# Patient Record
Sex: MALE | Race: WHITE | NOT HISPANIC OR LATINO | Employment: FULL TIME | URBAN - METROPOLITAN AREA
[De-identification: names, ages, dates, MRNs, and addresses within clinical notes are randomized per-mention and may not be internally consistent; named-entity substitution may affect disease eponyms.]

---

## 2017-05-28 ENCOUNTER — TRANSFERRED RECORDS (OUTPATIENT)
Dept: HEALTH INFORMATION MANAGEMENT | Facility: CLINIC | Age: 42
End: 2017-05-28

## 2017-06-02 ENCOUNTER — ALLIED HEALTH/NURSE VISIT (OUTPATIENT)
Dept: UROLOGY | Facility: CLINIC | Age: 42
End: 2017-06-02

## 2017-06-02 ENCOUNTER — OFFICE VISIT (OUTPATIENT)
Dept: UROLOGY | Facility: CLINIC | Age: 42
End: 2017-06-02

## 2017-06-02 VITALS
HEART RATE: 95 BPM | SYSTOLIC BLOOD PRESSURE: 141 MMHG | BODY MASS INDEX: 35.36 KG/M2 | HEIGHT: 66 IN | WEIGHT: 220 LBS | DIASTOLIC BLOOD PRESSURE: 99 MMHG

## 2017-06-02 DIAGNOSIS — N20.0 CALCULUS OF KIDNEY: Primary | ICD-10-CM

## 2017-06-02 LAB
ALBUMIN UR-MCNC: NEGATIVE MG/DL
ANION GAP SERPL CALCULATED.3IONS-SCNC: 10 MMOL/L (ref 3–14)
APPEARANCE UR: CLEAR
BILIRUB UR QL STRIP: NEGATIVE
BUN SERPL-MCNC: 14 MG/DL (ref 7–30)
CALCIUM SERPL-MCNC: 8.9 MG/DL (ref 8.5–10.1)
CHLORIDE SERPL-SCNC: 105 MMOL/L (ref 94–109)
CO2 SERPL-SCNC: 24 MMOL/L (ref 20–32)
COLOR UR AUTO: ABNORMAL
CREAT SERPL-MCNC: 0.77 MG/DL (ref 0.66–1.25)
GFR SERPL CREATININE-BSD FRML MDRD: ABNORMAL ML/MIN/1.7M2
GLUCOSE SERPL-MCNC: 309 MG/DL (ref 70–99)
GLUCOSE UR STRIP-MCNC: >499 MG/DL
HGB UR QL STRIP: ABNORMAL
KETONES UR STRIP-MCNC: NEGATIVE MG/DL
LEUKOCYTE ESTERASE UR QL STRIP: NEGATIVE
MUCOUS THREADS #/AREA URNS LPF: PRESENT /LPF
NITRATE UR QL: NEGATIVE
PH UR STRIP: 5 PH (ref 5–7)
POTASSIUM SERPL-SCNC: 4 MMOL/L (ref 3.4–5.3)
RBC #/AREA URNS AUTO: 0 /HPF (ref 0–2)
SODIUM SERPL-SCNC: 138 MMOL/L (ref 133–144)
SP GR UR STRIP: 1.01 (ref 1–1.03)
URN SPEC COLLECT METH UR: ABNORMAL
UROBILINOGEN UR STRIP-MCNC: 0 MG/DL (ref 0–2)
WBC #/AREA URNS AUTO: 1 /HPF (ref 0–2)

## 2017-06-02 RX ORDER — TAMSULOSIN HYDROCHLORIDE 0.4 MG/1
CAPSULE ORAL
COMMUNITY
Start: 2017-05-28 | End: 2017-06-02

## 2017-06-02 RX ORDER — CEFAZOLIN SODIUM 1 G/3ML
1 INJECTION, POWDER, FOR SOLUTION INTRAMUSCULAR; INTRAVENOUS SEE ADMIN INSTRUCTIONS
Status: CANCELLED | OUTPATIENT
Start: 2017-06-02

## 2017-06-02 RX ORDER — TAMSULOSIN HYDROCHLORIDE 0.4 MG/1
0.4 CAPSULE ORAL DAILY
Qty: 30 CAPSULE | Refills: 0 | Status: SHIPPED | OUTPATIENT
Start: 2017-06-02 | End: 2017-06-22

## 2017-06-02 RX ORDER — TRAMADOL HYDROCHLORIDE 50 MG/1
TABLET ORAL
COMMUNITY
Start: 2017-05-28 | End: 2017-06-20

## 2017-06-02 ASSESSMENT — ENCOUNTER SYMPTOMS
WEIGHT GAIN: 0
ALTERED TEMPERATURE REGULATION: 0
WEIGHT LOSS: 1
POLYPHAGIA: 0
FEVER: 0
POLYDIPSIA: 0
NIGHT SWEATS: 0
DECREASED APPETITE: 0
HALLUCINATIONS: 0
INCREASED ENERGY: 0
FATIGUE: 1
CHILLS: 0

## 2017-06-02 ASSESSMENT — PAIN SCALES - GENERAL: PAINLEVEL: MILD PAIN (2)

## 2017-06-02 NOTE — LETTER
6/2/2017       RE: Jose Luis Rae  [id number] [facility] [address]     Dear Colleague,    Thank you for referring your patient, Jose Luis Rae, to the [facility] UROLOGY AND INST FOR PROSTATE AND UROLOGIC CANCERS at [facility]. Please see a copy of my visit note below.    ASSESSMENT AND PLAN  He was seen in the ER over the weekend for evaluation of a 6 mm UPJ stone with associated mild to moderate hydronephrosis. At that time he was also noted to have a blood sugar in the 400s. We covered surgical risks which include but are not limited to heart attack, stroke, blood clot in the legs or lungs, death, injury to surrounding organs (intestine, liver, spleen, pancreas, lung, muscles, nerves), hernias, loss of sensation around incisions, decreased renal function, and infection.  Additional procedures may be necessary in the perioperative period.  He choose EXTRACORPOREAL SHOCKWAVE LITHOTRIPSY (ESWL) after we discussed ureteroscopy with laser lithotripsy and observation.  - Schedule shock wave lithotripsy  - KUB today  - Followup with primary care provider before surgery regarding elevated blood sugar    _______________________________________________________________________    CHIEF COMPLAINT  It was my pleasure to see Jose Luis Rae who is a 42 year old male for evaluation of renal stone    HPI   Jose Luis Rae is a 41 yo man who was seen in the ER over the weekend for evaluation of severe left flank pain and workup revealed a 6 mm UPJ stone with associated mild to moderate hydronephrosis. His creatinine at the time was 1.25 and UA was negative. Of note, he was also found to have a blood sugar in the 400s. He has never previously been diagnosed with diabetes. He's still having left sided flank pain and has not passed the stone. He's had no dysuria or hematuria. This is his first kidney stone. No family hx of kidney stones.     Radiologic Imaging      Patient Active  Problem List    Diagnosis Date Noted     Type 2 diabetes mellitus with hyperglycemia, without long-term current use of insulin (H) 06/14/2017     Priority: Medium     Abnormal liver function tests 10/12/2012     Priority: Medium     Hypertriglyceridemia 10/12/2012     Priority: Medium     Overweight 10/12/2012     Priority: Medium     Past Medical History:   Diagnosis Date     Abnormal liver function tests 10/12/2012     Calculus of kidney      Diabetes (H)      Hypertriglyceridemia 10/12/2012     Overweight 10/12/2012     Past Surgical History:   Procedure Laterality Date     ADENOIDECTOMY       Current Outpatient Prescriptions   Medication Sig Dispense Refill     traMADol (ULTRAM) 50 MG tablet        ketorolac (TORADOL) 10 MG tablet Take 10 mg by mouth every 6 hours as needed for moderate pain       Ibuprofen (ADVIL PO) Take 600 mg by mouth       blood glucose monitoring (NO BRAND SPECIFIED) test strip Needs One Touch Verio test strip. Use to test blood sugars 1-2 times daily or as directed 50 strip 3     metFORMIN (GLUCOPHAGE-XR) 500 MG 24 hr tablet Take 1 tablet (500 mg) by mouth 2 times daily (with meals) 60 tablet 3     tamsulosin (FLOMAX) 0.4 MG capsule Take 1 capsule (0.4 mg) by mouth daily 30 capsule 0      No Known Allergies  Family History   Problem Relation Age of Onset     DIABETES Maternal Grandfather      Coronary Artery Disease Maternal Grandfather      Hypertension Maternal Grandfather      Hyperlipidemia Maternal Grandfather      Lung Cancer Maternal Grandfather      Thyroid Disease Mother      CEREBROVASCULAR DISEASE No family hx of      Breast Cancer No family hx of      Colon Cancer No family hx of      Prostate Cancer No family hx of      Other Cancer No family hx of      Depression No family hx of      Anxiety Disorder No family hx of      MENTAL ILLNESS No family hx of      Substance Abuse No family hx of      Anesthesia Reaction No family hx of      Asthma No family hx of      OSTEOPOROSIS  "No family hx of      Genetic Disorder No family hx of      Obesity No family hx of      Unknown/Adopted No family hx of       Social History     Occupational History     Not on file.     Social History Main Topics     Smoking status: Never Smoker     Smokeless tobacco: Never Used     Alcohol use Yes      Comment: maybe once a week      Drug use: No     Sexual activity: Not Currently       REVIEW OF SYSTEMS  The following systems were evaluated:   Constitutional, Eyes, Ears/Nose/Throat, Respiratory, Cardiovascular, Gastrointestinal, Genitourinary, Musculoskeletal, Skin/Integument, Neurologic, Psychiatric, Hematologic/Lymphatic, Allergic/Immunologic, Endocrine.  The only pertinent positives were as follows:  None    PHYSICAL EXAM  Vitals:    06/02/17 1414   BP: (!) 141/99   Pulse: 95   Weight: 99.8 kg (220 lb)   Height: 1.676 m (5' 6\")     Wt Readings from Last 3 Encounters:   06/20/17 99.3 kg (219 lb)   06/13/17 99.7 kg (219 lb 14.4 oz)   06/02/17 99.8 kg (220 lb)     Constitutional: Alert, no acute distress  Psychiatric: Normal mood and affect  Head: Normocephalic. No masses, lesions, tenderness or abnormalities  Neck: Neck supple. No adenopathy. Thyroid symmetric, normal size  ENT: Oropharynx clear.  Back: No spinal tenderness.  Left costovertebral angle tenderness  Cardiovascular: RRR. No murmurs, clicks gallops or rub  Respiratory: Good diaphragmatic excursion. Lungs clear  Gastrointestinal: Abdomen soft, non-tender. No masses, organomegaly. No hernia  Skin: no suspicious lesions or rashes on abdomen  Extremities: No lower extremity edema.  No clubbing or cyanosis.  Neurologic: Cranial nerves grossly intact.  Equal strength and sensation on bilateral extremities.   : Deferred     No results for input(s): WBC, HGB, HCT, PLT in the last 91690 hours.  No results for input(s): NA, POTASSIUM, CHLORIDE, CO2, ANIONGAP, GLC, BUN, CR, GFRESTIMATED, GFRESTBLACK, APOORVA in the last 23470 hours.  No results for input(s): " COLOR, APPEARANCE, URINEGLC, URINEBILI, URINEKETONE, SG, UBLD, URINEPH, PROTEIN, UROBILINOGEN, NITRITE, LEUKEST, RBCU, WBCU in the last 10352 hours.    I, Ambrose Vasquez, reviewed these laboratory values.    I, Kemi Durant am acting as scribe for Dr. Ludwig Vasquez MD.    Mr. Rae, Your stone is visible on the xray.  I will plan to proceed with shockwave treatment of the stone.  Thank you, Ambrose Vasquez. Mr. Rae, Your urine shows no sign of infection.  Thank you, Ambrose Vasquez.    Again, thank you for allowing me to participate in the care of your patient.      Sincerely,    Ludwig Vasquez MD

## 2017-06-02 NOTE — LETTER
June 5, 2017       TO: Jose Luis Rae  [address] [place] [facility] [address]         Mr. Rae, Your stone is visible on the xray.  I will plan to proceed with shockwave treatment of the stone.  Thank you, Ambrose Vasquez         Resulted Orders   Basic metabolic panel   Result Value Ref Range    Sodium 138 133 - 144 mmol/L    Potassium 4.0 3.4 - 5.3 mmol/L    Chloride 105 94 - 109 mmol/L    Carbon Dioxide 24 20 - 32 mmol/L    Anion Gap 10 3 - 14 mmol/L    Glucose 309 (H) 70 - 99 mg/dL    Urea Nitrogen 14 7 - 30 mg/dL    Creatinine 0.77 0.66 - 1.25 mg/dL    GFR Estimate >90  Non  GFR Calc   >60 mL/min/1.7m2    GFR Estimate If Black >90   GFR Calc   >60 mL/min/1.7m2    Calcium 8.9 8.5 - 10.1 mg/dL   UA with Microscopic   Result Value Ref Range    Color Urine Straw     Appearance Urine Clear     Glucose Urine >499 (A) NEG mg/dL    Bilirubin Urine Negative NEG    Ketones Urine Negative NEG mg/dL    Specific Gravity Urine 1.015 1.003 - 1.035    Blood Urine Small (A) NEG    pH Urine 5.0 5.0 - 7.0 pH    Protein Albumin Urine Negative NEG mg/dL    Urobilinogen mg/dL 0.0 0.0 - 2.0 mg/dL    Nitrite Urine Negative NEG    Leukocyte Esterase Urine Negative NEG    Source Midstream Urine     WBC Urine 1 0 - 2 /HPF    RBC Urine 0 0 - 2 /HPF    Mucous Urine Present (A) NEG /LPF   Urine Culture Aerobic Bacterial   Result Value Ref Range    Specimen Description Midstream Urine     Special Requests Specimen received in preservative     Culture Micro No growth     Micro Report Status FINAL 06/03/2017    XR KUB    Narrative    Exam: XR KUB, 6/2/2017 4:13 PM    Indication: Calculus of kidney    Comparison: 5/28/2017.    Findings:   Two supine AP radiographs of the abdomen were obtained. Unchanged  position of calculus projecting just superior to the L4 left  transverse process, likely still within the left ureteropelvic  junction. Incomplete posterior fusion at S1. Nonobstructive bowel  gas  pattern.      Impression    Impression:   Stable position of 6 mm calculus at the left ureteropelvic junction.    I have personally reviewed the examination and initial interpretation  and I agree with the findings.    KIERA MCCONNELL MD

## 2017-06-02 NOTE — NURSING NOTE
Pre Op Teaching Flowsheet       Pre and Post op Patient Education  Relevant Diagnosis:  Kidney stone  Teaching Topic:  Pre and post op teaching  Person Involved in teaching: Jose Luis aRe    Motivation Level:  Asks Questions: Yes  Eager to Learn:  Yes  Cooperative: Yes  Receptive (willing/able to accept information):  Yes  Patient demonstrates understanding of the following:  Date and time of surgery:  June  Location of surgery:  [facility] [place]- 5th Floor  History and Physical and any other testing necessary prior to surgery: Yes  Required time line for completion of History and Physical and any pre-op testing: Yes    NPO Guidelines: Nothing to eat 8hrs prior, Nothing to drink 2hrs prior    Patient demonstrates understanding of the following:  Pre-op bowel prep:  N/A  Pre-op showering/scrub information with PCMX Soap: Yes  Medications to take the day of surgery:  Per PCP  Blood thinner medications discussed and when to stop (if applicable):  Yes  Diabetes medication management (if applicable):  N/A  Discussed pain control after surgery: pain medications  Infection Prevention: Patient demonstrates understanding of the following:  Surgical procedure site care taught: N/A  Signs and symptoms of infection taught:  Yes  Wound care will be taught at the time of discharge.  Central venous catheter care will be taught at the time of discharge (if applicable).    Post-op follow-up:  Discussed how to contact the hospital, nurse, and clinic scheduling staff if necessary.    Instructional materials used/given/mailed:  [facility] Booklet, post op teaching sheet, Map, Soap, and arrival/location information.    Surgical instructions packet given to patient in office:  Yes.

## 2017-06-02 NOTE — LETTER
June 5, 2017       TO: Jose Luis ALLAN Rae  [address] [facility] [address]       DearMr.Butch,    We are writing to inform you of your test results.    Your test results fall within the expected range(s) or remain unchanged from previous results.  Please continue with current treatment plan.    Resulted Orders   Basic metabolic panel   Result Value Ref Range    Sodium 138 133 - 144 mmol/L    Potassium 4.0 3.4 - 5.3 mmol/L    Chloride 105 94 - 109 mmol/L    Carbon Dioxide 24 20 - 32 mmol/L    Anion Gap 10 3 - 14 mmol/L    Glucose 309 (H) 70 - 99 mg/dL    Urea Nitrogen 14 7 - 30 mg/dL    Creatinine 0.77 0.66 - 1.25 mg/dL    GFR Estimate >90  Non  GFR Calc   >60 mL/min/1.7m2    GFR Estimate If Black >90   GFR Calc   >60 mL/min/1.7m2    Calcium 8.9 8.5 - 10.1 mg/dL   UA with Microscopic   Result Value Ref Range    Color Urine Straw     Appearance Urine Clear     Glucose Urine >499 (A) NEG mg/dL    Bilirubin Urine Negative NEG    Ketones Urine Negative NEG mg/dL    Specific Gravity Urine 1.015 1.003 - 1.035    Blood Urine Small (A) NEG    pH Urine 5.0 5.0 - 7.0 pH    Protein Albumin Urine Negative NEG mg/dL    Urobilinogen mg/dL 0.0 0.0 - 2.0 mg/dL    Nitrite Urine Negative NEG    Leukocyte Esterase Urine Negative NEG    Source Midstream Urine     WBC Urine 1 0 - 2 /HPF    RBC Urine 0 0 - 2 /HPF    Mucous Urine Present (A) NEG /LPF   Urine Culture Aerobic Bacterial   Result Value Ref Range    Specimen Description Midstream Urine     Special Requests Specimen received in preservative     Culture Micro No growth     Micro Report Status FINAL 06/03/2017    XR KUB    Narrative    Exam: XR KUB, 6/2/2017 4:13 PM    Indication: Calculus of kidney    Comparison: 5/28/2017.    Findings:   Two supine AP radiographs of the abdomen were obtained. Unchanged  position of calculus projecting just superior to the L4 left  transverse process, likely still within the left  ureteropelvic  junction. Incomplete posterior fusion at S1. Nonobstructive bowel gas  pattern.      Impression    Impression:   Stable position of 6 mm calculus at the left ureteropelvic junction.    I have personally reviewed the examination and initial interpretation  and I agree with the findings.    KIERA MCCONNELL MD         Mr. Rae, Your stone is visible on the xray.  I will plan to proceed with shockwave treatment of the stone.  Thank you, Ambrose Vasquez.

## 2017-06-02 NOTE — LETTER
June 5, 2017       TO: Jose Luis LEMUS Rae  [address] [facility] [address]       DearMr.Butch,    We are writing to inform you of your test results.    Your test results fall within the expected range(s) or remain unchanged from previous results.  Please continue with current treatment plan.    Resulted Orders   Basic metabolic panel   Result Value Ref Range    Sodium 138 133 - 144 mmol/L    Potassium 4.0 3.4 - 5.3 mmol/L    Chloride 105 94 - 109 mmol/L    Carbon Dioxide 24 20 - 32 mmol/L    Anion Gap 10 3 - 14 mmol/L    Glucose 309 (H) 70 - 99 mg/dL    Urea Nitrogen 14 7 - 30 mg/dL    Creatinine 0.77 0.66 - 1.25 mg/dL    GFR Estimate >90  Non  GFR Calc   >60 mL/min/1.7m2    GFR Estimate If Black >90   GFR Calc   >60 mL/min/1.7m2    Calcium 8.9 8.5 - 10.1 mg/dL   UA with Microscopic   Result Value Ref Range    Color Urine Straw     Appearance Urine Clear     Glucose Urine >499 (A) NEG mg/dL    Bilirubin Urine Negative NEG    Ketones Urine Negative NEG mg/dL    Specific Gravity Urine 1.015 1.003 - 1.035    Blood Urine Small (A) NEG    pH Urine 5.0 5.0 - 7.0 pH    Protein Albumin Urine Negative NEG mg/dL    Urobilinogen mg/dL 0.0 0.0 - 2.0 mg/dL    Nitrite Urine Negative NEG    Leukocyte Esterase Urine Negative NEG    Source Midstream Urine     WBC Urine 1 0 - 2 /HPF    RBC Urine 0 0 - 2 /HPF    Mucous Urine Present (A) NEG /LPF   Urine Culture Aerobic Bacterial   Result Value Ref Range    Specimen Description Midstream Urine     Special Requests Specimen received in preservative     Culture Micro No growth     Micro Report Status FINAL 06/03/2017        Thank you for choosing [facility] for your care. Please follow up as previously planned.  Please call with any questions or concerns.    Thank you,  Shantelle Casanova, RN Care Coordinator for  Dr. Ludwig Vasquez

## 2017-06-02 NOTE — PROGRESS NOTES
ASSESSMENT AND PLAN    He was seen in the ER over the weekend for evaluation of a 6 mm UPJ stone with associated mild to moderate hydronephrosis. At that time he was also noted to have a blood sugar in the 400s. We covered surgical risks which include but are not limited to heart attack, stroke, blood clot in the legs or lungs, death, injury to surrounding organs (intestine, liver, spleen, pancreas, lung, muscles, nerves), hernias, loss of sensation around incisions, decreased renal function, and infection.  Additional procedures may be necessary in the perioperative period.  He choose EXTRACORPOREAL SHOCKWAVE LITHOTRIPSY (ESWL) after we discussed ureteroscopy with laser lithotripsy and observation.  - Schedule shock wave lithotripsy  - KUB today  - Followup with primary care provider before surgery regarding elevated blood sugar    _______________________________________________________________________    CHIEF COMPLAINT  It was my pleasure to see Jose Luis Rae who is a 42 year old male for evaluation of renal stone    HPI   Jose Luis Rae is a 41 yo man who was seen in the ER over the weekend for evaluation of severe left flank pain and workup revealed a 6 mm UPJ stone with associated mild to moderate hydronephrosis. His creatinine at the time was 1.25 and UA was negative. Of note, he was also found to have a blood sugar in the 400s. He has never previously been diagnosed with diabetes. He's still having left sided flank pain and has not passed the stone. He's had no dysuria or hematuria. This is his first kidney stone. No family hx of kidney stones.     Radiologic Imaging      Patient Active Problem List    Diagnosis Date Noted     Type 2 diabetes mellitus with hyperglycemia, without long-term current use of insulin (H) 06/14/2017     Priority: Medium     Abnormal liver function tests 10/12/2012     Priority: Medium     Hypertriglyceridemia 10/12/2012     Priority: Medium     Overweight 10/12/2012      Priority: Medium     Past Medical History:   Diagnosis Date     Abnormal liver function tests 10/12/2012     Calculus of kidney      Diabetes (H)      Hypertriglyceridemia 10/12/2012     Overweight 10/12/2012     Past Surgical History:   Procedure Laterality Date     ADENOIDECTOMY       Current Outpatient Prescriptions   Medication Sig Dispense Refill     traMADol (ULTRAM) 50 MG tablet        ketorolac (TORADOL) 10 MG tablet Take 10 mg by mouth every 6 hours as needed for moderate pain       Ibuprofen (ADVIL PO) Take 600 mg by mouth       blood glucose monitoring (NO BRAND SPECIFIED) test strip Needs One Touch Verio test strip. Use to test blood sugars 1-2 times daily or as directed 50 strip 3     metFORMIN (GLUCOPHAGE-XR) 500 MG 24 hr tablet Take 1 tablet (500 mg) by mouth 2 times daily (with meals) 60 tablet 3     tamsulosin (FLOMAX) 0.4 MG capsule Take 1 capsule (0.4 mg) by mouth daily 30 capsule 0      No Known Allergies  Family History   Problem Relation Age of Onset     DIABETES Maternal Grandfather      Coronary Artery Disease Maternal Grandfather      Hypertension Maternal Grandfather      Hyperlipidemia Maternal Grandfather      Lung Cancer Maternal Grandfather      Thyroid Disease Mother      CEREBROVASCULAR DISEASE No family hx of      Breast Cancer No family hx of      Colon Cancer No family hx of      Prostate Cancer No family hx of      Other Cancer No family hx of      Depression No family hx of      Anxiety Disorder No family hx of      MENTAL ILLNESS No family hx of      Substance Abuse No family hx of      Anesthesia Reaction No family hx of      Asthma No family hx of      OSTEOPOROSIS No family hx of      Genetic Disorder No family hx of      Obesity No family hx of      Unknown/Adopted No family hx of       Social History     Occupational History     Not on file.     Social History Main Topics     Smoking status: Never Smoker     Smokeless tobacco: Never Used     Alcohol use Yes      Comment:  "maybe once a week      Drug use: No     Sexual activity: Not Currently       REVIEW OF SYSTEMS  The following systems were evaluated:   Constitutional, Eyes, Ears/Nose/Throat, Respiratory, Cardiovascular, Gastrointestinal, Genitourinary, Musculoskeletal, Skin/Integument, Neurologic, Psychiatric, Hematologic/Lymphatic, Allergic/Immunologic, Endocrine.  The only pertinent positives were as follows:  None    PHYSICAL EXAM  Vitals:    06/02/17 1414   BP: (!) 141/99   Pulse: 95   Weight: 99.8 kg (220 lb)   Height: 1.676 m (5' 6\")     Wt Readings from Last 3 Encounters:   06/20/17 99.3 kg (219 lb)   06/13/17 99.7 kg (219 lb 14.4 oz)   06/02/17 99.8 kg (220 lb)     Constitutional: Alert, no acute distress  Psychiatric: Normal mood and affect  Head: Normocephalic. No masses, lesions, tenderness or abnormalities  Neck: Neck supple. No adenopathy. Thyroid symmetric, normal size  ENT: Oropharynx clear.  Back: No spinal tenderness.  Left costovertebral angle tenderness  Cardiovascular: RRR. No murmurs, clicks gallops or rub  Respiratory: Good diaphragmatic excursion. Lungs clear  Gastrointestinal: Abdomen soft, non-tender. No masses, organomegaly. No hernia  Skin: no suspicious lesions or rashes on abdomen  Extremities: No lower extremity edema.  No clubbing or cyanosis.  Neurologic: Cranial nerves grossly intact.  Equal strength and sensation on bilateral extremities.   : Deferred     No results for input(s): WBC, HGB, HCT, PLT in the last 00475 hours.  No results for input(s): NA, POTASSIUM, CHLORIDE, CO2, ANIONGAP, GLC, BUN, CR, GFRESTIMATED, GFRESTBLACK, APOORVA in the last 80741 hours.  No results for input(s): COLOR, APPEARANCE, URINEGLC, URINEBILI, URINEKETONE, SG, UBLD, URINEPH, PROTEIN, UROBILINOGEN, NITRITE, LEUKEST, RBCU, WBCU in the last 31676 hours.    Ambrose HUTCHISON, reviewed these laboratory values.    Kemi HUTCHISON am acting as scribe for Dr. Ludwig Vasquez MD.    Answers for HPI/ROS submitted by " the patient on 6/2/2017   General Symptoms: Yes  Skin Symptoms: No  HENT Symptoms: No  EYE SYMPTOMS: No  HEART SYMPTOMS: No  LUNG SYMPTOMS: No  INTESTINAL SYMPTOMS: No  URINARY SYMPTOMS: No  REPRODUCTIVE SYMPTOMS: No  SKELETAL SYMPTOMS: No  BLOOD SYMPTOMS: No  NERVOUS SYSTEM SYMPTOMS: No  MENTAL HEALTH SYMPTOMS: No  Fever: No  Loss of appetite: No  Weight loss: Yes  Weight gain: No  Fatigue: Yes  Night sweats: No  Chills: No  Increased stress: No  Excessive hunger: No  Excessive thirst: No  Feeling hot or cold when others believe the temperature is normal: No  Loss of height: No  Post-operative complications: No  Surgical site pain: No  Hallucinations: No  Change in or Loss of Energy: No  Hyperactivity: No  Confusion: No    Kemi Durant served as the scribe for this patient's visit and documented my history and physical exam.  I performed the history and physical exam.  I have edited and agree with the note.    BLOSSOM Vasquez MD

## 2017-06-02 NOTE — PATIENT INSTRUCTIONS
Schedule surgery with Dr. Vasquez.    It was a pleasure meeting with you today.  Thank you for allowing me and my team the privilege of caring for you today.  YOU are the reason we are here, and I truly hope we provided you with the excellent service you deserve.  Please let us know if there is anything else we can do for you so that we can be sure you are leaving completely satisfied with your care experience.      KHALIDA Perez

## 2017-06-02 NOTE — MR AVS SNAPSHOT
After Visit Summary   6/2/2017    Jose Luis Rae    MRN: [id number]           Patient Information     Date Of Birth          1975        Visit Information        Provider Department      6/2/2017 2:40 PM Ludwig Vasquez MD [facility] Urology and [facility] for Prostate and Urologic Cancers        Today's Diagnoses     Calculus of kidney    -  1      Care Instructions    Schedule surgery with Dr. Vasquez.    It was a pleasure meeting with you today.  Thank you for allowing me and my team the privilege of caring for you today.  YOU are the reason we are here, and I truly hope we provided you with the excellent service you deserve.  Please let us know if there is anything else we can do for you so that we can be sure you are leaving completely satisfied with your care experience.      KHALIDA Perez          Follow-ups after your visit        Your next 10 appointments already scheduled     Jul 21, 2017  2:00 PM CDT   XR KUB with UCXR1   [facility] Xray ([facility] [place] [facility])    [address]  [address] [facility] [address] [phone]           Please bring a list of your current medicines to your exam. (Include vitamins, minerals and over-thecounter medicines.) Leave your valuables at home.  Tell your doctor if there is a chance you may be pregnant.  You do not need to do anything special for this exam.            Jul 21, 2017  2:45 PM CDT   (Arrive by 2:30 PM)   Post-Op with Guadalupe Thorpe MD   [facility] Urology and [facility] for Prostate and Urologic Cancers ([facility] [place])    [address]  4th Floor  [facility] [id number] [phone]            Sep 11, 2017  8:00 AM CDT   Bolivar Kramer with LOUIE Salcedo CNP   [facility] ([facility])    [address] [address] [place] [address] [phone]            Dec 11, 2017  8:00 AM CST   MyChart Long with  "LOUIE Salcedo CNP   [facility] ([facility])    [address] [address] [place] [address] [phone]              Who to contact     Please call your clinic at [phone] to:    Ask questions about your health    Make or cancel appointments    Discuss your medicines    Learn about your test results    Speak to your doctor   If you have compliments or concerns about an experience at your clinic, or if you wish to file a complaint, please contact [facility] Patient Relations at [phone] or email us at [email or web address]         Additional Information About Your Visit        [employer] Information     [employer] gives you secure access to your electronic health record. If you see a primary care provider, you can also send messages to your care team and make appointments. If you have questions, please call your primary care clinic.  If you do not have a primary care provider, please call [phone] and they will assist you.      [employer] is an electronic gateway that provides easy, online access to your medical records. With [employer], you can request a clinic appointment, read your test results, renew a prescription or communicate with your care team.     To access your existing account, please contact your [facility] or call [phone] for assistance.        Care EveryWhere ID     This is your Care EveryWhere ID. This could be used by other organizations to access your [place] medical records  [id number]        Your Vitals Were     Pulse Height BMI (Body Mass Index)             95 1.676 m (5' 6\") 35.51 kg/m2          Blood Pressure from Last 3 Encounters:   06/20/17 (!) 145/96   06/13/17 130/75   06/02/17 (!) 141/99    Weight from Last 3 Encounters:   06/20/17 99.3 kg (219 lb)   06/13/17 99.7 kg (219 lb 14.4 oz)   06/02/17 99.8 kg (220 lb)              We Performed the Following     Basic " metabolic panel     Thalia-Operative Worksheet  (Urology General)     UA with Microscopic     Urine Culture Aerobic Bacterial     XR KUB          Today's Medication Changes          These changes are accurate as of: 6/2/17 11:59 PM.  If you have any questions, ask your nurse or doctor.               These medicines have changed or have updated prescriptions.        Dose/Directions    tamsulosin 0.4 MG capsule   Commonly known as:  FLOMAX   This may have changed:    - how much to take  - when to take this   Used for:  Calculus of kidney   Changed by:  Ludwig Vasquez MD        Dose:  0.4 mg   Take 1 capsule (0.4 mg) by mouth daily   Quantity:  30 capsule   Refills:  0            Where to get your medicines      These medications were sent to [facility] [address] [place], [place] - [address] [address] [facility]  [address] [facility] [address]     Phone:  [phone]     tamsulosin 0.4 MG capsule                Primary Care Provider    None Specified       No primary provider on file.        Thank you!     Thank you for choosing [facility] UROLOGY AND [facility] FOR PROSTATE AND UROLOGIC CANCERS  for your care. Our goal is always to provide you with excellent care. Hearing back from our patients is one way we can continue to improve our services. Please take a few minutes to complete the written survey that you may receive in the mail after your visit with us. Thank you!             Your Updated Medication List - Protect others around you: Learn how to safely use, store and throw away your medicines at www.disposemymeds.org.          This list is accurate as of: 6/2/17 11:59 PM.  Always use your most recent med list.                   Brand Name Dispense Instructions for use    tamsulosin 0.4 MG capsule    FLOMAX    30 capsule    Take 1 capsule (0.4 mg) by mouth daily       traMADol 50 MG tablet    ULTRAM

## 2017-06-02 NOTE — NURSING NOTE
"Chief Complaint   Patient presents with     Consult     New patient consult for kidney stones       Initial BP (!) 141/99  Pulse 95  Ht 1.676 m (5' 6\")  Wt 99.8 kg (220 lb)  BMI 35.51 kg/m2 Estimated body mass index is 35.51 kg/(m^2) as calculated from the following:    Height as of this encounter: 1.676 m (5' 6\").    Weight as of this encounter: 99.8 kg (220 lb).  Medication Reconciliation: complete     KHALIDA Perez    "

## 2017-06-02 NOTE — MR AVS SNAPSHOT
After Visit Summary   2017    Jose Luis Rae    MRN: [id number]           Patient Information     Date Of Birth          1975        Visit Information        Provider Department      2017 3:00 PM Nurse,  Prostate Cancer [facility] and [facility] for Prostate and Urologic Cancers        Today's Diagnoses     Calculus of kidney    -  1       Follow-ups after your visit        Who to contact     Please call your clinic at [phone] to:    Ask questions about your health    Make or cancel appointments    Discuss your medicines    Learn about your test results    Speak to your doctor   If you have compliments or concerns about an experience at your clinic, or if you wish to file a complaint, please contact [facility] Patient Relations at [phone] or email us at [email or web address]         Additional Information About Your Visit        MyChart Information     [employer] is an electronic gateway that provides easy, online access to your medical records. With [employer], you can request a clinic appointment, read your test results, renew a prescription or communicate with your care team.     To sign up for [employer] visit the website at [email or web address]   You will be asked to enter the access code listed below, as well as some personal information. Please follow the directions to create your username and password.     Your access code is: [email or web address]  Expires: 2017  9:39 AM     Your access code will  in 90 days. If you need help or a new code, please contact your [facility] or call [phone] for assistance.        Care EveryWhere ID     This is your Care EveryWhere ID. This could be used by other organizations to access your [place] medical records  [id number]         Blood Pressure from Last 3 Encounters:   17 (!) 141/99    Weight from Last 3 Encounters:   17 99.8 kg (220 lb)               Today, you had the following     No orders found for display         Today's Medication Changes          These changes are accurate as of: 6/2/17  3:29 PM.  If you have any questions, ask your nurse or doctor.               These medicines have changed or have updated prescriptions.        Dose/Directions    tamsulosin 0.4 MG capsule   Commonly known as:  FLOMAX   This may have changed:    - how much to take  - when to take this   Used for:  Calculus of kidney   Changed by:  Ludwig Vasquez MD        Dose:  0.4 mg   Take 1 capsule (0.4 mg) by mouth daily   Quantity:  30 capsule   Refills:  0            Where to get your medicines      These medications were sent to [facility] [address] [place], [place] - [address] [facility]  [address] [facility] [address]     Phone:  [phone]     tamsulosin 0.4 MG capsule                Primary Care Provider    None Specified       No primary provider on file.        Thank you!     Thank you for choosing [facility] UROLOGY AND [facility] FOR PROSTATE AND UROLOGIC CANCERS  for your care. Our goal is always to provide you with excellent care. Hearing back from our patients is one way we can continue to improve our services. Please take a few minutes to complete the written survey that you may receive in the mail after your visit with us. Thank you!             Your Updated Medication List - Protect others around you: Learn how to safely use, store and throw away your medicines at www.disposemymeds.org.          This list is accurate as of: 6/2/17  3:29 PM.  Always use your most recent med list.                   Brand Name Dispense Instructions for use    tamsulosin 0.4 MG capsule    FLOMAX    30 capsule    Take 1 capsule (0.4 mg) by mouth daily       traMADol 50 MG tablet    ULTRAM

## 2017-06-03 LAB
BACTERIA SPEC CULT: NO GROWTH
Lab: NORMAL
MICRO REPORT STATUS: NORMAL
SPECIMEN SOURCE: NORMAL

## 2017-06-04 NOTE — PROGRESS NOTES
MrPaulina Rae, Your stone is visible on the xray.  I will plan to proceed with shockwave treatment of the stone.  Thank you, Ambrose Vasquez.

## 2017-06-05 ENCOUNTER — TELEPHONE (OUTPATIENT)
Dept: UROLOGY | Facility: CLINIC | Age: 42
End: 2017-06-05

## 2017-06-05 DIAGNOSIS — N20.0 KIDNEY STONE: Primary | ICD-10-CM

## 2017-06-05 NOTE — TELEPHONE ENCOUNTER
Spoke to patient surgery scheduled for 06/20/17 at 2:00pm with a 12:00pm check in. Patient post-op is scheduled for 07/21/17 at 2:45pm with a 2:00pm KUB. Surgery packet being mailed out today. Patient had questions about pain management I let him know that I would send a message to 's nurse to give me a call regarding that. Patient had no further questions.

## 2017-06-06 DIAGNOSIS — N20.0 KIDNEY STONE: Primary | ICD-10-CM

## 2017-06-06 RX ORDER — TRAMADOL HYDROCHLORIDE 50 MG/1
50 TABLET ORAL EVERY 6 HOURS PRN
Status: DISCONTINUED | OUTPATIENT
Start: 2017-06-06 | End: 2017-06-06 | Stop reason: HOSPADM

## 2017-06-13 ENCOUNTER — RADIANT APPOINTMENT (OUTPATIENT)
Dept: GENERAL RADIOLOGY | Facility: CLINIC | Age: 42
End: 2017-06-13
Attending: NURSE PRACTITIONER
Payer: COMMERCIAL

## 2017-06-13 ENCOUNTER — OFFICE VISIT (OUTPATIENT)
Dept: PEDIATRICS | Facility: CLINIC | Age: 42
End: 2017-06-13
Payer: COMMERCIAL

## 2017-06-13 VITALS
OXYGEN SATURATION: 96 % | DIASTOLIC BLOOD PRESSURE: 75 MMHG | BODY MASS INDEX: 35.49 KG/M2 | TEMPERATURE: 97.2 F | HEART RATE: 85 BPM | SYSTOLIC BLOOD PRESSURE: 130 MMHG | WEIGHT: 219.9 LBS

## 2017-06-13 DIAGNOSIS — Z13.29 SCREENING FOR THYROID DISORDER: ICD-10-CM

## 2017-06-13 DIAGNOSIS — Z13.1 SCREENING FOR DIABETES MELLITUS: ICD-10-CM

## 2017-06-13 DIAGNOSIS — R73.09 ELEVATED GLUCOSE: ICD-10-CM

## 2017-06-13 DIAGNOSIS — N20.0 KIDNEY STONE: ICD-10-CM

## 2017-06-13 DIAGNOSIS — E11.65 TYPE 2 DIABETES MELLITUS WITH HYPERGLYCEMIA, WITHOUT LONG-TERM CURRENT USE OF INSULIN (H): ICD-10-CM

## 2017-06-13 DIAGNOSIS — Z13.6 CARDIOVASCULAR SCREENING; LDL GOAL LESS THAN 160: ICD-10-CM

## 2017-06-13 DIAGNOSIS — R79.89 ELEVATED LFTS: ICD-10-CM

## 2017-06-13 DIAGNOSIS — Z01.818 PREOP GENERAL PHYSICAL EXAM: Primary | ICD-10-CM

## 2017-06-13 DIAGNOSIS — Z01.818 PREOP GENERAL PHYSICAL EXAM: ICD-10-CM

## 2017-06-13 LAB
ALBUMIN SERPL-MCNC: 4.2 G/DL (ref 3.4–5)
ALP SERPL-CCNC: 103 U/L (ref 40–150)
ALT SERPL W P-5'-P-CCNC: 210 U/L (ref 0–70)
ANION GAP SERPL CALCULATED.3IONS-SCNC: 9 MMOL/L (ref 3–14)
AST SERPL W P-5'-P-CCNC: 84 U/L (ref 0–45)
BILIRUB SERPL-MCNC: 1.1 MG/DL (ref 0.2–1.3)
BUN SERPL-MCNC: 12 MG/DL (ref 7–30)
CALCIUM SERPL-MCNC: 9.1 MG/DL (ref 8.5–10.1)
CHLORIDE SERPL-SCNC: 107 MMOL/L (ref 94–109)
CHOLEST SERPL-MCNC: 211 MG/DL
CO2 SERPL-SCNC: 26 MMOL/L (ref 20–32)
CREAT SERPL-MCNC: 0.78 MG/DL (ref 0.66–1.25)
ERYTHROCYTE [DISTWIDTH] IN BLOOD BY AUTOMATED COUNT: 12.8 % (ref 10–15)
GFR SERPL CREATININE-BSD FRML MDRD: ABNORMAL ML/MIN/1.7M2
GLUCOSE SERPL-MCNC: 169 MG/DL (ref 70–99)
HBA1C MFR BLD: 10.1 % (ref 4.3–6)
HCT VFR BLD AUTO: 46.1 % (ref 40–53)
HDLC SERPL-MCNC: 40 MG/DL
HGB BLD-MCNC: 16.2 G/DL (ref 13.3–17.7)
LDLC SERPL CALC-MCNC: 135 MG/DL
MCH RBC QN AUTO: 30.8 PG (ref 26.5–33)
MCHC RBC AUTO-ENTMCNC: 35.1 G/DL (ref 31.5–36.5)
MCV RBC AUTO: 88 FL (ref 78–100)
NONHDLC SERPL-MCNC: 171 MG/DL
PLATELET # BLD AUTO: 244 10E9/L (ref 150–450)
POTASSIUM SERPL-SCNC: 3.6 MMOL/L (ref 3.4–5.3)
PROT SERPL-MCNC: 8 G/DL (ref 6.8–8.8)
RBC # BLD AUTO: 5.26 10E12/L (ref 4.4–5.9)
SODIUM SERPL-SCNC: 142 MMOL/L (ref 133–144)
TRIGL SERPL-MCNC: 181 MG/DL
TSH SERPL DL<=0.005 MIU/L-ACNC: 2.05 MU/L (ref 0.4–4)
WBC # BLD AUTO: 7.3 10E9/L (ref 4–11)

## 2017-06-13 PROCEDURE — 80061 LIPID PANEL: CPT | Performed by: NURSE PRACTITIONER

## 2017-06-13 PROCEDURE — 86706 HEP B SURFACE ANTIBODY: CPT | Performed by: NURSE PRACTITIONER

## 2017-06-13 PROCEDURE — 86803 HEPATITIS C AB TEST: CPT | Performed by: NURSE PRACTITIONER

## 2017-06-13 PROCEDURE — 83036 HEMOGLOBIN GLYCOSYLATED A1C: CPT | Performed by: NURSE PRACTITIONER

## 2017-06-13 PROCEDURE — 99214 OFFICE O/P EST MOD 30 MIN: CPT | Performed by: NURSE PRACTITIONER

## 2017-06-13 PROCEDURE — 86704 HEP B CORE ANTIBODY TOTAL: CPT | Performed by: NURSE PRACTITIONER

## 2017-06-13 PROCEDURE — 74010 XR KUB: CPT | Mod: 52

## 2017-06-13 PROCEDURE — 87340 HEPATITIS B SURFACE AG IA: CPT | Performed by: NURSE PRACTITIONER

## 2017-06-13 PROCEDURE — 82043 UR ALBUMIN QUANTITATIVE: CPT | Performed by: NURSE PRACTITIONER

## 2017-06-13 PROCEDURE — 85027 COMPLETE CBC AUTOMATED: CPT | Performed by: NURSE PRACTITIONER

## 2017-06-13 PROCEDURE — 84443 ASSAY THYROID STIM HORMONE: CPT | Performed by: NURSE PRACTITIONER

## 2017-06-13 PROCEDURE — 36415 COLL VENOUS BLD VENIPUNCTURE: CPT | Performed by: NURSE PRACTITIONER

## 2017-06-13 PROCEDURE — 80053 COMPREHEN METABOLIC PANEL: CPT | Performed by: NURSE PRACTITIONER

## 2017-06-13 NOTE — PATIENT INSTRUCTIONS
PLAN:   1.   Symptomatic therapy suggested: increase fluids and call prn if symptoms persist or worsen.    2.  Orders Placed This Encounter   Procedures     XR KUB     Hemoglobin A1c     CBC with platelets     Comprehensive metabolic panel     Albumin Random Urine Quantitative     TSH with free T4 reflex     Lipid panel reflex to direct LDL     3. Patient needs to follow up in if no improvement,or sooner if worsening of symptoms or other symptoms develop.  Will follow up and/or notify patient of  results via My Chart to determine further need for followup        Before Your Surgery      Call your surgeon if there is any change in your health. This includes signs of a cold or flu (such as a sore throat, runny nose, cough, rash or fever).    Do not smoke, drink alcohol or take over the counter medicine (unless your surgeon or primary care doctor tells you to) for the 24 hours before and after surgery.    If you take prescribed drugs: Follow your doctor s orders about which medicines to take and which to stop until after surgery.    Eating and drinking prior to surgery: follow the instructions from your surgeon    Take a shower or bath the night before surgery. Use the soap your surgeon gave you to gently clean your skin. If you do not have soap from your surgeon, use your regular soap. Do not shave or scrub the surgery site.  Wear clean pajamas and have clean sheets on your bed.     It was a pleasure seeing you today at the [facility]. Thank you for allowing us to care for you today. We truly hope we provided you with the excellent service you deserve. Please let us know if there is anything else we can do for you so we can be sure you are leaving completley satisfied with your care experience.       General information about your clinic   Clinic Hours Lab Hours (Appointments are required)   Mon-Thurs: 7:30 AM - 7 PM Mon-Thurs: 7:30 AM - 7 PM   Fri: 7:30 AM - 5 PM Fri: 7:30 AM - 5 PM         After Hours Nurse Advise & Appts:  [facility] Nurse Advisors: [phone]  [facility] On Call: to make appointments anytime: [phone] On Call Physician: call [phone] and answering service will page the on call physician.        For urgent appointments, please call [phone] and ask for the triage nurse or your care team clinic nurse.  How to contact my care team:  MyChart: [email or web address]   Phone: [phone]   Fax: [phone]       [facility] Pharmacy:   Phone: [phone]  Hours: 8:00 AM - 6:00 PM  Medication Refills:  Call your pharmacy and they will forward the refill to us. Please allow 3 business days for your refills to be completed.       Normal or non-critical lab and imaging results will be communicated to you by MyChart, letter or phone within 7 days.  If you do not hear from us within 10 days, please call the clinic. If you have a critical or abnormal lab result, we will notify you by phone as soon as possible.       We now have PWIC (Pediatric Walk in Care)  Monday-Friday from 7:30-4. Simply walk in and be seen for your urgent needs like cough, fever, rash, diarrhea or vomiting, pink eye, UTI. No appointments needed. Ask one of the team for more information      -Your Care Team:    Dr. Jeff Doyle - Internal Medicine/Pediatrics   Dr. Jacqueline Song - Family Medicine  Dr. Elizabeth Hayes - Pediatrics  Melissa Liu CNP - Family Practice Nurse Practitioner  Dr. Sharmin Delgado - Pediatrics  Dr. Brandyn Sherman - Internal Medicine

## 2017-06-13 NOTE — Clinical Note
iMguel Doyle  Please review this for me Newly diagnosed diabetic with A1c 10.1 Preop for kidney stone Routed the chart to his urologist but have heard nothing  Thanks Yue

## 2017-06-13 NOTE — NURSING NOTE
"Chief Complaint   Patient presents with     Pre-Op Exam     DOS:6/20/17     Diabetes     blood sugar was elevated when he went into the ER       Initial /75 (BP Location: Right arm, Patient Position: Sitting, Cuff Size: Adult Regular)  Pulse 85  Temp 97.2  F (36.2  C) (Temporal)  Wt 219 lb 14.4 oz (99.7 kg)  SpO2 96%  BMI 35.49 kg/m2 Estimated body mass index is 35.49 kg/(m^2) as calculated from the following:    Height as of 6/2/17: 5' 6\" (1.676 m).    Weight as of this encounter: 219 lb 14.4 oz (99.7 kg).  Medication Reconciliation: complete      KHALIDA Kahn      "

## 2017-06-13 NOTE — PROGRESS NOTES
[address] [address]  [phone]  Dept: [phone]    PRE-OP EVALUATION:  Today's date: 2017    Jose Luis Rae (: 1975) presents for pre-operative evaluation assessment as requested by Dr. Vasquez.  He requires evaluation and anesthesia risk assessment prior to undergoing surgery/procedure for treatment of kidney stone .  Proposed procedure: Left Extracorporal Shockwave Lithotripsy  Had attack of kidney stone and was in the ER on 2017 and saw urology on 2017 and now is better.     Date of Surgery/ Procedure: 17  Time of Surgery/ Procedure: 2:00PM  Hospital/Surgical Facility: [facility]   Fax number for surgical facility:   Primary Physician: No primary care provider on file.  Type of Anesthesia Anticipated: General    Patient has a Health Care Directive or Living Will:  NO    1. NO - Do you have a history of heart attack, stroke, stent, bypass or surgery on an artery in the head, neck, heart or legs?  2. NO - Do you ever have any pain or discomfort in your chest?  3. NO - Do you have a history of  Heart Failure?  4. NO - Are you troubled by shortness of breath when: walking on the level, up a slight hill or at night?  5. NO - Do you currently have a cold, bronchitis or other respiratory infection?  6. NO - Do you have a cough, shortness of breath or wheezing?  7. NO - Do you sometimes get pains in the calves of your legs when you walk?  8. NO - Do you or anyone in your family have previous history of blood clots?  9. NO - Do you or does anyone in your family have a serious bleeding problem such as prolonged bleeding following surgeries or cuts?  10. NO - Have you ever had problems with anemia or been told to take iron pills?  11. NO - Have you had any abnormal blood loss such as black, tarry or bloody stools, or abnormal vaginal bleeding?  12. NO - Have you ever had a blood transfusion?  13. NO - Have you or any  of your relatives ever had problems with anesthesia?  14. NO - Do you have sleep apnea, excessive snoring or daytime drowsiness?  15. NO - Do you have any prosthetic heart valves?  16. NO - Do you have prosthetic joints?  17. NO - Is there any chance that you may be pregnant?      HPI:                                                      Brief HPI related to upcoming procedure: surgery/procedure for treatment of kidney stone .  Proposed procedure: Left Extracorporal Shockwave Lithotripsy      DIABETES - Patient has a new diagnosis of DiabetesType Type II . Patient is being treated with oral agents and denies significant side effects.  Complicating factors include but are not limited to: obesity .                                                                                                             .    MEDICAL HISTORY:                                                      Patient Active Problem List    Diagnosis Date Noted     Type 2 diabetes mellitus with hyperglycemia, without long-term current use of insulin (H) 06/14/2017     Priority: Medium     Abnormal liver function tests 10/12/2012     Priority: Medium     Hypertriglyceridemia 10/12/2012     Priority: Medium     Overweight 10/12/2012     Priority: Medium      Past Medical History:   Diagnosis Date     Abnormal liver function tests 10/12/2012     Hypertriglyceridemia 10/12/2012     Overweight 10/12/2012     History reviewed. No pertinent surgical history.  Current Outpatient Prescriptions   Medication Sig Dispense Refill     tamsulosin (FLOMAX) 0.4 MG capsule Take 1 capsule (0.4 mg) by mouth daily 30 capsule 0     traMADol (ULTRAM) 50 MG tablet        OTC products: no recent use of OTC ASA, NSAIDS or Steroids and no use of herbal medications or other supplements    No Known Allergies   Latex Allergy: NO    Social History   Substance Use Topics     Smoking status: Never Smoker     Smokeless tobacco: Never Used     Alcohol use No     History   Drug Use No        REVIEW OF SYSTEMS:                                                    CONSTITUTIONAL:POSITIVE  for weight loss and NEGATIVE  for chills and fever   E/M: NEGATIVE for ear, mouth and throat problems  R: NEGATIVE for significant cough or SOB  CV: NEGATIVE for chest pain, palpitations or peripheral edema  GI: NEGATIVE for nausea, abdominal pain, heartburn, or change in bowel habits  M: NEGATIVE for significant arthralgias or myalgia  N: NEGATIVE for weakness, dizziness or paresthesias  E: NEGATIVE for temperature intolerance, skin/hair changes  H: NEGATIVE for bleeding problems  P: NEGATIVE for changes in mood or affect    EXAM:                                                    /75 (BP Location: Right arm, Patient Position: Sitting, Cuff Size: Adult Regular)  Pulse 85  Temp 97.2  F (36.2  C) (Temporal)  Wt 219 lb 14.4 oz (99.7 kg)  SpO2 96%  BMI 35.49 kg/m2    GENERAL APPEARANCE: alert, active and obese     HENT: ear canals and TM's normal and nose and mouth without ulcers or lesions     NECK: no adenopathy, no asymmetry, masses, or scars and thyroid normal to palpation     RESP: lungs clear to auscultation - no rales, rhonchi or wheezes     CV: regular rates and rhythm, no murmur, click or rub and no irregular beats     ABDOMEN: soft, nontender, without hepatosplenomegaly or masses     MS: extremities normal- no gross deformities noted, no evidence of inflammation in joints, FROM in all extremities.     SKIN: no suspicious lesions or rashes     NEURO: Normal strength and tone, sensory exam grossly normal, mentation intact and speech normal     PSYCH: mentation appears normal. and affect normal/bright    DIAGNOSTICS:                                                    EKG: Not indicated due to non-vascular surgery and low risk of event (age <65 and without cardiac risk factors)  Results for orders placed or performed in visit on 06/13/17   Hemoglobin A1c   Result Value Ref Range    Hemoglobin A1C 10.1  (H) 4.3 - 6.0 %   CBC with platelets   Result Value Ref Range    WBC 7.3 4.0 - 11.0 10e9/L    RBC Count 5.26 4.4 - 5.9 10e12/L    Hemoglobin 16.2 13.3 - 17.7 g/dL    Hematocrit 46.1 40.0 - 53.0 %    MCV 88 78 - 100 fl    MCH 30.8 26.5 - 33.0 pg    MCHC 35.1 31.5 - 36.5 g/dL    RDW 12.8 10.0 - 15.0 %    Platelet Count 244 150 - 450 10e9/L   Comprehensive metabolic panel   Result Value Ref Range    Sodium 142 133 - 144 mmol/L    Potassium 3.6 3.4 - 5.3 mmol/L    Chloride 107 94 - 109 mmol/L    Carbon Dioxide 26 20 - 32 mmol/L    Anion Gap 9 3 - 14 mmol/L    Glucose 169 (H) 70 - 99 mg/dL    Urea Nitrogen 12 7 - 30 mg/dL    Creatinine 0.78 0.66 - 1.25 mg/dL    GFR Estimate >90  Non  GFR Calc   >60 mL/min/1.7m2    GFR Estimate If Black >90   GFR Calc   >60 mL/min/1.7m2    Calcium 9.1 8.5 - 10.1 mg/dL    Bilirubin Total 1.1 0.2 - 1.3 mg/dL    Albumin 4.2 3.4 - 5.0 g/dL    Protein Total 8.0 6.8 - 8.8 g/dL    Alkaline Phosphatase 103 40 - 150 U/L     (H) 0 - 70 U/L    AST 84 (H) 0 - 45 U/L   TSH with free T4 reflex   Result Value Ref Range    TSH 2.05 0.40 - 4.00 mU/L   Lipid panel reflex to direct LDL   Result Value Ref Range    Cholesterol 211 (H) <200 mg/dL    Triglycerides 181 (H) <150 mg/dL    HDL Cholesterol 40 >39 mg/dL    LDL Cholesterol Calculated 135 (H) <100 mg/dL    Non HDL Cholesterol 171 (H) <130 mg/dL     XR KUB    Narrative    Exam: XR KUB, 6/13/2017 4:53 PM    Indication: Encounter for other preprocedural examination, Calculus of  kidney    Comparison: 6/2/2017, 5/28/2017    Findings:   Two AP supine radiographs of the abdomen were obtained. The previously  seen 7 mm calculus projected over the left abdomen is positioned  slightly more inferiorly compared to the previous exam. The stone now  projects just superior of the left L5 transverse process. Stable renal  silhouettes. No dilated loops of bowel, pneumatosis, or portal venous  gas. Positive bowel gas. Incomplete  posterior fusion of the S1  vertebral body.      Impression    Impression:   1. The left ureteral calculus has passed approximately 3 cm inferiorly  compared to the previous exam.  2. No dilated loops of bowel, pneumatosis, or portal venous gas.    I have personally reviewed the examination and initial interpretation  and I agree with the findings.    KAIA IRWIN MD     IMPRESSION:                                                    Reason for surgery/procedure: surgery/procedure for treatment of kidney stone .  Proposed procedure: Left Extracorporal Shockwave Lithotripsy    Diagnosis/reason for consult: preop evaluation     The proposed surgical procedure is considered INTERMEDIATE risk.    REVISED CARDIAC RISK INDEX  The patient has the following serious cardiovascular risks for perioperative complications such as (MI, PE, VFib and 3  AV Block):  No serious cardiac risks  INTERPRETATION: 1 risks: Class II (low risk - 0.9% complication rate)    The patient has the following additional risks for perioperative complications:  The 10-year ASCVD risk score (Gloria ARMSTRONG Jr, et al., 2013) is: 3.4%    Values used to calculate the score:      Age: 42 years      Sex: Male      Is Non- : No      Diabetic: Yes      Tobacco smoker: No      Systolic Blood Pressure: 130 mmHg      Is BP treated: No      HDL Cholesterol: 40 mg/dL      Total Cholesterol: 211 mg/dL    Jose Luis was seen today for pre-op exam and diabetes.    Diagnoses and all orders for this visit:    Preop general physical exam  -     Hemoglobin A1c  -     CBC with platelets  -     Cancel: Basic metabolic panel  -     Comprehensive metabolic panel  -     XR KUB; Future    Kidney stone  -     Hemoglobin A1c  -     CBC with platelets  -     Cancel: Basic metabolic panel  -     Comprehensive metabolic panel  -     XR KUB; Future    Elevated glucose  -     Hemoglobin A1c  -     CBC with platelets  -     Cancel: Basic metabolic panel  -      Comprehensive metabolic panel  -     Albumin Random Urine Quantitative    CARDIOVASCULAR SCREENING; LDL GOAL LESS THAN 160  -     Lipid panel reflex to direct LDL    Screening for diabetes mellitus  -     Comprehensive metabolic panel    Screening for thyroid disorder  -     TSH with free T4 reflex    Elevated LFTs  -     Cancel: Hepatitis A Antibody IgG  -     Hepatitis B Surface Antibody  -     HEPATITIS B CORE ANTIBODY  -     HEPATITIS B SURFACE ANTIGEN  -     HEPATITS C ANTIBODY    Type 2 diabetes mellitus with hyperglycemia, without long-term current use of insulin (H)  -     metFORMIN (GLUCOPHAGE-XR) 500 MG 24 hr tablet; Take 1 tablet (500 mg) by mouth 2 times daily (with meals)  -     DIABETES EDUCATOR REFERRAL      RECOMMENDATIONS:                                                        Diabetes Medication Use  -----Hold usual  oral diabetic meds (e.g. Metformin, Actos, Glipizide) while NPO.       APPROVAL GIVEN to proceed with proposed procedure, without further diagnostic evaluation       Signed Electronically by: LOUIE Salcedo CNP    Copy of this evaluation report is provided to requesting physician.    [facility] Preop Guidelines

## 2017-06-13 NOTE — MR AVS SNAPSHOT
After Visit Summary   6/13/2017    Jose Luis Rae    MRN: [id number]           Patient Information     Date Of Birth          1975        Visit Information        Provider Department      6/13/2017 3:40 PM Melissa Liu APRN CNP [facility] [place]        Today's Diagnoses     Preop general physical exam    -  1    Kidney stone        Elevated glucose        CARDIOVASCULAR SCREENING; LDL GOAL LESS THAN 160        Screening for diabetes mellitus        Screening for thyroid disorder          Care Instructions    PLAN:   1.   Symptomatic therapy suggested: increase fluids and call prn if symptoms persist or worsen.    2.  Orders Placed This Encounter   Procedures     XR KUB     Hemoglobin A1c     CBC with platelets     Comprehensive metabolic panel     Albumin Random Urine Quantitative     TSH with free T4 reflex     Lipid panel reflex to direct LDL     3. Patient needs to follow up in if no improvement,or sooner if worsening of symptoms or other symptoms develop.  Will follow up and/or notify patient of  results via My Chart to determine further need for followup        Before Your Surgery      Call your surgeon if there is any change in your health. This includes signs of a cold or flu (such as a sore throat, runny nose, cough, rash or fever).    Do not smoke, drink alcohol or take over the counter medicine (unless your surgeon or primary care doctor tells you to) for the 24 hours before and after surgery.    If you take prescribed drugs: Follow your doctor s orders about which medicines to take and which to stop until after surgery.    Eating and drinking prior to surgery: follow the instructions from your surgeon    Take a shower or bath the night before surgery. Use the soap your surgeon gave you to gently clean your skin. If you do not have soap from your surgeon, use your regular soap. Do not shave or scrub the surgery site.  Wear clean pajamas and have clean sheets on  your bed.     It was a pleasure seeing you today at the [facility]. Thank you for allowing us to care for you today. We truly hope we provided you with the excellent service you deserve. Please let us know if there is anything else we can do for you so we can be sure you are leaving completley satisfied with your care experience.       General information about your clinic   Clinic Hours Lab Hours (Appointments are required)   Mon-Thurs: 7:30 AM - 7 PM Mon-Thurs: 7:30 AM - 7 PM   Fri: 7:30 AM - 5 PM Fri: 7:30 AM - 5 PM        After Hours Nurse Advise & Appts:  [facility] Nurse Advisors: [phone]  [facility] On Call: to make appointments anytime: [phone] On Call Physician: call [phone] and answering service will page the on call physician.        For urgent appointments, please call [phone] and ask for the triage nurse or your care team clinic nurse.  How to contact my care team:  [facility]: [email or web address]   Phone: [phone]   Fax: [phone]       [facility]:   Phone: [phone]  Hours: 8:00 AM - 6:00 PM  Medication Refills:  Call your pharmacy and they will forward the refill to us. Please allow 3 business days for your refills to be completed.       Normal or non-critical lab and imaging results will be communicated to you by MyChart, letter or phone within 7 days.  If you do not hear from us within 10 days, please call the clinic. If you have a critical or abnormal lab result, we will notify you by phone as soon as possible.       We now have PWIC (Pediatric Walk in Care)  Monday-Friday from 7:30-4. Simply walk in and be seen for your urgent needs like cough, fever, rash, diarrhea or vomiting, pink eye, UTI. No appointments needed. Ask one of the team for more information      -Your Care Team:    Dr. Jeff Doyle - Internal Medicine/Pediatrics   Dr. Jacqueline Song - Family Medicine  Dr. Elizabeth Hayes - Pediatrics  Melissa Liu CNP - Family  Practice Nurse Practitioner  Dr. Sharmin Delgado - Pediatrics  Dr. Brandyn Sherman - Internal Medicine                      Follow-ups after your visit        Your next 10 appointments already scheduled     Jun 20, 2017   Procedure with Ludwig Vasquez MD   [facility] ([facility])    [address] [place] [address]  5th Floor  [facility] [id number] [phone]           Located in the Clinics and Surgery Center at [address] [place] [address].   parking is very convenient and highly recommended.  is a $6 flat rate fee.  Both  and self parkers should enter the main arrival plaza from [facility]; parking attendants will direct you based on your parking preference.            Jul 21, 2017  2:00 PM CDT   XR KUB with UCXR1   [facility] ([facility])    [address] [place] [address]  1st [facility] [address] [phone]           Please bring a list of your current medicines to your exam. (Include vitamins, minerals and over-thecounter medicines.) Leave your valuables at home.  Tell your doctor if there is a chance you may be pregnant.  You do not need to do anything special for this exam.            Jul 21, 2017  2:45 PM CDT   (Arrive by 2:30 PM)   Post-Op with Guadalupe Thorpe MD   [facility] ([facility])    [address] [place] [address]  4th Floor  [facility] [address] [phone]              Future tests that were ordered for you today     Open Future Orders        Priority Expected Expires Ordered    XR KUB Routine 6/13/2017 6/13/2018 6/13/2017            Who to contact     If you have questions or need follow up information about today's clinic visit or your schedule please contact [facility] directly at [phone].  Normal or non-critical lab and imaging results will be communicated to you by  MyChart, letter or phone within 4 business days after the clinic has received the results. If you do not hear from us within 7 days, please contact the clinic through [facility] or phone. If you have a critical or abnormal lab result, we will notify you by phone as soon as possible.  Submit refill requests through [employer] or call your pharmacy and they will forward the refill request to us. Please allow 3 business days for your refill to be completed.          Additional Information About Your Visit        [employer] Information     [employer] is an electronic gateway that provides easy, online access to your medical records. With [employer], you can request a clinic appointment, read your test results, renew a prescription or communicate with your care team.     To sign up for [employer] visit the website at [email or web address]   You will be asked to enter the access code listed below, as well as some personal information. Please follow the directions to create your username and password.     Your access code is: [email or web address]  Expires: 2017  9:39 AM     Your access code will  in 90 days. If you need help or a new code, please contact your [facility] or call [phone] for assistance.        Care EveryWhere ID     This is your Care EveryWhere ID. This could be used by other organizations to access your [place] medical records  [id number]        Your Vitals Were     Pulse Temperature Pulse Oximetry BMI (Body Mass Index)          85 97.2  F (36.2  C) (Temporal) 96% 35.49 kg/m2         Blood Pressure from Last 3 Encounters:   17 130/75   17 (!) 141/99    Weight from Last 3 Encounters:   17 219 lb 14.4 oz (99.7 kg)   17 220 lb (99.8 kg)              We Performed the Following     Albumin Random Urine Quantitative     CBC with platelets     Comprehensive metabolic panel     Hemoglobin A1c     Lipid panel reflex to direct LDL     TSH with free T4 reflex         Primary Care Provider    None Specified       No primary provider on file.        Thank you!     Thank you for choosing [facility]  for your care. Our goal is always to provide you with excellent care. Hearing back from our patients is one way we can continue to improve our services. Please take a few minutes to complete the written survey that you may receive in the mail after your visit with us. Thank you!             Your Updated Medication List - Protect others around you: Learn how to safely use, store and throw away your medicines at www.disposemymeds.org.          This list is accurate as of: 6/13/17  4:12 PM.  Always use your most recent med list.                   Brand Name Dispense Instructions for use    tamsulosin 0.4 MG capsule    FLOMAX    30 capsule    Take 1 capsule (0.4 mg) by mouth daily       traMADol 50 MG tablet    ULTRAM

## 2017-06-13 NOTE — Clinical Note
Ekaterina Vasquez Saw Jose Luis Rae Yesterday and got all labs in and looks like indeed some uncontrolled diabetes  Will get him started on metformin now but with A1C being 10 I wanted to make sure you were aware Also his pain was improved and so we did a KUB yesterday to make sure he may have passed the stone and I routed that to you as well Please give me your thoughts related to the new diagnosis of diabetes if you are still comfortable procedure as we will get him started on treatment  Thanks  Melissa Liu, TULIO, APRN CNP

## 2017-06-14 ENCOUNTER — TELEPHONE (OUTPATIENT)
Dept: PEDIATRICS | Facility: CLINIC | Age: 42
End: 2017-06-14

## 2017-06-14 DIAGNOSIS — R79.89 ELEVATED LIVER FUNCTION TESTS: Primary | ICD-10-CM

## 2017-06-14 PROBLEM — E11.65 TYPE 2 DIABETES MELLITUS WITH HYPERGLYCEMIA, WITHOUT LONG-TERM CURRENT USE OF INSULIN (H): Status: ACTIVE | Noted: 2017-06-14

## 2017-06-14 LAB
CREAT UR-MCNC: 199 MG/DL
HBV CORE AB SERPL QL IA: NONREACTIVE
HBV SURFACE AB SERPL IA-ACNC: 0.59 M[IU]/ML
HBV SURFACE AG SERPL QL IA: NONREACTIVE
HCV AB SERPL QL IA: NORMAL
MICROALBUMIN UR-MCNC: 62 MG/L
MICROALBUMIN/CREAT UR: 31.06 MG/G CR (ref 0–17)

## 2017-06-14 RX ORDER — METFORMIN HCL 500 MG
500 TABLET, EXTENDED RELEASE 24 HR ORAL 2 TIMES DAILY WITH MEALS
Qty: 60 TABLET | Refills: 3 | Status: SHIPPED | OUTPATIENT
Start: 2017-06-14 | End: 2017-09-11

## 2017-06-14 NOTE — PROGRESS NOTES
Ekaterina Rae,    Attached are your test results.  Your stone is there but looks like it has moved.   I will route this to Dr. Vasquez as well so he is aware   Please contact us if you have any questions.    Melissa Liu, CNP

## 2017-06-14 NOTE — TELEPHONE ENCOUNTER
Notes Recorded by Melissa Liu APRN CNP on 6/14/2017 at 3:41 PM  Ekaterina Rae,    Attached are your test results.  -Microalbumin (urine protein) level is elevated. This is suggestive of early damage to your kidneys from diabetes.  ADVISE: avoiding anti-inflamatory agents such as ibuprofen (Advil, Motrin) or naproxen (Aleve) as much as possible, keeping your blood pressure in a normal range, and rechecking your microalbumin level in 3 months (microalbumin; DX: Microalbuminuria)   Please contact us if you have any questions.    Melissa Liu CNP    ------    Notes Recorded by Melissa Liu APRN CNP on 6/14/2017 at 8:07 AM  Please call patient   -Liver and gallbladder tests (ALT,AST, Alk phos,bilirubin) are significantly elevated. ADVISE: further testing - will check hepatitis panel   (DX: elevated LFTs)  -Kidney function (GFR) is normal.  -Sodium is normal.  -Potassium is normal.  -Glucose is elevated and a sign diabetes.  ADVISE:  -A1C test (average blood sugar the last 2-3 months) is above your goal.   ADVISE:  Will make a referral to diabetes ed and also start on medicine for your diabetes.   Also, recheck your A1C in 3 months too. (A1C, DX: diabetes)  -LDL(bad) cholesterol level is elevated, HDL(good) cholesterol level is low and your triglycerides are elevated which can increase your heart disease risk.  A diet high in fat and simple carbohydrates, genetics and being overweight can contribute to this. ADVISE: Exercise, a low fat, low carbohydrate diet, weight control, and omega-3 fatty acids (fish oil) 8571-2333 mg daily are helpful to improve this.  Rechecking your fasting cholesterol panel in 6 months is recommended (Lipid w/ LDL reflex, DX: hyperlipidemia)  -TSH (thyroid stimulating hormone) level is normal which indicates normal thyroid function.  -Normal red blood cell (hgb) levels, normal white blood cell count and normal platelet levels.    Am also going to route this to  Dr. Elaine as well

## 2017-06-14 NOTE — PROGRESS NOTES
Ekaterina Rae,    Attached are your test results.  -Microalbumin (urine protein) level is elevated. This is suggestive of early damage to your kidneys from diabetes.  ADVISE: avoiding anti-inflamatory agents such as ibuprofen (Advil, Motrin) or naproxen (Aleve) as much as possible, keeping your blood pressure in a normal range, and rechecking your microalbumin level in 3 months (microalbumin; DX: Microalbuminuria)   Please contact us if you have any questions.    Melissa Liu, CNP

## 2017-06-15 DIAGNOSIS — R79.89 ELEVATED LIVER FUNCTION TESTS: ICD-10-CM

## 2017-06-15 PROCEDURE — 36415 COLL VENOUS BLD VENIPUNCTURE: CPT | Performed by: NURSE PRACTITIONER

## 2017-06-15 PROCEDURE — 86708 HEPATITIS A ANTIBODY: CPT | Performed by: NURSE PRACTITIONER

## 2017-06-15 NOTE — TELEPHONE ENCOUNTER
Patient given lab results below from Yue Liu.    He is taking 600mg TID ibuprofen due to kidney stones from his urologist.  He will contact his urologist for alternative pain medicine.    He will call back to schedule appointment with diabetic educator, 3month follow up and 6 month follow up with Yue Liu with labs.       Yue stated will be checking hepatitis panel.  Patient still need hep A lab drawn. Patient will call to schedule to lab only appt.      Isabelle STEPHENSON RN,   [facility], [facility]

## 2017-06-15 NOTE — TELEPHONE ENCOUNTER
Left message for patient to return call to clinic and ask to speak with RN or can check mychart for lab results.    Isabelle Ferguson RN,   [facility], [facility]

## 2017-06-16 ENCOUNTER — TELEPHONE (OUTPATIENT)
Dept: UROLOGY | Facility: CLINIC | Age: 42
End: 2017-06-16

## 2017-06-16 LAB — HAV IGG SER QL IA: NORMAL

## 2017-06-16 NOTE — TELEPHONE ENCOUNTER
----- Message from Nina Elaine RN sent at 6/16/2017 10:27 AM CDT -----  Regarding: FW: Pt has some questions about medications leading up to his surgery  Contact: [phone]  Tylenol is fine for him to use. Can you please let the patient know.  ----- Message -----     From: Nina Elaine RN     Sent: 6/16/2017  10:26 AM       To: Nina Elaine RN  Subject: RE: Pt has some questions about medications #    Tylenol is fine for him to use. Can you please let the patient know.  ----- Message -----     From: Nina Elaine RN     Sent: 6/16/2017       To: Nina Elaine RN  Subject: FW: Pt has some questions about medications #        ----- Message -----     From: Shanta Mrash LPN     Sent: 6/15/2017  11:36 AM       To: Nina Elaine RN  Subject: FW: Pt has some questions about medications #    Nina,    Can you ask Dr. Vasquez about this.    -Shanta  ----- Message -----     From: Barron Sherman     Sent: 6/15/2017  11:25 AM       To: [facility] Triage-  Subject: Pt has some questions about medications lead#    Good Morning Team,    Pt called today because when he was told about his kidney stones, he also found out that he had High Blood Sugar and the Providers were wanting him to get off of using Ibuprofen for inflammation and pain issues, and pt was wondering if Tyelonel would be a good Substitute leading into surgery and after. Please confer with Providers and call pt back to discuss possible changes he can make to help in now and in furture.    Thank you    Barron :)    Please DO NOT send this message and/or reply back to sender.  Call Center Representatives DO NOT respond to messages.

## 2017-06-16 NOTE — TELEPHONE ENCOUNTER
Called and let patient know that Tylenol is ok to take per Dr. Vasquez. Patient stated understanding.  Shanta Marsh LPN

## 2017-06-16 NOTE — PROGRESS NOTES
Ekaterina Rae,    Attached are your test results.  Hepatitis A is negative   Liver elevation likely due to fatty liver and will recheck levels in 3 months on follow up    Please contact us if you have any questions.    Melissa Liu, CNP

## 2017-06-17 ENCOUNTER — TRANSFERRED RECORDS (OUTPATIENT)
Dept: HEALTH INFORMATION MANAGEMENT | Facility: CLINIC | Age: 42
End: 2017-06-17

## 2017-06-19 ENCOUNTER — ANESTHESIA EVENT (OUTPATIENT)
Dept: SURGERY | Facility: AMBULATORY SURGERY CENTER | Age: 42
End: 2017-06-19

## 2017-06-19 ENCOUNTER — OFFICE VISIT (OUTPATIENT)
Dept: NURSING | Facility: CLINIC | Age: 42
End: 2017-06-19
Attending: NURSE PRACTITIONER
Payer: COMMERCIAL

## 2017-06-19 DIAGNOSIS — E11.9 DIABETES MELLITUS WITHOUT COMPLICATION (H): Primary | ICD-10-CM

## 2017-06-19 PROCEDURE — G0108 DIAB MANAGE TRN  PER INDIV: HCPCS

## 2017-06-19 NOTE — PROGRESS NOTES
"  Diabetes Self Management Training: Individual Review Visit    Jose Luis Rae presents today for education and evaluation of glucose control related to Type 2 diabetes. Diagnosed 05/28/17.     He is accompanied by self    Patient's diabetes management related comments/concerns: learning how to control bg levels.      Patient's emotional response to diabetes: expresses readiness to learn    Patient would like this visit to be focused around the following diabetes-related behaviors and goals: Assistance with making lifestyle changes    ASSESSMENT:  Patient Problem List and Family Medical History reviewed for relevant medical history, current medical status, and diabetes risk factors.    Current Diabetes Management per Patient:  Taking diabetes medications? Yes: States started medication \"about four days ago\".    Diabetes Medication(s)     Biguanides Sig    metFORMIN (GLUCOPHAGE-XR) 500 MG 24 hr tablet Take 1 tablet (500 mg) by mouth 2 times daily (with meals)         Patient glucose self monitoring as follows: one time daily.   BG meter: purchased a generic meter. Taught how to use the One Touch Verio meter today. Rx done for test strips.   BG results: bg checked while in clinic. Result = 146 - 3 hrs post breakfast.      BG values are: Not in goal  Patient's most recent   Lab Results   Component Value Date    A1C 10.1 06/13/2017    is not meeting goal of < 6.0    Nutrition:  Patient eats 2 meals per day and skips breakfast regularly. States has lost 20 lbs since Jan 1, 2017.     Breakfast - usually a protein bar about 2 hours after arising.   Lunch - Lean Cuisine and yogurt, sometimes adds a small bag of Doritoes.    Dinner - fajitas or chicken breast with Stove Top stuffing or steak with mashed potatoes.   Snacks - rarely    Beverages: diet Mountain Dew, water, occasional alcohol.     Cultural/Jew diet restrictions: No     Biggest Challenge to Healthy Eating: none    Physical Activity:    No regular " "exercise.     Diabetes Risk Factors:  overweight/obesity and inactivity    Diabetes Complications:  Not discussed today.    Vitals:  There were no vitals taken for this visit.  Estimated body mass index is 35.49 kg/(m^2) as calculated from the following:    Height as of 6/2/17: 1.676 m (5' 6\").    Weight as of 6/13/17: 99.7 kg (219 lb 14.4 oz).   Last 3 BP:   BP Readings from Last 3 Encounters:   06/13/17 130/75   06/02/17 (!) 141/99       History   Smoking Status     Never Smoker   Smokeless Tobacco     Never Used       Labs:  Lab Results   Component Value Date    A1C 10.1 06/13/2017     Lab Results   Component Value Date     06/13/2017     Lab Results   Component Value Date     06/13/2017     HDL Cholesterol   Date Value Ref Range Status   06/13/2017 40 >39 mg/dL Final   ]  GFR Estimate   Date Value Ref Range Status   06/13/2017 >90  Non  GFR Calc   >60 mL/min/1.7m2 Final     GFR Estimate If Black   Date Value Ref Range Status   06/13/2017 >90   GFR Calc   >60 mL/min/1.7m2 Final     Lab Results   Component Value Date    CR 0.78 06/13/2017     No results found for: MICROALBUMIN    Socio/Economic Considerations:    Support system: not assessed    Health Beliefs and Attitudes:   Patient Activation Measure Survey Score:  No flowsheet data found.    Stage of Change: CONTEMPLATION (Considering change and yet undecided)      Diabetes knowledge and skills assessment:     Patient is knowledgeable in diabetes management concepts related to: Monitoring and Taking Medication    Patient needs further education on the following diabetes management concepts: Healthy Eating and Being Active    Barriers to Learning Assessment: No Barriers identified    Based on learning assessment above, most appropriate setting for further diabetes education would be: Individual setting.    INTERVENTION:   Education provided today on:  AADE Self-Care Behaviors:  Healthy Eating: carbohydrate counting, " consistency in amount, composition, and timing of food intake and plate planning method  Being Active: relationship to blood glucose and describe appropriate activity program  Monitoring: individual blood glucose targets and frequency of monitoring    Opportunities for ongoing education and support in diabetes-self management were discussed.    Pt verbalized understanding of concepts discussed and recommendations provided today.       Education Materials Provided:  My Plate Planner, 100 Calorie Snack sheet, Hypoglycemia: s/s and tx.    PLAN:  See Patient Instructions for co-developed, patient-stated behavior change goals.  Meal Plan Recommendation: use plate planning method and aim for 30g carbs for breakfast, 60-75g carbs for lunch and dinner, 15g carbs for 2-3 snacks per day. Meals to be composed of a source of carbs, protein and a healthy fat.    Exercise / activity plan: walk most days of the week for several minutes.   Check blood sugars once daily, alternating between fasting and 2 hours post-meal. Fastin goal bg: < 120, 2 hour spost meal bg goal: < 150.     FOLLOW-UP:  Call cde with any questions or concerns.     Time Spent: 60 minutes  Encounter Type: Individual    Michelle Fabian, RN, BSN, CDE   [facility]

## 2017-06-19 NOTE — MR AVS SNAPSHOT
After Visit Summary   6/19/2017    Jose Luis Rae    MRN: [id number]           Patient Information     Date Of Birth          1975        Visit Information        Provider Department      6/19/2017 11:00 AM Provider, [facility]        Today's Diagnoses     Diabetes mellitus without complication (H)    -  1       Follow-ups after your visit        Your next 10 appointments already scheduled     Jun 20, 2017   Procedure with Ludwig Vasquez MD   [facility] ([facility])    [address] [place] [address]  [address]th [facility] [address] [phone]           Located in the Clinics and Surgery Center at [address] [place] [address].   parking is very convenient and highly recommended.  is a $6 flat rate fee.  Both  and self parkers should enter the main arrival plaza from [facility]; parking attendants will direct you based on your parking preference.            Jul 21, 2017  2:00 PM CDT   XR KUB with UCXR1   [facility] ([facility])    [address] [place] [address]  1st [facility] [id number] [phone]           Please bring a list of your current medicines to your exam. (Include vitamins, minerals and over-thecounter medicines.) Leave your valuables at home.  Tell your doctor if there is a chance you may be pregnant.  You do not need to do anything special for this exam.            Jul 21, 2017  2:45 PM CDT   (Arrive by 2:30 PM)   Post-Op with Guadalupe Thorpe MD   [facility] ([facility])    [address] [place] [address]  4th [facility] [id number] [phone]            Sep 11, 2017  8:00 AM CDT   Eliehart Long with LOUIE Salcedo CNP   [facility] ([facility])    [address] [place] [address]    [phone]            Dec 11, 2017  8:00 AM CST   MyChart Long with LOUIE Salcedo CNP   [facility] ([facility])    [address] [place] [address] [phone]              Who to contact     If you have questions or need follow up information about today's clinic visit or your schedule please contact [facility] directly at [phone].  Normal or non-critical lab and imaging results will be communicated to you by MyChart, letter or phone within 4 business days after the clinic has received the results. If you do not hear from us within 7 days, please contact the clinic through MyChart or phone. If you have a critical or abnormal lab result, we will notify you by phone as soon as possible.  Submit refill requests through [employer] or call your pharmacy and they will forward the refill request to us. Please allow 3 business days for your refill to be completed.          Additional Information About Your Visit        CRVhart Information     [employer] gives you secure access to your electronic health record. If you see a primary care provider, you can also send messages to your care team and make appointments. If you have questions, please call your primary care clinic.  If you do not have a primary care provider, please call [phone] and they will assist you.      [employer] is an electronic gateway that provides easy, online access to your medical records. With [employer], you can request a clinic appointment, read your test results, renew a prescription or communicate with your care team.     To access your existing account, please contact your [facility] or call [phone] for assistance.        Care EveryWhere ID     This is your Care EveryWhere ID. This could be used by other organizations to access your [facility] medical records  [id number]         Blood Pressure from Last 3 Encounters:   06/13/17  130/75   06/02/17 (!) 141/99    Weight from Last 3 Encounters:   06/13/17 99.7 kg (219 lb 14.4 oz)   06/02/17 99.8 kg (220 lb)              We Performed the Following     DIABETES EDUCATION - Individual  []          Today's Medication Changes          These changes are accurate as of: 6/19/17  5:10 PM.  If you have any questions, ask your nurse or doctor.               Start taking these medicines.        Dose/Directions    blood glucose monitoring test strip   Commonly known as:  no brand specified   Used for:  Diabetes mellitus, type 2 (H)   Started by:  Jovita Dubon        Needs One Touch Verio test strip. Use to test blood sugars 1-2 times daily or as directed   Quantity:  50 strip   Refills:  3            Where to get your medicines      These medications were sent to [facility] [address] [place] [address] [address] [address] [id number] [facility] [address]     Phone:  [phone]     blood glucose monitoring test strip                Primary Care Provider    None Specified       No primary provider on file.        Thank you!     Thank you for choosing [facility]  for your care. Our goal is always to provide you with excellent care. Hearing back from our patients is one way we can continue to improve our services. Please take a few minutes to complete the written survey that you may receive in the mail after your visit with us. Thank you!             Your Updated Medication List - Protect others around you: Learn how to safely use, store and throw away your medicines at www.disposemymeds.org.          This list is accurate as of: 6/19/17  5:10 PM.  Always use your most recent med list.                   Brand Name Dispense Instructions for use    ADVIL PO      Take 600 mg by mouth       blood glucose monitoring test strip    no brand specified    50 strip    Needs One Touch Verio test strip. Use to test blood sugars 1-2 times daily or as directed       metFORMIN 500 MG  24 hr tablet    GLUCOPHAGE-XR    60 tablet    Take 1 tablet (500 mg) by mouth 2 times daily (with meals)       tamsulosin 0.4 MG capsule    FLOMAX    30 capsule    Take 1 capsule (0.4 mg) by mouth daily       traMADol 50 MG tablet    ULTRAM

## 2017-06-20 ENCOUNTER — SURGERY (OUTPATIENT)
Age: 42
End: 2017-06-20

## 2017-06-20 ENCOUNTER — ANESTHESIA (OUTPATIENT)
Dept: SURGERY | Facility: AMBULATORY SURGERY CENTER | Age: 42
End: 2017-06-20

## 2017-06-20 ENCOUNTER — HOSPITAL ENCOUNTER (OUTPATIENT)
Facility: AMBULATORY SURGERY CENTER | Age: 42
End: 2017-06-20
Attending: UROLOGY

## 2017-06-20 VITALS
BODY MASS INDEX: 35.2 KG/M2 | OXYGEN SATURATION: 96 % | DIASTOLIC BLOOD PRESSURE: 99 MMHG | WEIGHT: 219 LBS | TEMPERATURE: 97.1 F | RESPIRATION RATE: 15 BRPM | HEIGHT: 66 IN | SYSTOLIC BLOOD PRESSURE: 154 MMHG

## 2017-06-20 DIAGNOSIS — N20.1 CALCULUS OF URETER: Primary | ICD-10-CM

## 2017-06-20 LAB
GLUCOSE BLDC GLUCOMTR-MCNC: 142 MG/DL (ref 70–99)
GLUCOSE BLDC GLUCOMTR-MCNC: 159 MG/DL (ref 70–99)

## 2017-06-20 RX ORDER — LIDOCAINE HYDROCHLORIDE 20 MG/ML
INJECTION, SOLUTION INFILTRATION; PERINEURAL PRN
Status: DISCONTINUED | OUTPATIENT
Start: 2017-06-20 | End: 2017-06-20

## 2017-06-20 RX ORDER — CEFAZOLIN SODIUM 1 G/3ML
1 INJECTION, POWDER, FOR SOLUTION INTRAMUSCULAR; INTRAVENOUS SEE ADMIN INSTRUCTIONS
Status: DISCONTINUED | OUTPATIENT
Start: 2017-06-20 | End: 2017-06-20 | Stop reason: HOSPADM

## 2017-06-20 RX ORDER — LIDOCAINE 40 MG/G
CREAM TOPICAL
Status: DISCONTINUED | OUTPATIENT
Start: 2017-06-20 | End: 2017-06-20 | Stop reason: HOSPADM

## 2017-06-20 RX ORDER — ONDANSETRON 2 MG/ML
4 INJECTION INTRAMUSCULAR; INTRAVENOUS EVERY 30 MIN PRN
Status: DISCONTINUED | OUTPATIENT
Start: 2017-06-20 | End: 2017-06-21 | Stop reason: HOSPADM

## 2017-06-20 RX ORDER — MEPERIDINE HYDROCHLORIDE 25 MG/ML
12.5 INJECTION INTRAMUSCULAR; INTRAVENOUS; SUBCUTANEOUS
Status: DISCONTINUED | OUTPATIENT
Start: 2017-06-20 | End: 2017-06-21 | Stop reason: HOSPADM

## 2017-06-20 RX ORDER — SODIUM CHLORIDE, SODIUM LACTATE, POTASSIUM CHLORIDE, CALCIUM CHLORIDE 600; 310; 30; 20 MG/100ML; MG/100ML; MG/100ML; MG/100ML
INJECTION, SOLUTION INTRAVENOUS CONTINUOUS
Status: DISCONTINUED | OUTPATIENT
Start: 2017-06-20 | End: 2017-06-21 | Stop reason: HOSPADM

## 2017-06-20 RX ORDER — ONDANSETRON 2 MG/ML
INJECTION INTRAMUSCULAR; INTRAVENOUS PRN
Status: DISCONTINUED | OUTPATIENT
Start: 2017-06-20 | End: 2017-06-20

## 2017-06-20 RX ORDER — FENTANYL CITRATE 50 UG/ML
25-50 INJECTION, SOLUTION INTRAMUSCULAR; INTRAVENOUS
Status: DISCONTINUED | OUTPATIENT
Start: 2017-06-20 | End: 2017-06-21 | Stop reason: HOSPADM

## 2017-06-20 RX ORDER — DEXAMETHASONE SODIUM PHOSPHATE 4 MG/ML
INJECTION, SOLUTION INTRA-ARTICULAR; INTRALESIONAL; INTRAMUSCULAR; INTRAVENOUS; SOFT TISSUE PRN
Status: DISCONTINUED | OUTPATIENT
Start: 2017-06-20 | End: 2017-06-20

## 2017-06-20 RX ORDER — PROPOFOL 10 MG/ML
INJECTION, EMULSION INTRAVENOUS CONTINUOUS PRN
Status: DISCONTINUED | OUTPATIENT
Start: 2017-06-20 | End: 2017-06-20

## 2017-06-20 RX ORDER — PROPOFOL 10 MG/ML
INJECTION, EMULSION INTRAVENOUS PRN
Status: DISCONTINUED | OUTPATIENT
Start: 2017-06-20 | End: 2017-06-20

## 2017-06-20 RX ORDER — ONDANSETRON 4 MG/1
4 TABLET, ORALLY DISINTEGRATING ORAL EVERY 30 MIN PRN
Status: DISCONTINUED | OUTPATIENT
Start: 2017-06-20 | End: 2017-06-21 | Stop reason: HOSPADM

## 2017-06-20 RX ORDER — SODIUM CHLORIDE, SODIUM LACTATE, POTASSIUM CHLORIDE, CALCIUM CHLORIDE 600; 310; 30; 20 MG/100ML; MG/100ML; MG/100ML; MG/100ML
INJECTION, SOLUTION INTRAVENOUS CONTINUOUS PRN
Status: DISCONTINUED | OUTPATIENT
Start: 2017-06-20 | End: 2017-06-20

## 2017-06-20 RX ORDER — TRAMADOL HYDROCHLORIDE 50 MG/1
50 TABLET ORAL EVERY 6 HOURS PRN
Qty: 30 TABLET | Refills: 0 | Status: SHIPPED | OUTPATIENT
Start: 2017-06-20 | End: 2017-09-11

## 2017-06-20 RX ORDER — KETOROLAC TROMETHAMINE 10 MG/1
10 TABLET, FILM COATED ORAL EVERY 6 HOURS PRN
COMMUNITY
End: 2017-09-11

## 2017-06-20 RX ORDER — PROMETHAZINE HYDROCHLORIDE 25 MG/ML
12.5 INJECTION, SOLUTION INTRAMUSCULAR; INTRAVENOUS
Status: DISCONTINUED | OUTPATIENT
Start: 2017-06-20 | End: 2017-06-21 | Stop reason: HOSPADM

## 2017-06-20 RX ORDER — SODIUM CHLORIDE, SODIUM LACTATE, POTASSIUM CHLORIDE, CALCIUM CHLORIDE 600; 310; 30; 20 MG/100ML; MG/100ML; MG/100ML; MG/100ML
INJECTION, SOLUTION INTRAVENOUS CONTINUOUS
Status: DISCONTINUED | OUTPATIENT
Start: 2017-06-20 | End: 2017-06-20 | Stop reason: HOSPADM

## 2017-06-20 RX ORDER — NALOXONE HYDROCHLORIDE 0.4 MG/ML
.1-.4 INJECTION, SOLUTION INTRAMUSCULAR; INTRAVENOUS; SUBCUTANEOUS
Status: DISCONTINUED | OUTPATIENT
Start: 2017-06-20 | End: 2017-06-21 | Stop reason: HOSPADM

## 2017-06-20 RX ORDER — GABAPENTIN 300 MG/1
300 CAPSULE ORAL ONCE
Status: COMPLETED | OUTPATIENT
Start: 2017-06-20 | End: 2017-06-20

## 2017-06-20 RX ORDER — ACETAMINOPHEN 325 MG/1
975 TABLET ORAL ONCE
Status: COMPLETED | OUTPATIENT
Start: 2017-06-20 | End: 2017-06-20

## 2017-06-20 RX ADMIN — SODIUM CHLORIDE, SODIUM LACTATE, POTASSIUM CHLORIDE, CALCIUM CHLORIDE: 600; 310; 30; 20 INJECTION, SOLUTION INTRAVENOUS at 14:42

## 2017-06-20 RX ADMIN — Medication 0.4 MG: at 14:55

## 2017-06-20 RX ADMIN — SODIUM CHLORIDE, SODIUM LACTATE, POTASSIUM CHLORIDE, CALCIUM CHLORIDE: 600; 310; 30; 20 INJECTION, SOLUTION INTRAVENOUS at 15:15

## 2017-06-20 RX ADMIN — LIDOCAINE HYDROCHLORIDE 80 MG: 20 INJECTION, SOLUTION INFILTRATION; PERINEURAL at 14:55

## 2017-06-20 RX ADMIN — GABAPENTIN 300 MG: 300 CAPSULE ORAL at 12:21

## 2017-06-20 RX ADMIN — PROPOFOL 250 MG: 10 INJECTION, EMULSION INTRAVENOUS at 14:55

## 2017-06-20 RX ADMIN — ONDANSETRON 4 MG: 2 INJECTION INTRAMUSCULAR; INTRAVENOUS at 14:42

## 2017-06-20 RX ADMIN — ACETAMINOPHEN 975 MG: 325 TABLET ORAL at 12:21

## 2017-06-20 RX ADMIN — SODIUM CHLORIDE, SODIUM LACTATE, POTASSIUM CHLORIDE, CALCIUM CHLORIDE: 600; 310; 30; 20 INJECTION, SOLUTION INTRAVENOUS at 12:20

## 2017-06-20 RX ADMIN — DEXAMETHASONE SODIUM PHOSPHATE 4 MG: 4 INJECTION, SOLUTION INTRA-ARTICULAR; INTRALESIONAL; INTRAMUSCULAR; INTRAVENOUS; SOFT TISSUE at 14:42

## 2017-06-20 RX ADMIN — PROPOFOL 150 MCG/KG/MIN: 10 INJECTION, EMULSION INTRAVENOUS at 14:55

## 2017-06-20 NOTE — OP NOTE
PREOPERATIVE DIAGNOSIS: Left ureteral stone  POSTOPERATIVE DIAGNOSIS: Same  PROCEDURE: Extracorporeal shockwave lithotripsy of kidney stone  INDICATIONS: Alfreda Apple is a 42 year old male who was found to have kidney stones.  He was evaluated and after discussion of options (observation, ureteroscopy with laser lithotripsy, and extracorporeal shockwave lithtripsy), he decided on pursuing extracorporeal shockwave lithtripsy. The risks include but are not limited to heart attack, stroke, blood clot to the lungs, death, bleeding, injury to organs surrounding the kidney, injury to the kidney, incomplete passage of stones, or death.    SURGEON: Ludwig Vasquez MD   RESIDENT SURGEON: None  ESTIMATED BLOOD LOSS: Zero.   COMPLICATIONS: None.   DRAINS AND TUBES: None.   FINDINGS: Stone well fragmented.  DETAILS OF PROCEDURE:   The patient was properly identified in preoperative area. He was brought to the operating room, placed in prone position. General anesthesia was induced. A timeout was taken. He  was positioned over the fluoroscopy unit of the shockwave table in prone position. We were able to see the stone well. The extracorporeal shockwave unit was then brought in and we delivered 2400 shocks to the visualized stone.  We did recheck positioning of the lithotriptor fluoroscopically a number of times during the procedure.     FOLLOW-UP PLAN:  He will plan to follow-up in clinic with xray in 1 month  CC  Patient Care Team:  Ludwig Vasquez MD as MD (Urology)  Nina Elaine, ELMER as Registered Nurse  REFERRING DR, UNKNOWN    Copy to patient  ALFREDA APPLE  [address] [address] [place] [id number]

## 2017-06-20 NOTE — ANESTHESIA CARE TRANSFER NOTE
Patient: Jose Luis Rae    Procedure(s):  Left Extracorporal Shockwave Lithotripsy - Wound Class: I-Clean    Diagnosis: Stone  Diagnosis Additional Information: No value filed.    Anesthesia Type:   General, LMA     Note:  Airway :Face Mask  Patient transferred to:PACU  Comments: Arrive PACU, Stable, Airway Intact  127/76, 70,16,99,97.0      Vitals: (Last set prior to Anesthesia Care Transfer)    CRNA VITALS  6/20/2017 1509 - 6/20/2017 1544      6/20/2017             Pulse: 81    SpO2: 93 %    Resp Rate (observed): (!)  6    Resp Rate (set): 10                Electronically Signed By: LOUIE Anton CRNA  June 20, 2017  3:44 PM

## 2017-06-20 NOTE — ANESTHESIA POSTPROCEDURE EVALUATION
Patient: Jose Luis Rae    Procedure(s):  Left Extracorporal Shockwave Lithotripsy - Wound Class: I-Clean    Diagnosis:Stone  Diagnosis Additional Information: No value filed.    Anesthesia Type:  General, LMA    Note:  Anesthesia Post Evaluation    Patient location during evaluation: PACU  Patient participation: Able to fully participate in evaluation  Level of consciousness: awake  Pain management: adequate  Airway patency: patent  Cardiovascular status: acceptable  Respiratory status: acceptable  Hydration status: balanced  PONV: none     Anesthetic complications: None          Last vitals:  Vitals:    06/20/17 1550 06/20/17 1605 06/20/17 1610   BP: 124/90 (!) 133/92 (!) 142/94   Resp: 14 16 16   Temp: 36.1  C (97  F) 36.1  C (97  F) 36.2  C (97.1  F)   SpO2: 99%  95%         Electronically Signed By: Vitaly Henriquez MD  June 20, 2017  4:42 PM

## 2017-06-20 NOTE — ANESTHESIA PREPROCEDURE EVALUATION
Anesthesia Evaluation     . Pt has had prior anesthetic.     No history of anesthetic complications          ROS/MED HX    ENT/Pulmonary:  - neg pulmonary ROS     Neurologic:  - neg neurologic ROS     Cardiovascular:  - neg cardiovascular ROS       METS/Exercise Tolerance:     Hematologic:  - neg hematologic  ROS       Musculoskeletal:  - neg musculoskeletal ROS       GI/Hepatic:  - neg GI/hepatic ROS       Renal/Genitourinary:     (+) Nephrolithiasis ,       Endo:     (+) type II DM Obesity, .      Psychiatric:  - neg psychiatric ROS       Infectious Disease:  - neg infectious disease ROS       Malignancy:      - no malignancy   Other:    - neg other ROS                 Physical Exam  Normal systems: cardiovascular, pulmonary and dental    Airway   Mallampati: III  TM distance: >3 FB  Neck ROM: full    Dental     Cardiovascular       Pulmonary                     Anesthesia Plan      History & Physical Review  History and physical reviewed and following examination; no interval change.    ASA Status:  2 .    NPO Status:  > 8 hours    Plan for General and LMA with Intravenous induction. Maintenance will be Balanced.    PONV prophylaxis:  Ondansetron (or other 5HT-3) and Dexamethasone or Solumedrol       Postoperative Care  Postoperative pain management:  IV analgesics and Oral pain medications.      Consents  Anesthetic plan, risks, benefits and alternatives discussed with:  Patient..                          .

## 2017-06-20 NOTE — DISCHARGE INSTRUCTIONS
[facility]  Home Care Following Anesthesia  For 24 hours after surgery:  1. Get plenty of rest.  A responsible adult must stay with you for at least 24 hours after you leave the surgery center.  2. Do not drive or use heavy equipment.  If you have weakness or tingling, don't drive or use heavy equipment until this feeling goes away.   3. Do not drink alcohol.   4. Avoid strenuous or risky activities.  Ask for help when climbing stairs.  5. You may feel lightheaded.  IF so, sit for a few minutes before standing.  Have someone help you get up.   6. If you have nausea (feel sick to your stomach): Drink only clear liquids such as apple juice, ginger ale, broth or 7-Up.  Rest may also help.  Be sure to drink enough fluids.  Move to a regular diet as you feel able.   7. You may have a slight fever.  Call the doctor if your fever is over 100 F (37.7 C) (taken under the tongue) or lasts longer than 24 hours.  8. You may have a dry mouth, a sore throat, muscle aches or trouble sleeping. These should go away after 24 hours.  9. Do not make important or legal decisions.               Tips for taking pain medications  To get the best pain relief possible, remember these points:    Take pain medications as directed, before pain becomes severe.    Pain medication can upset your stomach: taking it with food may help.    Constipation is a common side effect of pain medication. Drink plenty of  fluids.    Eat foods high in fiber. Take a stool softener if recommended by your doctor or pharmacist.    Do not drink alcohol, drive or operate machinery while taking pain medications.    Ask about other ways to control pain, such as with heat, ice or relaxation.    Call a doctor for any of the followin. Signs of infection (fever, growing tenderness at the surgery site, a large amount of drainage or bleeding, severe pain, foul-smelling drainage, redness, swelling).  2. It has been over 8 to 10 hours  since surgery and you are still not able to urinate (pass water).  3. Headache for over 24 hours.  Your doctor is:  Dr. Ludwig Vasquez, Prostate and Urology: [phone]                Or dial [phone] and ask for the resident on call for:  Prostate Urology  For emergency care, call the:  [facility]:  [phone] (TTY for hearing impaired: [phone])

## 2017-06-20 NOTE — IP AVS SNAPSHOT
[facility]    [address] [place] [address]    Phone:  [phone]    Fax:  [phone]                                       After Visit Summary   6/20/2017    Jose Luis Rae    MRN: [id number]           After Visit Summary Signature Page     I have received my discharge instructions, and my questions have been answered. I have discussed any challenges I see with this plan with the nurse or doctor.    ..........................................................................................................................................  Patient/Patient Representative Signature      ..........................................................................................................................................  Patient Representative Print Name and Relationship to Patient    ..................................................               ................................................  Date                                            Time    ..........................................................................................................................................  Reviewed by Signature/Title    ...................................................              ..............................................  Date                                                            Time

## 2017-06-20 NOTE — IP AVS SNAPSHOT
MRN:[id number]                      After Visit Summary   6/20/2017    Jose Luis Rae    MRN: [id number]           Thank you!     Thank you for choosing [facility] for your care. Our goal is always to provide you with excellent care. Hearing back from our patients is one way we can continue to improve our services. Please take a few minutes to complete the written survey that you may receive in the mail after you visit with us. Thank you!        Patient Information     Date Of Birth          1975        About your hospital stay     You were admitted on:  June 20, 2017 [facility]    You were discharged on:  June 20, 2017       Who to Call     For medical emergencies, please call 911.  For non-urgent questions about your medical care, please call your primary care provider or clinic, None  For questions related to your surgery, please call your surgery clinic        Attending Provider     Provider Ludwig Castro MD Urology       Primary Care Provider    None Specified      After Care Instructions     Discharge Instructions       FOLLOW-UP  Follow-up in clinic in 4 week for xray to confirm stone passage.  Call or return sooner than your regularly scheduled visit if you develop any of the following:    Fever  Uncontrolled pain  Uncontrolled nausea or vomiting  Shortness of breath  Chest pain  Any other symptoms that are worrisome to you    ACTIVITY  No strenuous exercise for 2 weeks.  No lifting, pushing, pulling more than 15 pounds for 4 weeks.   Do not strain with bowel movements.  Do not drive until you are off of narcotic pain medication and can press the brake pedal quickly and fully without pain.   Do not operate a motor vehicle while taking narcotic pain medications.     PAIN MEDICATIONS  Take pain medication as needed for pain.    Do not take any additional Tylenol (acetaminophen) while using Percocet or  Vicodin  Do not take more than 4,000mg of Tylenol (acetaminophen) in any 24 hour period, as this can cause liver damage.  Wean yourself off of narcotic pain medications as tolerated    PREVENTION OF CONSTIPATION  Narcotic pain medication can cause constipation.  To prevent this, use the following measures.  Start with the treatments at the top and progress down the list until you have relief.  Prevention is important as this problem is easier to prevent than treat.    Stool softener such as Senna or Colace.  This will typically be prescribed for your  Fiber products such as Metamucil.  This is available over the counter without a prescription.  Miralax.  This is available over the counter without a prescription.  Do not use this product if you have significant renal failure.  Enema.  This is available over the counter without a prescription.  Some enemas may not be used if you have significant renal failure.    QUESTIONS  You may call the Urology Clinic during daytime hours at [phone].  If after hours, call [phone] and ask to speak with the Urology resident on call.                  Your next 10 appointments already scheduled     Jul 21, 2017  2:00 PM CDT   XR KUB with UCXR1   [facility] ([facility])    [address] [place] [address]  [address] [facility] [address] [phone]           Please bring a list of your current medicines to your exam. (Include vitamins, minerals and over-thecounter medicines.) Leave your valuables at home.  Tell your doctor if there is a chance you may be pregnant.  You do not need to do anything special for this exam.            Jul 21, 2017  2:45 PM CDT   (Arrive by 2:30 PM)   Post-Op with Guadalupe Thorpe MD   [facility] ([facility])    [address] [facility]  [address] [facility] [address] [phone]            Sep 11, 2017  8:00 AM CDT    Bolivar Kramer with LOUIE Salcedo CNP   [facility] ([facility]    [address] [facility] [address] [phone]            Dec 11, 2017  8:00 AM CST   MyChart Long with LOUIE Salcedo CNP   [facility] ([facility])    [address] [facility] [address]   [phone]              Further instructions from your care team       [facility]  Home Care Following Anesthesia  For 24 hours after surgery:  1. Get plenty of rest.  A responsible adult must stay with you for at least 24 hours after you leave the surgery center.  2. Do not drive or use heavy equipment.  If you have weakness or tingling, don't drive or use heavy equipment until this feeling goes away.   3. Do not drink alcohol.   4. Avoid strenuous or risky activities.  Ask for help when climbing stairs.  5. You may feel lightheaded.  IF so, sit for a few minutes before standing.  Have someone help you get up.   6. If you have nausea (feel sick to your stomach): Drink only clear liquids such as apple juice, ginger ale, broth or 7-Up.  Rest may also help.  Be sure to drink enough fluids.  Move to a regular diet as you feel able.   7. You may have a slight fever.  Call the doctor if your fever is over 100 F (37.7 C) (taken under the tongue) or lasts longer than 24 hours.  8. You may have a dry mouth, a sore throat, muscle aches or trouble sleeping. These should go away after 24 hours.  9. Do not make important or legal decisions.               Tips for taking pain medications  To get the best pain relief possible, remember these points:    Take pain medications as directed, before pain becomes severe.    Pain medication can upset your stomach: taking it with food may help.    Constipation is a common side effect of pain medication. Drink plenty of  fluids.    Eat foods high in fiber. Take a stool softener  "if recommended by your doctor or pharmacist.    Do not drink alcohol, drive or operate machinery while taking pain medications.    Ask about other ways to control pain, such as with heat, ice or relaxation.    Call a doctor for any of the followin. Signs of infection (fever, growing tenderness at the surgery site, a large amount of drainage or bleeding, severe pain, foul-smelling drainage, redness, swelling).  2. It has been over 8 to 10 hours since surgery and you are still not able to urinate (pass water).  3. Headache for over 24 hours.  Your doctor is:  Dr. Ludwig Vasquez, Prostate and Urology: [phone]                Or dial [phone] and ask for the resident on call for:  Prostate Urology  For emergency care, call the:  [facility]:  [phone] (TTY for hearing impaired: [phone])                Pending Results     No orders found from 2017 to 2017.            Admission Information     Date & Time Provider Department Dept. Phone    2017 Ludwig Vasquez MD [facility] [phone]      Your Vitals Were     Blood Pressure Temperature Respirations Height Weight Pulse Oximetry    142/94 97.1  F (36.2  C) (Temporal) 16 1.676 m (5' 6\") 99.3 kg (219 lb) 95%    BMI (Body Mass Index)                   35.35 kg/m2           [employer] Information     [employer] gives you secure access to your electronic health record. If you see a primary care provider, you can also send messages to your care team and make appointments. If you have questions, please call your primary care clinic.  If you do not have a primary care provider, please call [phone] and they will assist you.      [employer] is an electronic gateway that provides easy, online access to your medical records. With [employer], you can request a clinic appointment, read your test results, renew a prescription or communicate with your care team.     To access your existing account, " please contact your [facility] or call [phone] for assistance.        Care EveryWhere ID     This is your Care EveryWhere ID. This could be used by other organizations to access your [facility] medical records  [id number]           Review of your medicines      CONTINUE these medicines which may have CHANGED, or have new prescriptions. If we are uncertain of the size of tablets/capsules you have at home, strength may be listed as something that might have changed.        Dose / Directions    traMADol 50 MG tablet   Commonly known as:  ULTRAM   This may have changed:    - how much to take  - when to take this  - reasons to take this  - additional instructions   Used for:  Calculus of ureter        Dose:  50 mg   Take 1 tablet (50 mg) by mouth every 6 hours as needed for pain maximum 8 tablet(s) per day   Quantity:  30 tablet   Refills:  0         CONTINUE these medicines which have NOT CHANGED        Dose / Directions    ADVIL PO        Dose:  600 mg   Take 600 mg by mouth   Refills:  0       blood glucose monitoring test strip   Commonly known as:  no brand specified   Used for:  Diabetes mellitus, type 2 (H)        Needs One Touch Verio test strip. Use to test blood sugars 1-2 times daily or as directed   Quantity:  50 strip   Refills:  3       ketorolac 10 MG tablet   Commonly known as:  TORADOL        Dose:  10 mg   Take 10 mg by mouth every 6 hours as needed for moderate pain   Refills:  0       metFORMIN 500 MG 24 hr tablet   Commonly known as:  GLUCOPHAGE-XR   Used for:  Type 2 diabetes mellitus with hyperglycemia, without long-term current use of insulin (H)        Dose:  500 mg   Take 1 tablet (500 mg) by mouth 2 times daily (with meals)   Quantity:  60 tablet   Refills:  3       tamsulosin 0.4 MG capsule   Commonly known as:  FLOMAX   Used for:  Calculus of kidney        Dose:  0.4 mg   Take 1 capsule (0.4 mg) by mouth daily   Quantity:  30 capsule   Refills:  0             Where to get your medicines      Some of these will need a paper prescription and others can be bought over the counter. Ask your nurse if you have questions.     Bring a paper prescription for each of these medications     traMADol 50 MG tablet                Protect others around you: Learn how to safely use, store and throw away your medicines at www.disposemymeds.org.             Medication List: This is a list of all your medications and when to take them. Check marks below indicate your daily home schedule. Keep this list as a reference.      Medications           Morning Afternoon Evening Bedtime As Needed    ADVIL PO   Take 600 mg by mouth                                blood glucose monitoring test strip   Commonly known as:  no brand specified   Needs One Touch Verio test strip. Use to test blood sugars 1-2 times daily or as directed                                ketorolac 10 MG tablet   Commonly known as:  TORADOL   Take 10 mg by mouth every 6 hours as needed for moderate pain                                metFORMIN 500 MG 24 hr tablet   Commonly known as:  GLUCOPHAGE-XR   Take 1 tablet (500 mg) by mouth 2 times daily (with meals)                                tamsulosin 0.4 MG capsule   Commonly known as:  FLOMAX   Take 1 capsule (0.4 mg) by mouth daily                                traMADol 50 MG tablet   Commonly known as:  ULTRAM   Take 1 tablet (50 mg) by mouth every 6 hours as needed for pain maximum 8 tablet(s) per day

## 2017-06-21 ENCOUNTER — MYC MEDICAL ADVICE (OUTPATIENT)
Dept: UROLOGY | Facility: CLINIC | Age: 42
End: 2017-06-21

## 2017-06-22 DIAGNOSIS — N20.0 CALCULUS OF KIDNEY: ICD-10-CM

## 2017-06-22 RX ORDER — TAMSULOSIN HYDROCHLORIDE 0.4 MG/1
0.4 CAPSULE ORAL DAILY
Qty: 30 CAPSULE | Refills: 0 | Status: SHIPPED | OUTPATIENT
Start: 2017-06-22 | End: 2017-09-11

## 2017-06-30 ENCOUNTER — TELEPHONE (OUTPATIENT)
Dept: ENDOCRINOLOGY | Facility: CLINIC | Age: 42
End: 2017-06-30

## 2017-06-30 NOTE — TELEPHONE ENCOUNTER
Spoke with pt. States he has done a few back to back bg checks using 2 different brands of meters and has gotten variable readings, some with a 30 point difference. Pt wondering if he is doing something wrong. Pt advised this is not an uncommon occurrence, even when using the same meter. Pt advised no need for concern as long as the point difference is no more than 30. If this does continue to happen, however, please call back and we will discuss using one meter only. Pt also reminded of the importance of making sure hands are cleaned prior to all finger sticks, either by using an alcohol wipe or washing hands. Pt verbalized understanding.    Michelle Fabian, RN, BSN, CDE   [facility]

## 2017-06-30 NOTE — TELEPHONE ENCOUNTER
[facility] Call Center    Phone Message    Name of Caller: Jose Luis    Phone Number: Home number on file [phone] (home)    Best time to return call: Any    May a detailed message be left on voicemail: yes    Relation to patient: Self    Reason for Call: Jose Luis said he is having different result when he is using his testing meters back to back.  Requesting a call to discuss.  Thank you.    Action Taken: Message routed to:  Adult Clinics: Endocrinology p [phone]

## 2017-07-17 ENCOUNTER — PRE VISIT (OUTPATIENT)
Dept: UROLOGY | Facility: CLINIC | Age: 42
End: 2017-07-17

## 2017-07-17 NOTE — TELEPHONE ENCOUNTER
Patient is coming in to see  for op post for kidney stone with a X ray before appointment. No need for a call

## 2017-07-21 ENCOUNTER — OFFICE VISIT (OUTPATIENT)
Dept: UROLOGY | Facility: CLINIC | Age: 42
End: 2017-07-21

## 2017-07-21 VITALS
HEIGHT: 66 IN | SYSTOLIC BLOOD PRESSURE: 136 MMHG | HEART RATE: 99 BPM | BODY MASS INDEX: 35.36 KG/M2 | WEIGHT: 220 LBS | DIASTOLIC BLOOD PRESSURE: 96 MMHG

## 2017-07-21 DIAGNOSIS — N20.1 CALCULUS OF URETER: Primary | ICD-10-CM

## 2017-07-21 RX ORDER — TAMSULOSIN HYDROCHLORIDE 0.4 MG/1
0.4 CAPSULE ORAL DAILY
Qty: 30 CAPSULE | Refills: 0 | Status: SHIPPED | OUTPATIENT
Start: 2017-07-21 | End: 2017-09-11

## 2017-07-21 ASSESSMENT — PAIN SCALES - GENERAL: PAINLEVEL: NO PAIN (0)

## 2017-07-21 NOTE — PROGRESS NOTES
UROLOGIC DIAGNOSES:       CURRENT INTERVENTIONS:       HISTORY:       Patient s/p ESWL for left distal calculus noted on outside CT scan and followed with serial KUB. When patient had not passed the stone, he was offered ESWL.   This is the patient's first stone episode.     KUB today showed     IMPRESSION: 7 mm radiodensity within the left hemipelvis, likely at  the left UVJ junction, previously was in the left ureter.         Reviewed this finding with the patient and further follow up.       PAST MEDICAL HISTORY:   Past Medical History:   Diagnosis Date     Abnormal liver function tests 10/12/2012     Calculus of kidney      Diabetes (H)      Hypertriglyceridemia 10/12/2012     Overweight 10/12/2012       PAST SURGICAL HISTORY:   Past Surgical History:   Procedure Laterality Date     ADENOIDECTOMY       EXTRACORPOREAL SHOCK WAVE LITHOTRIPSY (ESWL) Left 6/20/2017    Procedure: EXTRACORPOREAL SHOCK WAVE LITHOTRIPSY (ESWL);  Left Extracorporal Shockwave Lithotripsy;  Surgeon: Ludwig Vasquez MD;  Location: [facility] OR       FAMILY HISTORY:   Family History   Problem Relation Age of Onset     DIABETES Maternal Grandfather      Coronary Artery Disease Maternal Grandfather      Hypertension Maternal Grandfather      Hyperlipidemia Maternal Grandfather      Lung Cancer Maternal Grandfather      Thyroid Disease Mother      CEREBROVASCULAR DISEASE No family hx of      Breast Cancer No family hx of      Colon Cancer No family hx of      Prostate Cancer No family hx of      Other Cancer No family hx of      Depression No family hx of      Anxiety Disorder No family hx of      MENTAL ILLNESS No family hx of      Substance Abuse No family hx of      Anesthesia Reaction No family hx of      Asthma No family hx of      OSTEOPOROSIS No family hx of      Genetic Disorder No family hx of      Obesity No family hx of      Unknown/Adopted No family hx of        SOCIAL HISTORY:   Social History   Substance Use Topics     Smoking  "status: Never Smoker     Smokeless tobacco: Never Used     Alcohol use Yes      Comment: maybe once a week        Current Outpatient Prescriptions   Medication     tamsulosin (FLOMAX) 0.4 MG capsule     ketorolac (TORADOL) 10 MG tablet     traMADol (ULTRAM) 50 MG tablet     Ibuprofen (ADVIL PO)     blood glucose monitoring (NO BRAND SPECIFIED) test strip     metFORMIN (GLUCOPHAGE-XR) 500 MG 24 hr tablet     tamsulosin (FLOMAX) 0.4 MG capsule     No current facility-administered medications for this visit.          PHYSICAL EXAM:    BP (!) 136/96  Pulse 99  Ht 1.676 m (5' 6\")  Wt 99.8 kg (220 lb)  BMI 35.51 kg/m2    HEENT: Normocephalic and atraumatic   Cardiac: Not done  Back/Flank: Not done  CNS/PNS: Not done  Respiratory: Normal non-labored breathing  Abdomen: Soft nontender and nondistended  Peripheral Vascular: Not done  Mental Status: Not done    Penis: Not done  Scrotal Skin: Not done  Testicles: Not done  Epididymis: Not done  Digital Rectal Exam:     Cystoscopy: Not done    Imaging: None    Urinalysis: UA RESULTS:  Recent Labs   Lab Test  06/02/17   1625   COLOR  Straw   APPEARANCE  Clear   URINEGLC  >499*   URINEBILI  Negative   URINEKETONE  Negative   SG  1.015   UBLD  Small*   URINEPH  5.0   PROTEIN  Negative   NITRITE  Negative   LEUKEST  Negative   RBCU  0   WBCU  1           Post Void Residual:     Other labs: None today      IMPRESSION:  43 y/o M s/p ESWL for distal ureteral calculus with residual calcification in the distal ureter on KUB     PLAN:  Patient was offered but declined stone risk assessment including 24 hr urine   Repeat KUB in one month   Follow up in one month with Dr. Vasquez     Total Time: 15 minutes                                      Total in Consultation: greater than 50%     "

## 2017-07-21 NOTE — LETTER
7/21/2017       RE: Jose Luis Rae  [id number] [facility] [address]     Dear Colleague,    Thank you for referring your patient, Jose Luis Rae, to the [facility] UROLOGY AND INST FOR PROSTATE AND UROLOGIC CANCERS at [facility]. Please see a copy of my visit note below.    UROLOGIC DIAGNOSES:       CURRENT INTERVENTIONS:       HISTORY:       Patient s/p ESWL for left distal calculus noted on outside CT scan and followed with serial KUB. When patient had not passed the stone, he was offered ESWL.   This is the patient's first stone episode.     KUB today showed     IMPRESSION: 7 mm radiodensity within the left hemipelvis, likely at  the left UVJ junction, previously was in the left ureter.         Reviewed this finding with the patient and further follow up.       PAST MEDICAL HISTORY:   Past Medical History:   Diagnosis Date     Abnormal liver function tests 10/12/2012     Calculus of kidney      Diabetes (H)      Hypertriglyceridemia 10/12/2012     Overweight 10/12/2012       PAST SURGICAL HISTORY:   Past Surgical History:   Procedure Laterality Date     ADENOIDECTOMY       EXTRACORPOREAL SHOCK WAVE LITHOTRIPSY (ESWL) Left 6/20/2017    Procedure: EXTRACORPOREAL SHOCK WAVE LITHOTRIPSY (ESWL);  Left Extracorporal Shockwave Lithotripsy;  Surgeon: Ludwig Vasquez MD;  Location:  OR       FAMILY HISTORY:   Family History   Problem Relation Age of Onset     DIABETES Maternal Grandfather      Coronary Artery Disease Maternal Grandfather      Hypertension Maternal Grandfather      Hyperlipidemia Maternal Grandfather      Lung Cancer Maternal Grandfather      Thyroid Disease Mother      CEREBROVASCULAR DISEASE No family hx of      Breast Cancer No family hx of      Colon Cancer No family hx of      Prostate Cancer No family hx of      Other Cancer No family hx of      Depression No family hx of      Anxiety Disorder No family hx of      MENTAL ILLNESS No family hx of   "    Substance Abuse No family hx of      Anesthesia Reaction No family hx of      Asthma No family hx of      OSTEOPOROSIS No family hx of      Genetic Disorder No family hx of      Obesity No family hx of      Unknown/Adopted No family hx of        SOCIAL HISTORY:   Social History   Substance Use Topics     Smoking status: Never Smoker     Smokeless tobacco: Never Used     Alcohol use Yes      Comment: maybe once a week        Current Outpatient Prescriptions   Medication     tamsulosin (FLOMAX) 0.4 MG capsule     ketorolac (TORADOL) 10 MG tablet     traMADol (ULTRAM) 50 MG tablet     Ibuprofen (ADVIL PO)     blood glucose monitoring (NO BRAND SPECIFIED) test strip     metFORMIN (GLUCOPHAGE-XR) 500 MG 24 hr tablet     tamsulosin (FLOMAX) 0.4 MG capsule     No current facility-administered medications for this visit.          PHYSICAL EXAM:    BP (!) 136/96  Pulse 99  Ht 1.676 m (5' 6\")  Wt 99.8 kg (220 lb)  BMI 35.51 kg/m2    HEENT: Normocephalic and atraumatic   Cardiac: Not done  Back/Flank: Not done  CNS/PNS: Not done  Respiratory: Normal non-labored breathing  Abdomen: Soft nontender and nondistended  Peripheral Vascular: Not done  Mental Status: Not done    Penis: Not done  Scrotal Skin: Not done  Testicles: Not done  Epididymis: Not done  Digital Rectal Exam:     Cystoscopy: Not done    Imaging: None    Urinalysis: UA RESULTS:  Recent Labs   Lab Test  06/02/17   1625   COLOR  Straw   APPEARANCE  Clear   URINEGLC  >499*   URINEBILI  Negative   URINEKETONE  Negative   SG  1.015   UBLD  Small*   URINEPH  5.0   PROTEIN  Negative   NITRITE  Negative   LEUKEST  Negative   RBCU  0   WBCU  1           Post Void Residual:     Other labs: None today      IMPRESSION:  41 y/o M s/p ESWL for distal ureteral calculus with residual calcification in the distal ureter on KUB     PLAN:  Patient was offered but declined stone risk assessment including 24 hr urine   Repeat KUB in one month   Follow up in one month with " Pedro     Total Time: 15 minutes                                      Total in Consultation: greater than 50%       Again, thank you for allowing me to participate in the care of your patient.      Sincerely,    Guadalupe Thorpe MD

## 2017-07-21 NOTE — NURSING NOTE
Chief Complaint   Patient presents with     RECHECK     kidney stone with a X ray before        Sharon Bernal MA

## 2017-07-21 NOTE — MR AVS SNAPSHOT
After Visit Summary   7/21/2017    Jose Luis Rae    MRN: [id number]           Patient Information     Date Of Birth          1975        Visit Information        Provider Department      7/21/2017 2:45 PM Guadalupe Thorpe MD [facility] Urology and [facility] for Prostate and Urologic Cancers        Today's Diagnoses     Calculus of ureter    -  1      Care Instructions    Please get KUB and follow up with  after     It was a pleasure meeting with you today.  Thank you for allowing me and my team the privilege of caring for you today.  YOU are the reason we are here, and I truly hope we provided you with the excellent service you deserve.  Please let us know if there is anything else we can do for you so that we can be sure you are leaving completely satisfied with your care experience.                  Follow-ups after your visit        Your next 10 appointments already scheduled     Sep 08, 2017  3:30 PM CDT   XR KUB with UCXR1   [facility] Xray ([facility])    [address] [place] [address]  1st [facility] [id number] [phone]           Please bring a list of your current medicines to your exam. (Include vitamins, minerals and over-thecounter medicines.) Leave your valuables at home.  Tell your doctor if there is a chance you may be pregnant.  You do not need to do anything special for this exam.            Sep 08, 2017  4:00 PM CDT   (Arrive by 3:45 PM)   Return Visit with Ludwig Vasquez MD   [facility]rology and [facility] for Prostate and Urologic Cancers ([facility] [place])    [address] [place] [address]  4th Floor  [facility] [id number] [phone]            Sep 11, 2017  8:00 AM CDT   Bolivar Kramer with LOUIE Salcedo CNP   [facility] ([facility])    [address] [address] [place] [id number] [phone]            Dec 11, 2017  7:50 AM CST   MyChart Long with  "LOUIE Salcedo CNP   [facility] ([facility])    [address] [address] [place] [address] [phone]              Future tests that were ordered for you today     Open Future Orders        Priority Expected Expires Ordered    X-Ray KUB Routine 7/21/2017 7/21/2018 7/21/2017            Who to contact     Please call your clinic at [phone] to:    Ask questions about your health    Make or cancel appointments    Discuss your medicines    Learn about your test results    Speak to your doctor   If you have compliments or concerns about an experience at your clinic, or if you wish to file a complaint, please contact [facility] Patient Relations at [phone] or email us at [email or web address]         Additional Information About Your Visit        The Art Commission[employer] Information     Brightstart gives you secure access to your electronic health record. If you see a primary care provider, you can also send messages to your care team and make appointments. If you have questions, please call your primary care clinic.  If you do not have a primary care provider, please call [phone] and they will assist you.      CytoPherx is an electronic gateway that provides easy, online access to your medical records. With CytoPherx, you can request a clinic appointment, read your test results, renew a prescription or communicate with your care team.     To access your existing account, please contact your [facility] or call [phone] for assistance.        Care EveryWhere ID     This is your Care EveryWhere ID. This could be used by other organizations to access your [place] medical records  [id number]        Your Vitals Were     Pulse Height BMI (Body Mass Index)             99 1.676 m (5' 6\") 35.51 kg/m2          Blood Pressure from Last 3 Encounters:   07/21/17 (!) 136/96   06/20/17 (!) 154/99   06/13/17 130/75    " Weight from Last 3 Encounters:   07/21/17 99.8 kg (220 lb)   06/20/17 99.3 kg (219 lb)   06/13/17 99.7 kg (219 lb 14.4 oz)                 Today's Medication Changes          These changes are accurate as of: 7/21/17  6:30 PM.  If you have any questions, ask your nurse or doctor.               These medicines have changed or have updated prescriptions.        Dose/Directions    * tamsulosin 0.4 MG capsule   Commonly known as:  FLOMAX   This may have changed:  Another medication with the same name was added. Make sure you understand how and when to take each.   Used for:  Calculus of kidney   Changed by:  Ludwig Vasquez MD        Dose:  0.4 mg   Take 1 capsule (0.4 mg) by mouth daily   Quantity:  30 capsule   Refills:  0       * tamsulosin 0.4 MG capsule   Commonly known as:  FLOMAX   This may have changed:  You were already taking a medication with the same name, and this prescription was added. Make sure you understand how and when to take each.   Used for:  Calculus of ureter   Changed by:  Guadalupe Thorpe MD        Dose:  0.4 mg   Take 1 capsule (0.4 mg) by mouth daily   Quantity:  30 capsule   Refills:  0       * Notice:  This list has 2 medication(s) that are the same as other medications prescribed for you. Read the directions carefully, and ask your doctor or other care provider to review them with you.         Where to get your medicines      These medications were sent to [facility] [address] [place] [address] [place] [id number] [facility] [address]     Phone:  [phone]     tamsulosin 0.4 MG capsule                Primary Care Provider    None Specified       No primary provider on file.        Equal Access to Services     KINDRA SHANNON : Golden Beltrán, simeon napier, laurel finney. So [facility] [phone].    ATENCIÓN: Si habla español, tiene a castillo disposición servicios gratuitos de asistencia  lingüística. Pamela al [phone].    We comply with applicable federal civil rights laws and [place] laws. We do not discriminate on the basis of race, color, national origin, age, disability sex, sexual orientation or gender identity.            Thank you!     Thank you for choosing [facility] UROLOGY AND [facility] FOR PROSTATE AND UROLOGIC CANCERS  for your care. Our goal is always to provide you with excellent care. Hearing back from our patients is one way we can continue to improve our services. Please take a few minutes to complete the written survey that you may receive in the mail after your visit with us. Thank you!             Your Updated Medication List - Protect others around you: Learn how to safely use, store and throw away your medicines at www.disposemymeds.org.          This list is accurate as of: 7/21/17  6:30 PM.  Always use your most recent med list.                   Brand Name Dispense Instructions for use Diagnosis    ADVIL PO      Take 600 mg by mouth        blood glucose monitoring test strip    no brand specified    50 strip    Needs One Touch Verio test strip. Use to test blood sugars 1-2 times daily or as directed    Diabetes mellitus, type 2 (H)       ketorolac 10 MG tablet    TORADOL     Take 10 mg by mouth every 6 hours as needed for moderate pain        metFORMIN 500 MG 24 hr tablet    GLUCOPHAGE-XR    60 tablet    Take 1 tablet (500 mg) by mouth 2 times daily (with meals)    Type 2 diabetes mellitus with hyperglycemia, without long-term current use of insulin (H)       * tamsulosin 0.4 MG capsule    FLOMAX    30 capsule    Take 1 capsule (0.4 mg) by mouth daily    Calculus of kidney       * tamsulosin 0.4 MG capsule    FLOMAX    30 capsule    Take 1 capsule (0.4 mg) by mouth daily    Calculus of ureter       traMADol 50 MG tablet    ULTRAM    30 tablet    Take 1 tablet (50 mg) by mouth every 6 hours as needed for pain maximum 8 tablet(s) per day    Calculus of ureter       * Notice:   This list has 2 medication(s) that are the same as other medications prescribed for you. Read the directions carefully, and ask your doctor or other care provider to review them with you.

## 2017-07-21 NOTE — PATIENT INSTRUCTIONS
Please get KUB and follow up with  after     It was a pleasure meeting with you today.  Thank you for allowing me and my team the privilege of caring for you today.  YOU are the reason we are here, and I truly hope we provided you with the excellent service you deserve.  Please let us know if there is anything else we can do for you so that we can be sure you are leaving completely satisfied with your care experience.

## 2017-09-08 ENCOUNTER — OFFICE VISIT (OUTPATIENT)
Dept: UROLOGY | Facility: CLINIC | Age: 42
End: 2017-09-08

## 2017-09-08 ENCOUNTER — PRE VISIT (OUTPATIENT)
Dept: UROLOGY | Facility: CLINIC | Age: 42
End: 2017-09-08

## 2017-09-08 VITALS
SYSTOLIC BLOOD PRESSURE: 138 MMHG | HEART RATE: 104 BPM | BODY MASS INDEX: 37.49 KG/M2 | WEIGHT: 233.3 LBS | HEIGHT: 66 IN | DIASTOLIC BLOOD PRESSURE: 91 MMHG

## 2017-09-08 DIAGNOSIS — N20.0 KIDNEY STONE: Primary | ICD-10-CM

## 2017-09-08 ASSESSMENT — PAIN SCALES - GENERAL: PAINLEVEL: NO PAIN (0)

## 2017-09-08 NOTE — MR AVS SNAPSHOT
After Visit Summary   9/8/2017    Jose Luis Rae    MRN: [id number]           Patient Information     Date Of Birth          1975        Visit Information        Provider Department      9/8/2017 4:00 PM Ludwig Vasquze MD [facility] Urology and [facility] for Prostate and Urologic Cancers        Today's Diagnoses     Kidney stone    -  1      Care Instructions    Dr. Vasquez will call you to discuss imaging results next week.    It was a pleasure meeting with you today.  Thank you for allowing me and my team the privilege of caring for you today.  YOU are the reason we are here, and I truly hope we provided you with the excellent service you deserve.  Please let us know if there is anything else we can do for you so that we can be sure you are leaving completely satisfied with your care experience.                  Follow-ups after your visit        Your next 10 appointments already scheduled     Dec 11, 2017  7:50 AM CST   MyChart Long with LOUIE Salcedo CNP   [facility] ([facility])    [address] [place] [address] [phone]              Who to contact     Please call your clinic at [phone] to:    Ask questions about your health    Make or cancel appointments    Discuss your medicines    Learn about your test results    Speak to your doctor   If you have compliments or concerns about an experience at your clinic, or if you wish to file a complaint, please contact [facility] Patient Relations at [phone] or email us at [email or web address]         Additional Information About Your Visit        Bolivar Information     Bolivar gives you secure access to your electronic health record. If you see a primary care provider, you can also send messages to your care team and make appointments. If you have questions, please call your primary care clinic.  If you do not have a primary care  "provider, please call [phone] and they will assist you.      AeternusLED is an electronic gateway that provides easy, online access to your medical records. With AeternusLED, you can request a clinic appointment, read your test results, renew a prescription or communicate with your care team.     To access your existing account, please contact your [facility] or call [phone] for assistance.        Care EveryWhere ID     This is your Care EveryWhere ID. This could be used by other organizations to access your [place] medical records  [id number]        Your Vitals Were     Pulse Height BMI (Body Mass Index)             104 1.676 m (5' 6\") 37.66 kg/m2          Blood Pressure from Last 3 Encounters:   09/11/17 120/75   09/08/17 (!) 138/91   07/21/17 (!) 136/96    Weight from Last 3 Encounters:   09/11/17 107 kg (235 lb 14.4 oz)   09/08/17 105.8 kg (233 lb 4.8 oz)   07/21/17 99.8 kg (220 lb)              Today, you had the following     No orders found for display       Primary Care Provider    None Specified       No primary provider on file.        Equal Access to Services     [facility] : Hadii clifton Beltrán, wacaryda ludeb, qaybta kaalmada adefelipe, laurel dominguez . So [facility] [place] [facility] [phone].    ATENCIÓN: Si habla español, tiene a castillo disposición servicios gratuitos de asistencia lingüística. Llame al [phone].    We comply with applicable federal civil rights laws and [place] laws. We do not discriminate on the basis of race, color, national origin, age, disability sex, sexual orientation or gender identity.            Thank you!     Thank you for choosing [facility] UROLOGY AND [facility] FOR PROSTATE AND UROLOGIC CANCERS  for your care. Our goal is always to provide you with excellent care. Hearing back from our patients is one way we can continue to improve our services. Please take a few minutes to complete the written survey that you may " receive in the mail after your visit with us. Thank you!             Your Updated Medication List - Protect others around you: Learn how to safely use, store and throw away your medicines at www.disposemymeds.org.          This list is accurate as of: 9/8/17 11:59 PM.  Always use your most recent med list.                   Brand Name Dispense Instructions for use Diagnosis    blood glucose monitoring test strip    no brand specified    50 strip    Needs One Touch Verio test strip. Use to test blood sugars 1-2 times daily or as directed    Diabetes mellitus, type 2 (H)

## 2017-09-08 NOTE — LETTER
9/8/2017       RE: Jose Luis Rae  [address] [address] [facility] [address]     Dear Colleague,    Thank you for referring your patient, Jose Luis Rae, to the [facility] UROLOGY AND INST FOR PROSTATE AND UROLOGIC CANCERS at [facility]. Please see a copy of my visit note below.    ASSESSMENT AND PLAN  Nephrolithiasis s/p extracorporeal shockwave lithotripsy on 6/20/17.    He had a kub done today. Will plan to do a phone f/u next week to review results.    _________________________________________________________________________    CHIEF COMPLAINT  It was my pleasure to see Jose Luis Rae who is a 42 year old male who is here for follow-up for kidney stones.    HPI  Mr. Rae presents with a history of nephrolithiasis treated with shockwave lithotripsy on 6/20/17.     He has been doing well. He does report some urinary symptoms including frequency and incomplete voiding.       Patient Active Problem List    Diagnosis Date Noted     Type 2 diabetes mellitus with hyperglycemia, without long-term current use of insulin (H) 06/14/2017     Priority: Medium     Abnormal liver function tests 10/12/2012     Priority: Medium     Hypertriglyceridemia 10/12/2012     Priority: Medium     Overweight 10/12/2012     Priority: Medium     Past Medical History:   Diagnosis Date     Abnormal liver function tests 10/12/2012     Calculus of kidney      Diabetes (H)      Hypertriglyceridemia 10/12/2012     Overweight 10/12/2012     Past Surgical History:   Procedure Laterality Date     ADENOIDECTOMY       EXTRACORPOREAL SHOCK WAVE LITHOTRIPSY (ESWL) Left 6/20/2017    Procedure: EXTRACORPOREAL SHOCK WAVE LITHOTRIPSY (ESWL);  Left Extracorporal Shockwave Lithotripsy;  Surgeon: Ludwig Vasquez MD;  Location: [facility] OR     Current Outpatient Prescriptions   Medication Sig Dispense Refill     tamsulosin (FLOMAX) 0.4 MG capsule Take 1 capsule (0.4 mg) by mouth daily 30 capsule 0     tamsulosin  "(FLOMAX) 0.4 MG capsule Take 1 capsule (0.4 mg) by mouth daily 30 capsule 0     ketorolac (TORADOL) 10 MG tablet Take 10 mg by mouth every 6 hours as needed for moderate pain       traMADol (ULTRAM) 50 MG tablet Take 1 tablet (50 mg) by mouth every 6 hours as needed for pain maximum 8 tablet(s) per day 30 tablet 0     Ibuprofen (ADVIL PO) Take 600 mg by mouth       blood glucose monitoring (NO BRAND SPECIFIED) test strip Needs One Touch Verio test strip. Use to test blood sugars 1-2 times daily or as directed 50 strip 3     metFORMIN (GLUCOPHAGE-XR) 500 MG 24 hr tablet Take 1 tablet (500 mg) by mouth 2 times daily (with meals) 60 tablet 3      SOCIAL HISTORY: He  reports that he has never smoked. He has never used smokeless tobacco. He reports that he drinks alcohol. He reports that he does not use illicit drugs.    PHYSICAL EXAM  BP (!) 138/91  Pulse 104  Ht 1.676 m (5' 6\")  Wt 105.8 kg (233 lb 4.8 oz)  BMI 37.66 kg/m2  Constitutional: Alert, no acute distress  Psychiatric: Normal mood and affect  Gastrointestinal: Abdomen soft, non-tender. No masses, organomegaly.       Lab Results   Component Value Date    APOORVA 9.1 06/13/2017    APOORVA 8.9 06/02/2017       Recent Labs   Lab Test  06/13/17   1621  06/02/17   1539   NA  142  138   POTASSIUM  3.6  4.0   CHLORIDE  107  105   CO2  26  24   ANIONGAP  9  10   GLC  169*  309*   BUN  12  14   CR  0.78  0.77   GFRESTIMATED  >90  Non  GFR Calc    >90  Non  GFR Calc     GFRESTBLACK  >90   GFR Calc    >90   GFR Calc     APOORVA  9.1  8.9        UA RESULTS:   Recent Labs   Lab Test  06/02/17   1625   SG  1.015   URINEPH  5.0   NITRITE  Negative   RBCU  0   WBCU  1     Scribe Disclosure:   I, Levon Melvin, am serving as a scribe; to document services personally performed by Ambrose Vasquez MD -based on data collection and the provider's statements to me.     Provider Disclosure:  I agree with above History, Review " of Systems, Physical exam and Plan.  I have reviewed the content of the documentation and have edited it as needed. I have personally performed the services documented here and the documentation accurately represents those services and the decisions I have made.      Electronically signed by:  Ambrose Vasquez MD    CC  Patient Care Team:  Ludwig Vasquez MD as MD (Urology)  Nina Elaine, RN as Registered Nurse  SELF, REFERRED    Copy to patient  ALFREDA APPLE  [address] [facility] [address]

## 2017-09-08 NOTE — NURSING NOTE
"Chief Complaint   Patient presents with     RECHECK     Kidney stone follow up       Initial Ht 1.676 m (5' 6\")  Wt 105.8 kg (233 lb 4.8 oz)  BMI 37.66 kg/m2 Estimated body mass index is 37.66 kg/(m^2) as calculated from the following:    Height as of this encounter: 1.676 m (5' 6\").    Weight as of this encounter: 105.8 kg (233 lb 4.8 oz).  Medication Reconciliation: complete     KHALIDA Perez    "

## 2017-09-08 NOTE — PROGRESS NOTES
ASSESSMENT AND PLAN  Nephrolithiasis s/p extracorporeal shockwave lithotripsy on 6/20/17.    He had a kub done today. Will plan to do a phone f/u next week to review results.    _________________________________________________________________________    CHIEF COMPLAINT  It was my pleasure to see Jose Luis Rae who is a 42 year old male who is here for follow-up for kidney stones.    HPI  Mr. Rae presents with a history of nephrolithiasis treated with shockwave lithotripsy on 6/20/17.     He has been doing well. He does report some urinary symptoms including frequency and incomplete voiding.       Patient Active Problem List    Diagnosis Date Noted     Type 2 diabetes mellitus with hyperglycemia, without long-term current use of insulin (H) 06/14/2017     Priority: Medium     Abnormal liver function tests 10/12/2012     Priority: Medium     Hypertriglyceridemia 10/12/2012     Priority: Medium     Overweight 10/12/2012     Priority: Medium     Past Medical History:   Diagnosis Date     Abnormal liver function tests 10/12/2012     Calculus of kidney      Diabetes (H)      Hypertriglyceridemia 10/12/2012     Overweight 10/12/2012     Past Surgical History:   Procedure Laterality Date     ADENOIDECTOMY       EXTRACORPOREAL SHOCK WAVE LITHOTRIPSY (ESWL) Left 6/20/2017    Procedure: EXTRACORPOREAL SHOCK WAVE LITHOTRIPSY (ESWL);  Left Extracorporal Shockwave Lithotripsy;  Surgeon: Ludwig Vasquez MD;  Location:  OR     Current Outpatient Prescriptions   Medication Sig Dispense Refill     tamsulosin (FLOMAX) 0.4 MG capsule Take 1 capsule (0.4 mg) by mouth daily 30 capsule 0     tamsulosin (FLOMAX) 0.4 MG capsule Take 1 capsule (0.4 mg) by mouth daily 30 capsule 0     ketorolac (TORADOL) 10 MG tablet Take 10 mg by mouth every 6 hours as needed for moderate pain       traMADol (ULTRAM) 50 MG tablet Take 1 tablet (50 mg) by mouth every 6 hours as needed for pain maximum 8 tablet(s) per day 30 tablet 0      "Ibuprofen (ADVIL PO) Take 600 mg by mouth       blood glucose monitoring (NO BRAND SPECIFIED) test strip Needs One Touch Verio test strip. Use to test blood sugars 1-2 times daily or as directed 50 strip 3     metFORMIN (GLUCOPHAGE-XR) 500 MG 24 hr tablet Take 1 tablet (500 mg) by mouth 2 times daily (with meals) 60 tablet 3      SOCIAL HISTORY: He  reports that he has never smoked. He has never used smokeless tobacco. He reports that he drinks alcohol. He reports that he does not use illicit drugs.    PHYSICAL EXAM  BP (!) 138/91  Pulse 104  Ht 1.676 m (5' 6\")  Wt 105.8 kg (233 lb 4.8 oz)  BMI 37.66 kg/m2  Constitutional: Alert, no acute distress  Psychiatric: Normal mood and affect  Gastrointestinal: Abdomen soft, non-tender. No masses, organomegaly.       Lab Results   Component Value Date    APOORVA 9.1 06/13/2017    APOORVA 8.9 06/02/2017       Recent Labs   Lab Test  06/13/17   1621  06/02/17   1539   NA  142  138   POTASSIUM  3.6  4.0   CHLORIDE  107  105   CO2  26  24   ANIONGAP  9  10   GLC  169*  309*   BUN  12  14   CR  0.78  0.77   GFRESTIMATED  >90  Non  GFR Calc    >90  Non  GFR Calc     GFRESTBLACK  >90   GFR Calc    >90   GFR Calc     APOORVA  9.1  8.9        UA RESULTS:   Recent Labs   Lab Test  06/02/17   1625   SG  1.015   URINEPH  5.0   NITRITE  Negative   RBCU  0   WBCU  1     Scribe Disclosure:   I, Levon Melvin, am serving as a scribe; to document services personally performed by Ambrose Vasquez MD -based on data collection and the provider's statements to me.     Provider Disclosure:  I agree with above History, Review of Systems, Physical exam and Plan.  I have reviewed the content of the documentation and have edited it as needed. I have personally performed the services documented here and the documentation accurately represents those services and the decisions I have made.      Electronically signed by:  Ambrose Vasquez, " MD    CC  Patient Care Team:  Ludwig Vasquez MD as MD (Urology)  Nina Elaine, RN as Registered Nurse  SELF, REFERRED    Copy to patient  ALFREDA APPLE  [address] [address] [facility] [address]

## 2017-09-08 NOTE — PATIENT INSTRUCTIONS
Dr. Vasquez will call you to discuss imaging results next week.    It was a pleasure meeting with you today.  Thank you for allowing me and my team the privilege of caring for you today.  YOU are the reason we are here, and I truly hope we provided you with the excellent service you deserve.  Please let us know if there is anything else we can do for you so that we can be sure you are leaving completely satisfied with your care experience.

## 2017-09-11 ENCOUNTER — OFFICE VISIT (OUTPATIENT)
Dept: PEDIATRICS | Facility: CLINIC | Age: 42
End: 2017-09-11
Payer: COMMERCIAL

## 2017-09-11 VITALS
DIASTOLIC BLOOD PRESSURE: 75 MMHG | HEART RATE: 75 BPM | TEMPERATURE: 97.2 F | OXYGEN SATURATION: 96 % | BODY MASS INDEX: 38.08 KG/M2 | SYSTOLIC BLOOD PRESSURE: 120 MMHG | WEIGHT: 235.9 LBS

## 2017-09-11 DIAGNOSIS — R79.89 ABNORMAL LIVER FUNCTION TESTS: ICD-10-CM

## 2017-09-11 DIAGNOSIS — E11.65 TYPE 2 DIABETES MELLITUS WITH HYPERGLYCEMIA, WITHOUT LONG-TERM CURRENT USE OF INSULIN (H): Primary | ICD-10-CM

## 2017-09-11 DIAGNOSIS — E78.1 HYPERTRIGLYCERIDEMIA: ICD-10-CM

## 2017-09-11 LAB
ALBUMIN SERPL-MCNC: 4 G/DL (ref 3.4–5)
ALP SERPL-CCNC: 68 U/L (ref 40–150)
ALT SERPL W P-5'-P-CCNC: 110 U/L (ref 0–70)
ANION GAP SERPL CALCULATED.3IONS-SCNC: 6 MMOL/L (ref 3–14)
AST SERPL W P-5'-P-CCNC: 46 U/L (ref 0–45)
BILIRUB SERPL-MCNC: 1 MG/DL (ref 0.2–1.3)
BUN SERPL-MCNC: 15 MG/DL (ref 7–30)
CALCIUM SERPL-MCNC: 8.7 MG/DL (ref 8.5–10.1)
CHLORIDE SERPL-SCNC: 107 MMOL/L (ref 94–109)
CHOLEST SERPL-MCNC: 213 MG/DL
CO2 SERPL-SCNC: 26 MMOL/L (ref 20–32)
CREAT SERPL-MCNC: 0.8 MG/DL (ref 0.66–1.25)
CREAT UR-MCNC: 73 MG/DL
GFR SERPL CREATININE-BSD FRML MDRD: >90 ML/MIN/1.7M2
GLUCOSE SERPL-MCNC: 127 MG/DL (ref 70–99)
HBA1C MFR BLD: 6.6 % (ref 4.3–6)
HDLC SERPL-MCNC: 34 MG/DL
LDLC SERPL CALC-MCNC: 121 MG/DL
MICROALBUMIN UR-MCNC: 28 MG/L
MICROALBUMIN/CREAT UR: 37.47 MG/G CR (ref 0–17)
NONHDLC SERPL-MCNC: 179 MG/DL
POTASSIUM SERPL-SCNC: 4 MMOL/L (ref 3.4–5.3)
PROT SERPL-MCNC: 7.9 G/DL (ref 6.8–8.8)
SODIUM SERPL-SCNC: 139 MMOL/L (ref 133–144)
TRIGL SERPL-MCNC: 290 MG/DL

## 2017-09-11 PROCEDURE — 83036 HEMOGLOBIN GLYCOSYLATED A1C: CPT | Performed by: NURSE PRACTITIONER

## 2017-09-11 PROCEDURE — 80061 LIPID PANEL: CPT | Performed by: NURSE PRACTITIONER

## 2017-09-11 PROCEDURE — 99214 OFFICE O/P EST MOD 30 MIN: CPT | Performed by: NURSE PRACTITIONER

## 2017-09-11 PROCEDURE — 80053 COMPREHEN METABOLIC PANEL: CPT | Performed by: NURSE PRACTITIONER

## 2017-09-11 PROCEDURE — 82043 UR ALBUMIN QUANTITATIVE: CPT | Performed by: NURSE PRACTITIONER

## 2017-09-11 PROCEDURE — 36415 COLL VENOUS BLD VENIPUNCTURE: CPT | Performed by: NURSE PRACTITIONER

## 2017-09-11 RX ORDER — METFORMIN HCL 500 MG
500 TABLET, EXTENDED RELEASE 24 HR ORAL 2 TIMES DAILY WITH MEALS
Qty: 180 TABLET | Refills: 1 | Status: SHIPPED | OUTPATIENT
Start: 2017-09-11 | End: 2017-12-12

## 2017-09-11 NOTE — MR AVS SNAPSHOT
After Visit Summary   9/11/2017    Jose Luis Rae    MRN: [id number]           Patient Information     Date Of Birth          1975        Visit Information        Provider Department      9/11/2017 8:00 AM Melissa Liu APRN CNP [facility]        Today's Diagnoses     Hypertriglyceridemia    -  1    Type 2 diabetes mellitus with hyperglycemia, without long-term current use of insulin (H)        Abnormal liver function tests          Care Instructions    It was a pleasure seeing you today at the [facility]. Thank you for allowing us to care for you today. We truly hope we provided you with the excellent service you deserve. Please let us know if there is anything else we can do for you so we can be sure you are leaving completley satisfied with your care experience.       General information about your clinic   Clinic Hours Lab Hours (Appointments are required)   Mon-Thurs: 7:30 AM - 7 PM Mon-Thurs: 7:30 AM - 7 PM   Fri: 7:30 AM - 5 PM Fri: 7:30 AM - 5 PM        After Hours Nurse Advise & Appts:  [facility] Advisors: [phone]  [facility] On Call: to make appointments anytime: [phone] On Call Physician: call [phone] and answering service will page the on call physician.        For urgent appointments, please call [phone] and ask for the triage nurse or your care team clinic nurse.  How to contact my care team:  [place]: [email or web address]   Phone: [phone]   Fax: [phone]       [facility]:   Phone: [phone]  Hours: 8:00 AM - 6:00 PM  Medication Refills:  Call your pharmacy and they will forward the refill to us. Please allow 3 business days for your refills to be completed.       Normal or non-critical lab and imaging results will be communicated to you by MyChart, letter or phone within 7 days.  If you do not hear from us within 10 days, please call the clinic. If you have a critical or  abnormal lab result, we will notify you by phone as soon as possible.       We now have PWIC (Pediatric Walk in Care)  Monday-Friday from 7:30-4. Simply walk in and be seen for your urgent needs like cough, fever, rash, diarrhea or vomiting, pink eye, UTI. No appointments needed. Ask one of the team for more information      -Your Care Team:    Dr. Jeff Doyle - Internal Medicine/Pediatrics   Dr. Jacqueline Song - Family Medicine  Dr. Elizabeth Hayes - Pediatrics  Melissa Liu CNP - Family Practice Nurse Practitioner  Dr. Sharmin Delgado - Pediatrics       PLAN:   1.   Symptomatic therapy suggested: Continue current medications  2.  Orders Placed This Encounter   Medications     metFORMIN (GLUCOPHAGE-XR) 500 MG 24 hr tablet     Sig: Take 1 tablet (500 mg) by mouth 2 times daily (with meals)     Dispense:  180 tablet     Refill:  1     Orders Placed This Encounter   Procedures     Comprehensive metabolic panel     Hemoglobin A1c     Albumin Random Urine Quantitative with Creat Ratio     Lipid panel reflex to direct LDL     3. Patient needs to follow up in if no improvement,or sooner if worsening of symptoms or other symptoms develop.  FUTURE LABS:       - Schedule fasting labs in 3 months  Follow up in 6 months.      A1c(%) Mean blood sugar (mg/dl)  6 135  7 170  8 205  9 240  10 275  11 310  12 345                Follow-ups after your visit        Your next 10 appointments already scheduled     Dec 11, 2017  7:50 AM CST   MyChart Long with LOUIE Salcedo CNP   [facility]    [address] [place] [id number] [phone]              Who to contact     If you have questions or need follow up information about today's clinic visit or your schedule please contact [facility] directly at [phone].  Normal or non-critical lab and imaging results will be communicated to you by MyChart, letter or phone within 4 business days after  the clinic has received the results. If you do not hear from us within 7 days, please contact the clinic through eMeter or phone. If you have a critical or abnormal lab result, we will notify you by phone as soon as possible.  Submit refill requests through eMeter or call your pharmacy and they will forward the refill request to us. Please allow 3 business days for your refill to be completed.          Additional Information About Your Visit        [email or web address] Information     eMeter gives you secure access to your electronic health record. If you see a primary care provider, you can also send messages to your care team and make appointments. If you have questions, please call your primary care clinic.  If you do not have a primary care provider, please call [phone] and they will assist you.      eMeter is an electronic gateway that provides easy, online access to your medical records. With eMeter, you can request a clinic appointment, read your test results, renew a prescription or communicate with your care team.     To access your existing account, please contact your [facility] or call [phone] for assistance.        Care EveryWhere ID     This is your Care EveryWhere ID. This could be used by other organizations to access your [facility] medical records  [id number]        Your Vitals Were     Pulse Temperature Pulse Oximetry BMI (Body Mass Index)          75 97.2  F (36.2  C) (Temporal) 96% 38.08 kg/m2         Blood Pressure from Last 3 Encounters:   09/11/17 120/75   09/08/17 (!) 138/91   07/21/17 (!) 136/96    Weight from Last 3 Encounters:   09/11/17 235 lb 14.4 oz (107 kg)   09/08/17 233 lb 4.8 oz (105.8 kg)   07/21/17 220 lb (99.8 kg)              We Performed the Following     Albumin Random Urine Quantitative with Creat Ratio     Comprehensive metabolic panel     Hemoglobin A1c     Lipid panel reflex to direct LDL          Where to get your medicines      These  medications were sent to [facility] [address] [place], [place] - [address] [facility]  [address] [facility] [address]     Phone:  [phone]     metFORMIN 500 MG 24 hr tablet          Primary Care Provider    None Specified       No primary provider on file.        Equal Access to Services     SAMANTHA SHANNON : Hadii clifton villanueva hadannao Soomaali, waaxda luqadaha, qaybta kaalmada adeegyada, laurel pantojamadisyntona prak. So [facility] [phone].    ATENCIÓN: Si habla español, tiene a castillo disposición servicios gratuitos de asistencia lingüística. Llame al [phone].    We comply with applicable federal civil rights laws and [place] laws. We do not discriminate on the basis of race, color, national origin, age, disability sex, sexual orientation or gender identity.            Thank you!     Thank you for choosing [facility]  for your care. Our goal is always to provide you with excellent care. Hearing back from our patients is one way we can continue to improve our services. Please take a few minutes to complete the written survey that you may receive in the mail after your visit with us. Thank you!             Your Updated Medication List - Protect others around you: Learn how to safely use, store and throw away your medicines at www.disposemymeds.org.          This list is accurate as of: 9/11/17  8:29 AM.  Always use your most recent med list.                   Brand Name Dispense Instructions for use Diagnosis    blood glucose monitoring test strip    no brand specified    50 strip    Needs One Touch Verio test strip. Use to test blood sugars 1-2 times daily or as directed    Diabetes mellitus, type 2 (H)       metFORMIN 500 MG 24 hr tablet    GLUCOPHAGE-XR    180 tablet    Take 1 tablet (500 mg) by mouth 2 times daily (with meals)    Type 2 diabetes mellitus with hyperglycemia, without long-term current use of insulin (H)

## 2017-09-11 NOTE — LETTER
September 27, 2017      Jose Luis Rae  [id number] [facility] [address]        To Whom It May Concern,    Jose Luis Rae had elevated liver function tests noted on labs drawn on 6/13/2017.  It was determined as this needed to be addressed and first assessment should be to assess for potential exposure to hepatitis. Thus is was medically important these labs be done to evaluate his liver and potential causes for the levels to be elevated.             Sincerely,        LOUIE Salcedo CNP
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
## 2017-09-11 NOTE — PATIENT INSTRUCTIONS
It was a pleasure seeing you today at the [facility]. Thank you for allowing us to care for you today. We truly hope we provided you with the excellent service you deserve. Please let us know if there is anything else we can do for you so we can be sure you are leaving completley satisfied with your care experience.       General information about your clinic   Clinic Hours Lab Hours (Appointments are required)   Mon-Thurs: 7:30 AM - 7 PM Mon-Thurs: 7:30 AM - 7 PM   Fri: 7:30 AM - 5 PM Fri: 7:30 AM - 5 PM        After Hours Nurse Advise & Appts:  [facility] Nurse Advisors: [phone]  [place] On Call: to make appointments anytime: [phone] On Call Physician: call [phone] and answering service will page the on call physician.        For urgent appointments, please call [phone] and ask for the triage nurse or your care team clinic nurse.  How to contact my care team:  [place]: [email or web address]   Phone: [phone]   Fax: [phone]       [facility]:   Phone: [phone]  Hours: 8:00 AM - 6:00 PM  Medication Refills:  Call your pharmacy and they will forward the refill to us. Please allow 3 business days for your refills to be completed.       Normal or non-critical lab and imaging results will be communicated to you by MyChart, letter or phone within 7 days.  If you do not hear from us within 10 days, please call the clinic. If you have a critical or abnormal lab result, we will notify you by phone as soon as possible.       We now have PWIC (Pediatric Walk in Care)  Monday-Friday from 7:30-4. Simply walk in and be seen for your urgent needs like cough, fever, rash, diarrhea or vomiting, pink eye, UTI. No appointments needed. Ask one of the team for more information      -Your Care Team:    Dr. Jeff Doyle - Internal Medicine/Pediatrics   Dr. Jacqueline Song - Family Medicine  Dr. Elizabeth Hayes - Pediatrics  Melissa Liu CNP - Family Practice Nurse  Practitioner  Dr. Sharmin Delgado - Pediatrics       PLAN:   1.   Symptomatic therapy suggested: Continue current medications  2.  Orders Placed This Encounter   Medications     metFORMIN (GLUCOPHAGE-XR) 500 MG 24 hr tablet     Sig: Take 1 tablet (500 mg) by mouth 2 times daily (with meals)     Dispense:  180 tablet     Refill:  1     Orders Placed This Encounter   Procedures     Comprehensive metabolic panel     Hemoglobin A1c     Albumin Random Urine Quantitative with Creat Ratio     Lipid panel reflex to direct LDL     3. Patient needs to follow up in if no improvement,or sooner if worsening of symptoms or other symptoms develop.  FUTURE LABS:       - Schedule fasting labs in 3 months  Follow up in 6 months.      A1c(%) Mean blood sugar (mg/dl)  6 135  7 170  8 205  9 240  10 275  11 310  12 345

## 2017-09-12 NOTE — PROGRESS NOTES
Ekaterina Rae,    Attached are your test results.  -Liver and gallbladder tests (ALT,AST, Alk phos,bilirubin) are significantly elevated. ADVISE: further testing - they are improved but still elevated. Would get an ultrasound of your liver to check this out   (DX: elevated LFTs)  I will place order. Please call [phone] to schedule.    -Kidney function (GFR) is normal.  -Sodium is normal.  -Potassium is normal.    -LDL(bad) cholesterol level is elevated, HDL(good) cholesterol level is low and your triglycerides are elevated which can increase your heart disease risk.     A diet high in fat and simple carbohydrates, genetics and being overweight can contribute to this. ADVISE: Exercise, a low fat, low carbohydrate diet, weight control, and omega-3 fatty acids (fish oil) daily are helpful to improve this.  Rechecking your fasting cholesterol panel in 2 months is recommended (Lipid w/ LDL reflex, DX: hyperlipidemia)    -A1C (test of diabetes control the last 2-3 months) is at your goal. Please recheck your A1C test in 3 months.   -Microalbumin (urine protein) level is elevated. This is suggestive of early damage to your kidneys from high blood pressure and diabetes.  ADVISE: avoiding anti-inflamatory agents such as ibuprofen (Advil, Motrin) or naproxen (Aleve) as much as possible, keeping your blood pressure in a normal range, and rechecking your microalbumin level in 3 months (microalbumin; DX: Microalbuminuria)   Please contact us if you have any questions.    Melissa Liu, CNP

## 2017-09-24 PROBLEM — N20.0 KIDNEY STONE: Status: ACTIVE | Noted: 2017-09-24

## 2017-10-06 DIAGNOSIS — N20.0 KIDNEY STONE: Primary | ICD-10-CM

## 2017-10-10 ENCOUNTER — TELEPHONE (OUTPATIENT)
Dept: PEDIATRICS | Facility: CLINIC | Age: 42
End: 2017-10-10

## 2017-10-10 NOTE — TELEPHONE ENCOUNTER
Please fax letter related to billing for hepatitis panel   Our fax number here is    and just put attention Matti, I ll be sure to look for it.

## 2017-11-27 ENCOUNTER — RADIANT APPOINTMENT (OUTPATIENT)
Dept: ULTRASOUND IMAGING | Facility: CLINIC | Age: 42
End: 2017-11-27
Attending: NURSE PRACTITIONER
Payer: COMMERCIAL

## 2017-11-27 DIAGNOSIS — E11.65 TYPE 2 DIABETES MELLITUS WITH HYPERGLYCEMIA, WITHOUT LONG-TERM CURRENT USE OF INSULIN (H): Primary | ICD-10-CM

## 2017-11-27 DIAGNOSIS — R79.89 ABNORMAL LIVER FUNCTION TESTS: ICD-10-CM

## 2017-11-27 PROCEDURE — 76700 US EXAM ABDOM COMPLETE: CPT | Performed by: RADIOLOGY

## 2017-11-27 NOTE — PROGRESS NOTES
Ekaterina Rae,    Attached are your test results.  Ultrasound looks like changes consistent with fatty liver.   Working on diet and weight loss can help with this.   I sent you some information on this   Will recheck Liver tests when your next labs are due.    Please contact us if you have any questions.    Melissa Liu, CNP

## 2017-11-27 NOTE — PATIENT INSTRUCTIONS
Nonalcoholic Fatty Liver Disease (NAFLD)  Nonalcoholic fatty liver disease (NAFLD) is a common disease of the liver. It occurs when you have too much fat in the liver. If NAFLD is severe, it can cause liver damage that seems like the damage caused by drinking too much alcohol. But NAFLD is not caused by drinking alcohol. This sheet tells you more about NAFLD and how it can be managed.    How the liver works   The liver is an organ in the upper right side of the belly (abdomen). It has many important jobs. These include:    Breaking down (metabolizing) proteins, carbohydrates, and fats    Making a substance called bile that helps break down fats    Storing and releasing sugar (glucose) into the blood to give the body energy    Removing toxins from the blood    Helping with blood clotting  Understanding NAFLD  A healthy liver may contain some fat. But if too much fat builds up in the liver, this causes NAFLD. NAFLD can be mild, causing fatty liver. Or it can be more severe and show inflammation, as well as the fat. This can cause non-alcoholic steatohepatitis (LOOMIS).    Fatty liver. With fatty liver, the liver simply has more fat than normal. This extra fat usually does not harm the liver.    LOOMIS. With LOOMIS, the fatty liver becomes inflamed over time. LOOMIS is serious because it can lead to scarring of the liver (fibrosis). Over time, the scarring may lead to cirrhosis of the liver. This can eventually cause liver failure or liver cancer.  Causes and risk factors of NAFLD  Doctors don't know what causes NAFLD. But certain things make the problem more likely to happen. These include:    Obesity    Prediabetes or diabetes    High levels of fat found in the blood (cholesterol and triglycerides)    Being exposed to certain medicines   Symptoms of NAFLD  Most people with NAFLD have no symptoms. If symptoms do occur, they can include:    Tiredness    Weakness    Weight loss    Loss of appetite    Nausea and vomiting     Belly pain and cramping    Yellowing of the skin and eyes (jaundice), as well as dark urine, or light-colored stools    Swelling in the belly or legs  Diagnosing NAFLD  Your healthcare provider may think you have NAFLD if routine blood tests show high levels of liver enzymes. This may mean you have a liver problem. You may need one or more imaging tests, such as an ultrasound, CT, or MRI. You may need more blood tests to look for other causes of liver disease. You may also need a liver biopsy. During this test, a hollow needle is used to remove a tiny tissue sample from your liver. This tissue is then checked in a lab. This test can find signs of damage to liver tissue. It can also help figure out the cause of the damage and tell the difference between fatty liver and LOOMIS.  Treating NAFLD  Treatment for NAFLD varies for each person. The best early treatment is to treat any underlying conditions causing metabolic syndrome. This is the name for a group of conditions that includes:    High blood pressure    High levels of cholesterol and triglycerides    Being overweight or obese    Diabetes  Your healthcare provider will monitor your health and treat any symptoms or underlying health problems you have. Your provider will also work with you to control your risk factors. This will make liver damage less likely. In fact, treating those underlying conditions can often improve liver disease. You may need to take certain medicines, but no medicine will cure NAFLD. This is why treating the underlying conditions is most important. Your plan may include:    Losing extra weight    Getting regular exercise    Controlling diabetes and high cholesterol or triglyceride levels    Taking medicines and vitamins as prescribed by your provider    Quitting smoking    Not drinking alcohol    Eating a healthy and balanced diet  Living with NAFLD  If NAFLD is caught early, it can be managed with treatment. Your healthcare provider will  discuss further treatment choices with you as needed.  Be sure to ask your provider about recommended vaccines. These include vaccines for viruses that can cause liver disease.  Date Last Reviewed: 12/1/2016 2000-2017 [employer]. [address] [place] [address], [place] [address]. All rights reserved. This information is not intended as a substitute for professional medical care. Always follow your healthcare professional's instructions.

## 2017-12-12 ENCOUNTER — PRE VISIT (OUTPATIENT)
Dept: UROLOGY | Facility: CLINIC | Age: 42
End: 2017-12-12

## 2017-12-12 ENCOUNTER — OFFICE VISIT (OUTPATIENT)
Dept: PEDIATRICS | Facility: CLINIC | Age: 42
End: 2017-12-12
Payer: COMMERCIAL

## 2017-12-12 ENCOUNTER — RADIANT APPOINTMENT (OUTPATIENT)
Dept: GENERAL RADIOLOGY | Facility: CLINIC | Age: 42
End: 2017-12-12
Attending: UROLOGY
Payer: COMMERCIAL

## 2017-12-12 VITALS
BODY MASS INDEX: 38.88 KG/M2 | WEIGHT: 240.9 LBS | OXYGEN SATURATION: 97 % | TEMPERATURE: 97 F | DIASTOLIC BLOOD PRESSURE: 80 MMHG | HEART RATE: 86 BPM | SYSTOLIC BLOOD PRESSURE: 132 MMHG

## 2017-12-12 DIAGNOSIS — E11.65 TYPE 2 DIABETES MELLITUS WITH HYPERGLYCEMIA, WITHOUT LONG-TERM CURRENT USE OF INSULIN (H): Primary | ICD-10-CM

## 2017-12-12 DIAGNOSIS — E11.65 TYPE 2 DIABETES MELLITUS WITH HYPERGLYCEMIA, WITHOUT LONG-TERM CURRENT USE OF INSULIN (H): ICD-10-CM

## 2017-12-12 DIAGNOSIS — E66.3 OVERWEIGHT: ICD-10-CM

## 2017-12-12 DIAGNOSIS — R79.89 ELEVATED LFTS: ICD-10-CM

## 2017-12-12 DIAGNOSIS — N20.0 KIDNEY STONE: ICD-10-CM

## 2017-12-12 DIAGNOSIS — R79.89 ABNORMAL LIVER FUNCTION TESTS: ICD-10-CM

## 2017-12-12 DIAGNOSIS — R16.0 HEPATOMEGALY: ICD-10-CM

## 2017-12-12 LAB
ALBUMIN SERPL-MCNC: 4.2 G/DL (ref 3.4–5)
ALP SERPL-CCNC: 79 U/L (ref 40–150)
ALT SERPL W P-5'-P-CCNC: 133 U/L (ref 0–70)
ANION GAP SERPL CALCULATED.3IONS-SCNC: 9 MMOL/L (ref 3–14)
AST SERPL W P-5'-P-CCNC: 58 U/L (ref 0–45)
BILIRUB SERPL-MCNC: 1 MG/DL (ref 0.2–1.3)
BUN SERPL-MCNC: 22 MG/DL (ref 7–30)
CALCIUM SERPL-MCNC: 8.9 MG/DL (ref 8.5–10.1)
CHLORIDE SERPL-SCNC: 108 MMOL/L (ref 94–109)
CO2 SERPL-SCNC: 26 MMOL/L (ref 20–32)
CREAT SERPL-MCNC: 1.04 MG/DL (ref 0.66–1.25)
GFR SERPL CREATININE-BSD FRML MDRD: 79 ML/MIN/1.7M2
GLUCOSE SERPL-MCNC: 154 MG/DL (ref 70–99)
HBA1C MFR BLD: 6.9 % (ref 4.3–6)
POTASSIUM SERPL-SCNC: 3.8 MMOL/L (ref 3.4–5.3)
PROT SERPL-MCNC: 8.3 G/DL (ref 6.8–8.8)
SODIUM SERPL-SCNC: 143 MMOL/L (ref 133–144)

## 2017-12-12 PROCEDURE — 83036 HEMOGLOBIN GLYCOSYLATED A1C: CPT | Performed by: NURSE PRACTITIONER

## 2017-12-12 PROCEDURE — 36415 COLL VENOUS BLD VENIPUNCTURE: CPT | Performed by: NURSE PRACTITIONER

## 2017-12-12 PROCEDURE — 80053 COMPREHEN METABOLIC PANEL: CPT | Performed by: NURSE PRACTITIONER

## 2017-12-12 PROCEDURE — 74010 XR KUB: CPT | Mod: 52 | Performed by: RADIOLOGY

## 2017-12-12 PROCEDURE — 99214 OFFICE O/P EST MOD 30 MIN: CPT | Performed by: NURSE PRACTITIONER

## 2017-12-12 RX ORDER — METFORMIN HCL 500 MG
500 TABLET, EXTENDED RELEASE 24 HR ORAL 2 TIMES DAILY WITH MEALS
Qty: 180 TABLET | Refills: 1 | Status: SHIPPED | OUTPATIENT
Start: 2017-12-12 | End: 2018-06-12

## 2017-12-12 NOTE — NURSING NOTE
"Chief Complaint   Patient presents with     Lipids     discuss blood work       Initial /80 (BP Location: Right arm, Patient Position: Sitting, Cuff Size: Adult Regular)  Pulse 86  Temp 97  F (36.1  C) (Temporal)  Wt 240 lb 14.4 oz (109.3 kg)  SpO2 97%  BMI 38.88 kg/m2 Estimated body mass index is 38.88 kg/(m^2) as calculated from the following:    Height as of 9/8/17: 5' 6\" (1.676 m).    Weight as of this encounter: 240 lb 14.4 oz (109.3 kg).  Medication Reconciliation: complete      KHALIDA Kahn      "

## 2017-12-12 NOTE — MR AVS SNAPSHOT
After Visit Summary   12/12/2017    Jose Luis Rae    MRN: [id number]           Patient Information     Date Of Birth          1975        Visit Information        Provider Department      12/12/2017 7:50 AM Melissa Liu APRN CNP [facility]        Today's Diagnoses     Type 2 diabetes mellitus with hyperglycemia, without long-term current use of insulin (H)    -  1    Hepatomegaly        Elevated LFTs        Overweight          Care Instructions    PLAN:   1.   Symptomatic therapy suggested: Continue current medications.  2.  Orders Placed This Encounter   Medications     metFORMIN (GLUCOPHAGE-XR) 500 MG 24 hr tablet     Sig: Take 1 tablet (500 mg) by mouth 2 times daily (with meals)     Dispense:  180 tablet     Refill:  1     Orders Placed This Encounter   Procedures     NUTRITION REFERRAL     GASTROENTEROLOGY ADULT REF CONSULT ONLY       3. Patient needs to follow up in if no improvement,or sooner if worsening of symptoms or other symptoms develop.  CONSULTATION/REFERRAL to Gastroenterology and Dietician  Follow up in 6 months.      It was a pleasure seeing you today at the [facility]. Thank you for allowing us to care for you today. We truly hope we provided you with the excellent service you deserve. Please let us know if there is anything else we can do for you so we can be sure you are leaving completley satisfied with your care experience.       General information about your clinic   Clinic Hours Lab Hours (Appointments are required)   Mon-Thurs: 7:30 AM - 7 PM Mon-Thurs: 7:30 AM - 7 PM   Fri: 7:30 AM - 5 PM Fri: 7:30 AM - 5 PM        After Hours Nurse Advise & Appts:  [facility]: [phone]  [facility] On Call: to make appointments anytime: [phone] On Call Physician: call [phone] and answering service will page the on call physician.        For urgent appointments, please call [phone] and ask for  the triage nurse or your care team clinic nurse.  How to contact my care team:  [facility]: [email or web address]   Phone: [phone]   Fax: [phone]       [facility]:   Phone: [phone]  Hours: 8:00 AM - 6:00 PM  Medication Refills:  Call your pharmacy and they will forward the refill to us. Please allow 3 business days for your refills to be completed.       Normal or non-critical lab and imaging results will be communicated to you by MyChart, letter or phone within 7 days.  If you do not hear from us within 10 days, please call the clinic. If you have a critical or abnormal lab result, we will notify you by phone as soon as possible.       We now have PWIC (Pediatric Walk in Care)  Monday-Friday from 7:30-4. Simply walk in and be seen for your urgent needs like cough, fever, rash, diarrhea or vomiting, pink eye, UTI. No appointments needed. Ask one of the team for more information      -Your Care Team:    Dr. Jeff Doyle - Internal Medicine/Pediatrics   Dr. Jacqueline Song - Family Medicine  Dr. Elizabeth Hayes - Pediatrics  Melissa Liu CNP - Family Practice Nurse Practitioner  Dr. Sharmin Delgado - Pediatrics                       Follow-ups after your visit        Additional Services     GASTROENTEROLOGY ADULT REF CONSULT ONLY       Preferred Location: [facility], [facility]: [phone]      Please be aware that coverage of these services is subject to the terms and limitations of your health insurance plan.  Call member services at your health plan with any benefit or coverage questions.  Any procedures must be performed at a [facility] facility OR coordinated by your clinic's referral office.    Please bring the following with you to your appointment:    (1) Any X-Rays, CTs or MRIs which have been performed.  Contact the facility where they were done to arrange for  prior to your scheduled appointment.    (2) List of current medications   (3) This referral request   (4) Any  documents/labs given to you for this referral            NUTRITION REFERRAL       Your provider has referred you to: FM: [facility] [phone]   [email or web address]    Please be aware that coverage of these services is subject to the terms and limitations of your health insurance plan.  Call member services at your health plan with any benefit or coverage questions.      Please bring the following with you to your appointment:    (1) This referral request  (2) Any documents given to you regarding this referral  (3) Any specific questions you have about diet and/or food choices                  Your next 10 appointments already scheduled     Dec 15, 2017  9:00 AM CST   (Arrive by 8:45 AM)   Return Renal Calculi with Ludwig Vasquez MD   [facility] ([facility])    [address] [place] [id number] [phone]              Who to contact     If you have questions or need follow up information about today's clinic visit or your schedule please contact [facility] directly at [phone].  Normal or non-critical lab and imaging results will be communicated to you by MyChart, letter or phone within 4 business days after the clinic has received the results. If you do not hear from us within 7 days, please contact the clinic through MyChart or phone. If you have a critical or abnormal lab result, we will notify you by phone as soon as possible.  Submit refill requests through [employer] or call your pharmacy and they will forward the refill request to us. Please allow 3 business days for your refill to be completed.          Additional Information About Your Visit        MyChart Information     [employer] gives you secure access to your electronic health record. If you see a primary care provider, you can also send messages to your care team  and make appointments. If you have questions, please call your primary care clinic.  If you do not have a primary care provider, please call [phone] and they will assist you.      [employer] is an electronic gateway that provides easy, online access to your medical records. With [employer], you can request a clinic appointment, read your test results, renew a prescription or communicate with your care team.     To access your existing account, please contact your [facility] or call [phone] for assistance.        Care EveryWhere ID     This is your Care EveryWhere ID. This could be used by other organizations to access your [facility] medical records  [id number]        Your Vitals Were     Pulse Temperature Pulse Oximetry BMI (Body Mass Index)          86 97  F (36.1  C) (Temporal) 97% 38.88 kg/m2         Blood Pressure from Last 3 Encounters:   12/12/17 132/80   09/11/17 120/75   09/08/17 (!) 138/91    Weight from Last 3 Encounters:   12/12/17 240 lb 14.4 oz (109.3 kg)   09/11/17 235 lb 14.4 oz (107 kg)   09/08/17 233 lb 4.8 oz (105.8 kg)              We Performed the Following     GASTROENTEROLOGY ADULT REF CONSULT ONLY     NUTRITION REFERRAL          Where to get your medicines      These medications were sent to [facility] [address] [place] [address] [facility]  [id number] [place] [address]     Phone:  [phone]     metFORMIN 500 MG 24 hr tablet          Primary Care Provider Office Phone # Fax #    Melissa Danielle Liu, APRN [facility] [phone]       [address]  [place] [address]        Equal Access to Services     SAMANTHA SHANNON : Hadii clifton Beltrán, waaxda quyen, qaybta kaallaurel bose. So [facility] [phone].    ATENCIÓN: Si habla español, tiene a castillo disposición servicios gratuitos de asistencia lingüística. Llame al [phone].    We comply with applicable federal civil  rights laws and [place] laws. We do not discriminate on the basis of race, color, national origin, age, disability, sex, sexual orientation, or gender identity.            Thank you!     Thank you for choosing [facility]  for your care. Our goal is always to provide you with excellent care. Hearing back from our patients is one way we can continue to improve our services. Please take a few minutes to complete the written survey that you may receive in the mail after your visit with us. Thank you!             Your Updated Medication List - Protect others around you: Learn how to safely use, store and throw away your medicines at www.disposemymeds.org.          This list is accurate as of: 12/12/17  8:18 AM.  Always use your most recent med list.                   Brand Name Dispense Instructions for use Diagnosis    blood glucose monitoring test strip    no brand specified    50 strip    Needs One Touch Verio test strip. Use to test blood sugars 1-2 times daily or as directed    Diabetes mellitus, type 2 (H)       metFORMIN 500 MG 24 hr tablet    GLUCOPHAGE-XR    180 tablet    Take 1 tablet (500 mg) by mouth 2 times daily (with meals)    Type 2 diabetes mellitus with hyperglycemia, without long-term current use of insulin (H)

## 2017-12-12 NOTE — PATIENT INSTRUCTIONS
PLAN:   1.   Symptomatic therapy suggested: Continue current medications.  2.  Orders Placed This Encounter   Medications     metFORMIN (GLUCOPHAGE-XR) 500 MG 24 hr tablet     Sig: Take 1 tablet (500 mg) by mouth 2 times daily (with meals)     Dispense:  180 tablet     Refill:  1     Orders Placed This Encounter   Procedures     NUTRITION REFERRAL     GASTROENTEROLOGY ADULT REF CONSULT ONLY       3. Patient needs to follow up in if no improvement,or sooner if worsening of symptoms or other symptoms develop.  CONSULTATION/REFERRAL to Gastroenterology and Dietician  Follow up in 6 months.      It was a pleasure seeing you today at the [facility]. Thank you for allowing us to care for you today. We truly hope we provided you with the excellent service you deserve. Please let us know if there is anything else we can do for you so we can be sure you are leaving completley satisfied with your care experience.       General information about your clinic   Clinic Hours Lab Hours (Appointments are required)   Mon-Thurs: 7:30 AM - 7 PM Mon-Thurs: 7:30 AM - 7 PM   Fri: 7:30 AM - 5 PM Fri: 7:30 AM - 5 PM        After Hours Nurse Advise & Appts:  [facility]: [phone]  [facility] On Call: to make appointments anytime: [phone] On Call Physician: call [phone] and answering service will page the on call physician.        For urgent appointments, please call [phone] and ask for the triage nurse or your care team clinic nurse.  How to contact my care team:  [facility]: [email or web address]   Phone: [phone]   Fax: [phone]       [facility]:   Phone: [phone]  Hours: 8:00 AM - 6:00 PM  Medication Refills:  Call your pharmacy and they will forward the refill to us. Please allow 3 business days for your refills to be completed.       Normal or non-critical lab and imaging results will be communicated to you by MyChart, letter or phone within 7 days.  If you  do not hear from us within 10 days, please call the clinic. If you have a critical or abnormal lab result, we will notify you by phone as soon as possible.       We now have PWIC (Pediatric Walk in Care)  Monday-Friday from 7:30-4. Simply walk in and be seen for your urgent needs like cough, fever, rash, diarrhea or vomiting, pink eye, UTI. No appointments needed. Ask one of the team for more information      -Your Care Team:    Dr. Jeff Doyle - Internal Medicine/Pediatrics   Dr. Jacqueline Song - Family Medicine  Dr. Elizabeth Hayes - Pediatrics  Melissa Liu CNP - Family Practice Nurse Practitioner  Dr. Sharmin Delgado - Pediatrics
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
## 2017-12-12 NOTE — PROGRESS NOTES
Ekaterina Rae,    Attached are your test results.  -Liver and gallbladder tests (ALT,AST, Alk phos,bilirubin) are elevated. ADVISE: further testing - will refer to Gastroenterology    (DX: elevated LFTs)  -Kidney function (GFR) is normal.  -Sodium is normal.  -Potassium is normal.    -A1C (test of diabetes control the last 2-3 months) is at your goal. Please continue with current plan. Also, see me and recheck your A1C test in 6 months.    Please contact us if you have any questions.    Melissa Liu, CNP

## 2017-12-14 DIAGNOSIS — N20.1 CALCULUS OF URETER: Primary | ICD-10-CM

## 2017-12-14 RX ORDER — CEFAZOLIN SODIUM 1 G/3ML
1 INJECTION, POWDER, FOR SOLUTION INTRAMUSCULAR; INTRAVENOUS SEE ADMIN INSTRUCTIONS
Status: CANCELLED | OUTPATIENT
Start: 2017-12-14

## 2017-12-14 RX ORDER — ACETAMINOPHEN 325 MG/1
975 TABLET ORAL ONCE
Status: CANCELLED | OUTPATIENT
Start: 2017-12-14 | End: 2017-12-14

## 2017-12-18 ENCOUNTER — OFFICE VISIT (OUTPATIENT)
Dept: NUTRITION | Facility: CLINIC | Age: 42
End: 2017-12-18
Attending: NURSE PRACTITIONER
Payer: COMMERCIAL

## 2017-12-18 DIAGNOSIS — E11.9 TYPE 2 DIABETES MELLITUS (H): Primary | ICD-10-CM

## 2017-12-18 PROCEDURE — 97802 MEDICAL NUTRITION INDIV IN: CPT | Performed by: DIETITIAN, REGISTERED

## 2017-12-18 NOTE — PROGRESS NOTES
REPORT OF MEDICAL NUTRITION THERAPY    Patient Name: Jose Luis Rae  MRN: [id number],  Age: 42 year old,  Gender: male    Encounter Date: 12/18/2017,  Encounter Time:60 minute Initial    Provider: Emelina Martin, CANELO, LD, CDE    Referral received to provide Medical Nutrition Therapy for patient for treatment of type 2 diabetes.    NUTRITION HISTORY:  Mikhail reports that he became aware that he had diabetes last April or May when he was diagnosed with kidney stones and found to have hyperglycemia.  Initially, he noted changes in his condition starting last Christmas   (2016) when he started to lose weight without making any changes to his diet or lifestyle.  He lost 25 pounds.  When diabetes was diagnosed and he was started on Metformin, he regained the weight.  He has overall gained 60 pounds since he was 28 ears old.  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  Presently he works as a  for [employer].  Some days he is moving but not always.    He has had one occurrence of kidney stone which he reports is still in place after failed attempt to remove it.  He has not had urinalysis to determine risk factors and he is unaware of type of stone.    He has been using [employer] service recently for fresh food delivery to help him prepare meals at home.  He reports this costs $10.00 per meal, but it has helped him.  He likes to cook but finds it challenging to cook for 1.  He has not been a breakfast eater, but with Metformin in the morning he has a granola bar now.    Usual intake consists of:  Breakfast:6:30-7:00 am  Granola bar and Diet Mountain Dew  Snack:  Lunch:Lean cuisine frozen meal, yogurt, small chips  Snack:Cookie or vending machine  2-3 diet mountain dews per day  Dinner: Balanced meal, large portions.  Snack:fruit, nachos    Mikhail states when he was younger and very active at work he ate lots of fast food, junk  "food and regular soda with high activity level.  In recent years his intake has been way cut back.  He observes he does not have the appetite for eating that he observes others to have.      DATA:    BMI 38.88 kg/m2 Estimated body mass index is 38.88 kg/(m^2) as calculated from the following:    Height as of 9/8/17: 5' 6\" (1.676 m).    Weight as of 12/12/17: 240 lb 14.4 oz (109.3 kg).  BP Readings from Last 5 Encounters:   12/12/17 132/80   09/11/17 120/75   09/08/17 (!) 138/91   07/21/17 (!) 136/96   06/20/17 (!) 154/99     LABS:    Lab Results   Component Value Date    A1C 6.9 12/12/2017    A1C 6.6 09/11/2017    A1C 10.1 06/13/2017     Recent Labs   Lab Test  09/11/17   0838  06/13/17   1621   CHOL  213*  211*   HDL  34*  40   LDL  121*  135*   TRIG  290*  181*       MEDICATIONS:  Current Outpatient Prescriptions   Medication     metFORMIN (GLUCOPHAGE-XR) 500 MG 24 hr tablet     blood glucose monitoring (NO BRAND SPECIFIED) test strip     No current facility-administered medications for this visit.        ASSESSMENT:    Mikhail has improved glucose control since addition of Metformin and some diet changes but still has symptoms of Metabolic Syndrome as indicated by abnormal glucose, low HDL, high triglycerides and waist circumference.  He is motivated to lose weight and control diabetes but had a lack of knowledge about insulin resistance and impact on metabolic disease.      In addition, he would benefit from better understanding of kidney stones and initiation of nutrition approach to help prevent metabolic stone disease.      INTERVENTION:    Role of nutrition in glucose control was discussed.  Carbohydrate in food and role in glucose management was discussed.  Targets for glucose monitoring were discussed, fasting and post meal.    Metabolic syndrome of insulin resistance was discussed. Strategy for fighting insulin resistance was discussed. Impact of this syndrome on weight gain promotion was reviewed.  " Importance of adequate intake of health promoting food in a balanced and consistent pattern was emphasized along with the very important role of movement throughout the day in fighting insulin resistance.  Role of nutrition in weight control was discussed.  In addition to calories in food, importance of balance and consistency of meals with adequate intake of health promoting foods was discussed.  Consistent carbohydrate meal planning was reviewed.  Patient will include 3 meals daily, each with usually no more than 60 grams of carbohydrate and snacks when needed between meals,15-30 grams of carbohydrate.  Importance of reducing saturated fat by using low fat meat and dairy was discussed.  Carb choices are recommended to be from whole grains and vegetables, whole fruit and dairy.  Mediterranean style food pattern was discussed as a model for food inclusion.    Energy balance was discussed, with focus on balance of foods consumed, amount of food eaten, timing of meals and activity level.  Written materials were provided and questions were answered.    PLAN:  Goals:  Target weight loss of 25 pounds in 4 months with the following strategy:   1. Aim for no more than 4273-0491 calories per day.  Use My Fitness Pal to track.  2. Follow carbohydrate distribution for diabetes.  Select more whole foods.  3. Use balanced plate model, increasing whole foods and vegetables, balanced portion control.  4. Think about activity plan.  Could use steps and increase number up 10% from baseline.  5. Take Men's formula multi-vitamin and mineral supplement.  6. Follow up after  visit.      [address] [address]  [phone]  [phone]    Date: 12/18/2017  Time: 2:10 PM
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
## 2018-01-19 ENCOUNTER — TELEPHONE (OUTPATIENT)
Dept: UROLOGY | Facility: CLINIC | Age: 43
End: 2018-01-19

## 2018-02-06 NOTE — TELEPHONE ENCOUNTER
Spoke with the patient and he has decided not to go through with the surgery at this time. Patient was given our clinic number if he should change his mind.

## 2018-03-06 ENCOUNTER — TELEPHONE (OUTPATIENT)
Dept: GASTROENTEROLOGY | Facility: CLINIC | Age: 43
End: 2018-03-06

## 2018-03-06 NOTE — TELEPHONE ENCOUNTER
PREVISIT INFORMATION                                                    Jose Luis ALLAN Rae scheduled for future visit at [facility] specialty clinics.    Patient is scheduled to see Dr. Rich on 3/22/18  Reason for visit: Hepatomegaly  Referring provider Melissa Liu  Has patient seen previous specialist? No  Medical Records:  Available in chart.  Patient was previously seen at a [place] or [facility] facility.     REVIEW                                                      New patient packet mailed to patient: No  Medication reconciliation complete: No      Current Outpatient Prescriptions   Medication Sig Dispense Refill     metFORMIN (GLUCOPHAGE-XR) 500 MG 24 hr tablet Take 1 tablet (500 mg) by mouth 2 times daily (with meals) 180 tablet 1     blood glucose monitoring (NO BRAND SPECIFIED) test strip Needs One Touch Verio test strip. Use to test blood sugars 1-2 times daily or as directed 50 strip 3       Allergies: Tdap [daptacel]        PLAN/FOLLOW-UP NEEDED                                                      Nurse called and left message on VM with date/time of upcoming appointment, along with number to return call if additional records need to be obtain or appointment needs to be cancelled/rescheduled.    Patient Reminders Given:  Please, make sure you bring an updated list of your medications.   If you are having a procedure, please, present 15 minutes early.  If you need to cancel or reschedule,please call [phone].    Kiarra Waters

## 2018-03-22 ENCOUNTER — OFFICE VISIT (OUTPATIENT)
Dept: GASTROENTEROLOGY | Facility: CLINIC | Age: 43
End: 2018-03-22
Attending: NURSE PRACTITIONER
Payer: COMMERCIAL

## 2018-03-22 VITALS
HEIGHT: 67 IN | SYSTOLIC BLOOD PRESSURE: 156 MMHG | WEIGHT: 244.5 LBS | BODY MASS INDEX: 38.37 KG/M2 | DIASTOLIC BLOOD PRESSURE: 97 MMHG | HEART RATE: 109 BPM

## 2018-03-22 DIAGNOSIS — K75.81 NASH (NONALCOHOLIC STEATOHEPATITIS): Primary | ICD-10-CM

## 2018-03-22 PROCEDURE — 99204 OFFICE O/P NEW MOD 45 MIN: CPT | Performed by: INTERNAL MEDICINE

## 2018-03-22 ASSESSMENT — PAIN SCALES - GENERAL: PAINLEVEL: NO PAIN (0)
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
## 2018-03-22 NOTE — NURSING NOTE
"Jose Luis Rae's goals for this visit include:   Chief Complaint   Patient presents with     Consult     Elevated Liver function       He requests these members of his care team be copied on today's visit information: yes    PCP: Melissa Liu    Referring Provider:  Melissa Liu, APRN CNP  [address]  [place] [address]    Chief Complaint   Patient presents with     Consult     Elevated Liver function       Initial BP (!) 156/97  Pulse 109  Ht 1.689 m (5' 6.5\")  Wt 110.9 kg (244 lb 8 oz)  BMI 38.87 kg/m2 Estimated body mass index is 38.87 kg/(m^2) as calculated from the following:    Height as of this encounter: 1.689 m (5' 6.5\").    Weight as of this encounter: 110.9 kg (244 lb 8 oz).  Medication Reconciliation: complete    Do you need any medication refills at today's visit? New patient       "

## 2018-03-22 NOTE — MR AVS SNAPSHOT
After Visit Summary   3/22/2018    Jose Luis Rae    MRN: [id number]           Patient Information     Date Of Birth          1975        Visit Information        Provider Department      3/22/2018 3:00 PM Jenny Rich MD [facility]        Today's Diagnoses     LOOMIS (nonalcoholic steatohepatitis)    -  1       Follow-ups after your visit        Your next 10 appointments already scheduled     Jun 12, 2018  7:50 AM CDT   Return Visit with LOUIE Salcedo CNP   [facility] ([facility])    [address] [place] [id number] [phone]            Sep 19, 2018  2:30 PM CDT   (Arrive by 2:15 PM)   FIBROSIS SCAN with  FIBROSIS SCAN   [facility] Hepatology [facility])    [address] [place] [address]  [address] [place] [address] [id number] [phone]            Sep 27, 2018  4:00 PM CDT   Return Visit with Jenny Rich MD   [facility] ([facility])    [address] [place] [address] [id number] [phone]              Future tests that were ordered for you today     Open Future Orders        Priority Expected Expires Ordered    Fibrosis Scan (In-Clinic) Routine 3/22/2018 3/22/2019 3/22/2018            Who to contact     If you have questions or need follow up information about today's clinic visit or your schedule please contact [facility] directly at [phone].  Normal or non-critical lab and imaging results will be communicated to you by MyChart, letter or phone within 4 business days after the clinic has received the results. If you do not hear from us within 7 days, please contact the clinic through MyChart or phone. If you have a critical or abnormal lab result, we will notify you by phone as soon as possible.  Submit refill requests through [employer] or call your pharmacy and they will forward the refill request to us. Please allow 3  "business days for your refill to be completed.          Additional Information About Your Visit        [employer] Information     [employer] gives you secure access to your electronic health record. If you see a primary care provider, you can also send messages to your care team and make appointments. If you have questions, please call your primary care clinic.  If you do not have a primary care provider, please call [phone] and they will assist you.      [employer] is an electronic gateway that provides easy, online access to your medical records. With [employer], you can request a clinic appointment, read your test results, renew a prescription or communicate with your care team.     To access your existing account, please contact your [facility] or call [phone] for assistance.        Care EveryWhere ID     This is your Care EveryWhere ID. This could be used by other organizations to access your [place] medical records  [id number]        Your Vitals Were     Pulse Height BMI (Body Mass Index)             109 1.689 m (5' 6.5\") 38.87 kg/m2          Blood Pressure from Last 3 Encounters:   03/22/18 (!) 156/97   12/12/17 132/80   09/11/17 120/75    Weight from Last 3 Encounters:   03/22/18 110.9 kg (244 lb 8 oz)   12/12/17 109.3 kg (240 lb 14.4 oz)   09/11/17 107 kg (235 lb 14.4 oz)               Primary Care Provider Office Phone # Fax #    Melissa Asenciodebby Liu, APRN [facility] [phone]       [address]  [place] [address]        Equal Access to Services     [facility] : Hadii aad ku hadasho Soomaali, waaxda luqadaha, qaybta kaalmada adeegyada, waxbushra emmy dominguez . So [facility] [phone].    ATENCIÓN: Si habla español, tiene a castillo disposición servicios gratuitos de asistencia lingüística. Llame al [phone].    We comply with applicable federal civil rights laws and [place] laws. We do not discriminate on the basis of race, color, " national origin, age, disability, sex, sexual orientation, or gender identity.            Thank you!     Thank you for choosing [facility]  for your care. Our goal is always to provide you with excellent care. Hearing back from our patients is one way we can continue to improve our services. Please take a few minutes to complete the written survey that you may receive in the mail after your visit with us. Thank you!             Your Updated Medication List - Protect others around you: Learn how to safely use, store and throw away your medicines at www.disposemymeds.org.          This list is accurate as of 3/22/18  4:13 PM.  Always use your most recent med list.                   Brand Name Dispense Instructions for use Diagnosis    blood glucose monitoring test strip    no brand specified    50 strip    Needs One Touch Verio test strip. Use to test blood sugars 1-2 times daily or as directed    Diabetes mellitus, type 2 (H)       metFORMIN 500 MG 24 hr tablet    GLUCOPHAGE-XR    180 tablet    Take 1 tablet (500 mg) by mouth 2 times daily (with meals)    Type 2 diabetes mellitus with hyperglycemia, without long-term current use of insulin (H)

## 2018-06-07 DIAGNOSIS — E11.65 TYPE 2 DIABETES MELLITUS WITH HYPERGLYCEMIA, WITHOUT LONG-TERM CURRENT USE OF INSULIN (H): Primary | ICD-10-CM

## 2018-06-11 DIAGNOSIS — E11.65 TYPE 2 DIABETES MELLITUS WITH HYPERGLYCEMIA, WITHOUT LONG-TERM CURRENT USE OF INSULIN (H): ICD-10-CM

## 2018-06-11 LAB
ANION GAP SERPL CALCULATED.3IONS-SCNC: 9 MMOL/L (ref 3–14)
BUN SERPL-MCNC: 13 MG/DL (ref 7–30)
CALCIUM SERPL-MCNC: 9.1 MG/DL (ref 8.5–10.1)
CHLORIDE SERPL-SCNC: 106 MMOL/L (ref 94–109)
CO2 SERPL-SCNC: 26 MMOL/L (ref 20–32)
CREAT SERPL-MCNC: 0.86 MG/DL (ref 0.66–1.25)
GFR SERPL CREATININE-BSD FRML MDRD: >90 ML/MIN/1.7M2
GLUCOSE SERPL-MCNC: 147 MG/DL (ref 70–99)
HBA1C MFR BLD: 7.1 % (ref 0–5.6)
POTASSIUM SERPL-SCNC: 3.6 MMOL/L (ref 3.4–5.3)
SODIUM SERPL-SCNC: 141 MMOL/L (ref 133–144)

## 2018-06-11 PROCEDURE — 80076 HEPATIC FUNCTION PANEL: CPT | Performed by: NURSE PRACTITIONER

## 2018-06-11 PROCEDURE — 36415 COLL VENOUS BLD VENIPUNCTURE: CPT | Performed by: NURSE PRACTITIONER

## 2018-06-11 PROCEDURE — 80048 BASIC METABOLIC PNL TOTAL CA: CPT | Performed by: NURSE PRACTITIONER

## 2018-06-11 PROCEDURE — 83036 HEMOGLOBIN GLYCOSYLATED A1C: CPT | Performed by: NURSE PRACTITIONER

## 2018-06-11 NOTE — PROGRESS NOTES
Ekaterina Rae,    Attached are your test results.  -A1C (test of diabetes control the last 2-3 months) is just slight above  your goal. Please continue with current plan. Also, see me tomorrow as planned  and follow up appointment and recheck your A1C test in 6 months.    Please contact us if you have any questions.    Melissa Liu, CNP

## 2018-06-12 ENCOUNTER — OFFICE VISIT (OUTPATIENT)
Dept: PEDIATRICS | Facility: CLINIC | Age: 43
End: 2018-06-12
Payer: COMMERCIAL

## 2018-06-12 VITALS
HEART RATE: 81 BPM | SYSTOLIC BLOOD PRESSURE: 122 MMHG | BODY MASS INDEX: 38.32 KG/M2 | WEIGHT: 241 LBS | TEMPERATURE: 97.6 F | DIASTOLIC BLOOD PRESSURE: 90 MMHG | OXYGEN SATURATION: 96 %

## 2018-06-12 DIAGNOSIS — E78.5 HYPERLIPIDEMIA WITH TARGET LDL LESS THAN 100: ICD-10-CM

## 2018-06-12 DIAGNOSIS — E11.65 TYPE 2 DIABETES MELLITUS WITH HYPERGLYCEMIA, WITHOUT LONG-TERM CURRENT USE OF INSULIN (H): Primary | ICD-10-CM

## 2018-06-12 DIAGNOSIS — R79.89 ABNORMAL LIVER FUNCTION TESTS: ICD-10-CM

## 2018-06-12 LAB
ALBUMIN SERPL-MCNC: 4.2 G/DL (ref 3.4–5)
ALP SERPL-CCNC: 81 U/L (ref 40–150)
ALT SERPL W P-5'-P-CCNC: 140 U/L (ref 0–70)
AST SERPL W P-5'-P-CCNC: 57 U/L (ref 0–45)
BILIRUB DIRECT SERPL-MCNC: 0.2 MG/DL (ref 0–0.2)
BILIRUB SERPL-MCNC: 1.3 MG/DL (ref 0.2–1.3)
PROT SERPL-MCNC: 7.8 G/DL (ref 6.8–8.8)

## 2018-06-12 PROCEDURE — 99214 OFFICE O/P EST MOD 30 MIN: CPT | Performed by: NURSE PRACTITIONER

## 2018-06-12 RX ORDER — METFORMIN HCL 500 MG
500 TABLET, EXTENDED RELEASE 24 HR ORAL 2 TIMES DAILY WITH MEALS
Qty: 180 TABLET | Refills: 1 | Status: SHIPPED | OUTPATIENT
Start: 2018-06-12 | End: 2018-12-06

## 2018-06-12 RX ORDER — ATORVASTATIN CALCIUM 10 MG/1
10 TABLET, FILM COATED ORAL DAILY
Qty: 90 TABLET | Refills: 1 | Status: SHIPPED | OUTPATIENT
Start: 2018-06-12 | End: 2018-12-06

## 2018-06-12 NOTE — NURSING NOTE
"Chief Complaint   Patient presents with     Diabetes       Initial /90 (BP Location: Right arm, Patient Position: Sitting, Cuff Size: Adult Large)  Pulse 81  Temp 97.6  F (36.4  C) (Temporal)  Wt 241 lb (109.3 kg)  SpO2 96%  BMI 38.32 kg/m2 Estimated body mass index is 38.32 kg/(m^2) as calculated from the following:    Height as of 3/22/18: 5' 6.5\" (1.689 m).    Weight as of this encounter: 241 lb (109.3 kg).  Medication Reconciliation: complete      KHALIDA Kahn      "

## 2018-06-12 NOTE — PROGRESS NOTES
Ekaterina Rae,    Attached are your test results.  -Liver and gallbladder tests (ALT,AST, Alk phos,bilirubin) are stable  With your lipid panel and diabetes lets go ahead and place on a lipitor once a day and recheck lipids and liver tests in 2 months.   Please make lab only appointment in 2 months on the new medication   Will route your labs as well to Dr Hernandez so he is aware as well.    Please contact us if you have any questions.    Melissa Liu, CNP

## 2018-06-12 NOTE — MR AVS SNAPSHOT
After Visit Summary   6/12/2018    Jose Luis Rae    MRN: [id number]           Patient Information     Date Of Birth          1975        Visit Information        Provider Department      6/12/2018 7:50 AM Melissa Liu APRN CNP [facility]        Today's Diagnoses     Type 2 diabetes mellitus with hyperglycemia, without long-term current use of insulin (H)    -  1    Abnormal liver function tests        Hyperlipidemia with target LDL less than 100          Care Instructions    PLAN:   1.   Symptomatic therapy suggested: Continue current medications.    2.  Orders Placed This Encounter   Medications     metFORMIN (GLUCOPHAGE-XR) 500 MG 24 hr tablet     Sig: Take 1 tablet (500 mg) by mouth 2 times daily (with meals)     Dispense:  180 tablet     Refill:  1     Orders Placed This Encounter   Procedures     Hepatic panel     3. Patient needs to follow up in if no improvement,or sooner if worsening of symptoms or other symptoms develop.  Work on weight loss  Regular exercise  Follow up in 6 months.  Schedule physical exam     It was a pleasure seeing you today at the [facility]. Thank you for allowing us to care for you today. We truly hope we provided you with the excellent service you deserve. Please let us know if there is anything else we can do for you so we can be sure you are leaving completley satisfied with your care experience.       General information about your clinic   Clinic Hours Lab Hours (Appointments are required)   Mon-Thurs: 7:30 AM - 7 PM Mon-Thurs: 7:30 AM - 7 PM   Fri: 7:30 AM - 5 PM Fri: 7:30 AM - 5 PM        After Hours Nurse Advise & Appts:  [facility]: [phone]  [facility] On Call: to make appointments anytime: [phone] On Call Physician: call [phone] and answering service will page the on call physician.        For urgent appointments, please call [phone] and ask for the triage nurse  or your care team clinic nurse.  How to contact my care team:  [facility]: [email or web address]   Phone: [phone]   Fax: [phone]       [facility]:   Phone: [phone]  Hours: 8:00 AM - 6:00 PM  Medication Refills:  Call your pharmacy and they will forward the refill to us. Please allow 3 business days for your refills to be completed.       Normal or non-critical lab and imaging results will be communicated to you by MyChart, letter or phone within 7 days.  If you do not hear from us within 10 days, please call the clinic. If you have a critical or abnormal lab result, we will notify you by phone as soon as possible.       We now have PWIC (Pediatric Walk in Care)  Monday-Friday from 7:30-4. Simply walk in and be seen for your urgent needs like cough, fever, rash, diarrhea or vomiting, pink eye, UTI. No appointments needed. Ask one of the team for more information      -Your Care Team:    Dr. Jeff Doyle - Internal Medicine/Pediatrics   Dr. Jacqueline Song - Family Medicine  Dr. Elizabeth Hayes - Pediatrics  Dr. Sharmin Delgado - Pediatrics  Melissa Liu CNP - Family Practice Nurse Practitioner                         Follow-ups after your visit        Your next 10 appointments already scheduled     Sep 19, 2018  2:30 PM CDT   (Arrive by 2:15 PM)   FIBROSIS SCAN with  FIBROSIS SCAN   [facility] Hepatology ([facility] [place])    [address] [id number] [phone]            Oct 11, 2018  4:00 PM CDT   Return Visit with Jenny Rich MD   [facility] ([facility])    [address] [place] [id number] [phone]              Who to contact     If you have questions or need follow up information about today's clinic visit or your schedule please contact [facility] directly at [phone].  Normal or non-critical lab and imaging results will be communicated to you by [employer], letter  or phone within 4 business days after the clinic has received the results. If you do not hear from us within 7 days, please contact the clinic through [employer] or phone. If you have a critical or abnormal lab result, we will notify you by phone as soon as possible.  Submit refill requests through [employer] or call your pharmacy and they will forward the refill request to us. Please allow 3 business days for your refill to be completed.          Additional Information About Your Visit        [employer] Information     [employer] gives you secure access to your electronic health record. If you see a primary care provider, you can also send messages to your care team and make appointments. If you have questions, please call your primary care clinic.  If you do not have a primary care provider, please call [phone] and they will assist you.      [employer] is an electronic gateway that provides easy, online access to your medical records. With [employer], you can request a clinic appointment, read your test results, renew a prescription or communicate with your care team.     To access your existing account, please contact your [facility] or call [phone] for assistance.        Care EveryWhere ID     This is your Care EveryWhere ID. This could be used by other organizations to access your [place] medical records  [id number]        Your Vitals Were     Pulse Temperature Pulse Oximetry BMI (Body Mass Index)          81 97.6  F (36.4  C) (Temporal) 96% 38.32 kg/m2         Blood Pressure from Last 3 Encounters:   06/12/18 122/90   03/22/18 (!) 156/97   12/12/17 132/80    Weight from Last 3 Encounters:   06/12/18 241 lb (109.3 kg)   03/22/18 244 lb 8 oz (110.9 kg)   12/12/17 240 lb 14.4 oz (109.3 kg)              We Performed the Following     Hepatic panel          Where to get your medicines      These medications were sent to [facility] [address] [place] [address] [facility]  [address] [facility]  [facility] [address]     Phone:  [phone]     metFORMIN 500 MG 24 hr tablet          Primary Care Provider Office Phone # Fax #    Melissa Liu, LOUIE [facility] [phone] [address]  [place] [address]        Equal Access to Services     SAMANTHA SHANNON : Hadii aad ku hadasho Soomaali, waaxda luqadaha, qaybta kaalmada adeegyada, waxay idiin hayaan adejun khmadisynsh lanadia . So [facility] [phone].    ATENCIÓN: Si habla español, tiene a castillo disposición servicios gratuitos de asistencia lingüística. Pamela al [phone].    We comply with applicable federal civil rights laws and [place] laws. We do not discriminate on the basis of race, color, national origin, age, disability, sex, sexual orientation, or gender identity.            Thank you!     Thank you for choosing [facility]  for your care. Our goal is always to provide you with excellent care. Hearing back from our patients is one way we can continue to improve our services. Please take a few minutes to complete the written survey that you may receive in the mail after your visit with us. Thank you!             Your Updated Medication List - Protect others around you: Learn how to safely use, store and throw away your medicines at www.disposemymeds.org.          This list is accurate as of 6/12/18  8:20 AM.  Always use your most recent med list.                   Brand Name Dispense Instructions for use Diagnosis    blood glucose monitoring test strip    no brand specified    50 strip    Needs One Touch Verio test strip. Use to test blood sugars 1-2 times daily or as directed    Diabetes mellitus, type 2 (H)       metFORMIN 500 MG 24 hr tablet    GLUCOPHAGE-XR    180 tablet    Take 1 tablet (500 mg) by mouth 2 times daily (with meals)    Type 2 diabetes mellitus with hyperglycemia, without long-term current use of insulin (H)
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
## 2018-06-12 NOTE — PATIENT INSTRUCTIONS
PLAN:   1.   Symptomatic therapy suggested: Continue current medications.    2.  Orders Placed This Encounter   Medications     metFORMIN (GLUCOPHAGE-XR) 500 MG 24 hr tablet     Sig: Take 1 tablet (500 mg) by mouth 2 times daily (with meals)     Dispense:  180 tablet     Refill:  1     Orders Placed This Encounter   Procedures     Hepatic panel     3. Patient needs to follow up in if no improvement,or sooner if worsening of symptoms or other symptoms develop.  Work on weight loss  Regular exercise  Follow up in 6 months.  Schedule physical exam     It was a pleasure seeing you today at the [facility]. Thank you for allowing us to care for you today. We truly hope we provided you with the excellent service you deserve. Please let us know if there is anything else we can do for you so we can be sure you are leaving completley satisfied with your care experience.       General information about your clinic   Clinic Hours Lab Hours (Appointments are required)   Mon-Thurs: 7:30 AM - 7 PM Mon-Thurs: 7:30 AM - 7 PM   Fri: 7:30 AM - 5 PM Fri: 7:30 AM - 5 PM        After Hours Nurse Advise & Appts:  [facility]: [phone]  [place] [facility]: to make appointments anytime: [phone] On Call Physician: call [phone] and answering service will page the on call physician.        For urgent appointments, please call [phone] and ask for the triage nurse or your care team clinic nurse.  How to contact my care team:  [facility]: [email or web address]   Phone: [phone]   Fax: [phone]       [facility]:   Phone: [phone]  Hours: 8:00 AM - 6:00 PM  Medication Refills:  Call your pharmacy and they will forward the refill to us. Please allow 3 business days for your refills to be completed.       Normal or non-critical lab and imaging results will be communicated to you by MyChart, letter or phone within 7 days.  If you do not hear from us within 10 days, please  call the clinic. If you have a critical or abnormal lab result, we will notify you by phone as soon as possible.       We now have PWIC (Pediatric Walk in Care)  Monday-Friday from 7:30-4. Simply walk in and be seen for your urgent needs like cough, fever, rash, diarrhea or vomiting, pink eye, UTI. No appointments needed. Ask one of the team for more information      -Your Care Team:    Dr. Jeff Doyle - Internal Medicine/Pediatrics   Dr. Jacqueline Song - Family Medicine  Dr. Elizabeth Hayes - Pediatrics  Dr. Sharmin Delgado - Pediatrics  Melissa Liu CNP - Family Practice Nurse Practitioner

## 2018-07-16 NOTE — NURSING NOTE
"Chief Complaint   Patient presents with     Diabetes     patient fasting today       Initial /75 (BP Location: Right arm, Patient Position: Sitting)  Pulse 75  Temp 97.2  F (36.2  C) (Temporal)  Wt 235 lb 14.4 oz (107 kg)  SpO2 96%  BMI 38.08 kg/m2 Estimated body mass index is 38.08 kg/(m^2) as calculated from the following:    Height as of 9/8/17: 5' 6\" (1.676 m).    Weight as of this encounter: 235 lb 14.4 oz (107 kg).  Medication Reconciliation: complete      KHALIDA Kahn      " How Severe Is Your Cyst?: moderate Is This A New Presentation, Or A Follow-Up?: Cyst

## 2018-08-15 DIAGNOSIS — K75.81 NASH (NONALCOHOLIC STEATOHEPATITIS): ICD-10-CM

## 2018-08-15 PROCEDURE — 91200 LIVER ELASTOGRAPHY: CPT | Mod: ZF

## 2018-08-23 ENCOUNTER — OFFICE VISIT (OUTPATIENT)
Dept: GASTROENTEROLOGY | Facility: CLINIC | Age: 43
End: 2018-08-23
Payer: COMMERCIAL

## 2018-08-23 VITALS
SYSTOLIC BLOOD PRESSURE: 126 MMHG | BODY MASS INDEX: 38.96 KG/M2 | DIASTOLIC BLOOD PRESSURE: 87 MMHG | HEIGHT: 66 IN | OXYGEN SATURATION: 98 % | WEIGHT: 242.4 LBS | HEART RATE: 72 BPM

## 2018-08-23 DIAGNOSIS — K75.81 NASH (NONALCOHOLIC STEATOHEPATITIS): Primary | ICD-10-CM

## 2018-08-23 PROCEDURE — 99214 OFFICE O/P EST MOD 30 MIN: CPT | Performed by: INTERNAL MEDICINE

## 2018-08-23 RX ORDER — UREA 10 %
LOTION (ML) TOPICAL
Qty: 30 TABLET | Status: CANCELLED | OUTPATIENT
Start: 2018-08-23

## 2018-08-23 ASSESSMENT — PAIN SCALES - GENERAL: PAINLEVEL: NO PAIN (0)
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
## 2018-11-28 ENCOUNTER — MYC MEDICAL ADVICE (OUTPATIENT)
Dept: PEDIATRICS | Facility: CLINIC | Age: 43
End: 2018-11-28

## 2018-11-28 DIAGNOSIS — E78.5 HYPERLIPIDEMIA WITH TARGET LDL LESS THAN 100: ICD-10-CM

## 2018-11-28 DIAGNOSIS — E11.65 TYPE 2 DIABETES MELLITUS WITH HYPERGLYCEMIA, WITHOUT LONG-TERM CURRENT USE OF INSULIN (H): Primary | ICD-10-CM

## 2018-11-30 NOTE — TELEPHONE ENCOUNTER
Patient is calling to request a lab order to have his A1C checked. He will be coming in for labs Monday 12/3. Please advise.

## 2018-11-30 NOTE — TELEPHONE ENCOUNTER
Lab pended and routed to provider for approval.    Kaela Wiseman RN   [facility]

## 2018-12-01 NOTE — TELEPHONE ENCOUNTER
Next 5 appointments (look out 90 days)     Dec 06, 2018  3:30 PM CST   MyChart Long with LOUIE Salcedo CNP   [facility] ([facility]    [address] [address] [place] [id number]   [phone]                Done

## 2018-12-03 DIAGNOSIS — E11.65 TYPE 2 DIABETES MELLITUS WITH HYPERGLYCEMIA, WITHOUT LONG-TERM CURRENT USE OF INSULIN (H): ICD-10-CM

## 2018-12-03 DIAGNOSIS — E78.5 HYPERLIPIDEMIA WITH TARGET LDL LESS THAN 100: ICD-10-CM

## 2018-12-03 LAB
ALBUMIN SERPL-MCNC: 4 G/DL (ref 3.4–5)
ALP SERPL-CCNC: 84 U/L (ref 40–150)
ALT SERPL W P-5'-P-CCNC: 66 U/L (ref 0–70)
ANION GAP SERPL CALCULATED.3IONS-SCNC: 9 MMOL/L (ref 3–14)
AST SERPL W P-5'-P-CCNC: 28 U/L (ref 0–45)
BILIRUB SERPL-MCNC: 1.1 MG/DL (ref 0.2–1.3)
BUN SERPL-MCNC: 15 MG/DL (ref 7–30)
CALCIUM SERPL-MCNC: 8.6 MG/DL (ref 8.5–10.1)
CHLORIDE SERPL-SCNC: 108 MMOL/L (ref 94–109)
CHOLEST SERPL-MCNC: 144 MG/DL
CO2 SERPL-SCNC: 23 MMOL/L (ref 20–32)
CREAT SERPL-MCNC: 0.78 MG/DL (ref 0.66–1.25)
GFR SERPL CREATININE-BSD FRML MDRD: >90 ML/MIN/1.7M2
GLUCOSE SERPL-MCNC: 175 MG/DL (ref 70–99)
HBA1C MFR BLD: 7.1 % (ref 0–5.6)
HDLC SERPL-MCNC: 35 MG/DL
LDLC SERPL CALC-MCNC: 64 MG/DL
NONHDLC SERPL-MCNC: 109 MG/DL
POTASSIUM SERPL-SCNC: 3.9 MMOL/L (ref 3.4–5.3)
PROT SERPL-MCNC: 7.6 G/DL (ref 6.8–8.8)
SODIUM SERPL-SCNC: 140 MMOL/L (ref 133–144)
TRIGL SERPL-MCNC: 227 MG/DL

## 2018-12-03 PROCEDURE — 80061 LIPID PANEL: CPT | Performed by: NURSE PRACTITIONER

## 2018-12-03 PROCEDURE — 83036 HEMOGLOBIN GLYCOSYLATED A1C: CPT | Performed by: NURSE PRACTITIONER

## 2018-12-03 PROCEDURE — 80053 COMPREHEN METABOLIC PANEL: CPT | Performed by: NURSE PRACTITIONER

## 2018-12-03 PROCEDURE — 36415 COLL VENOUS BLD VENIPUNCTURE: CPT | Performed by: NURSE PRACTITIONER

## 2018-12-05 NOTE — PROGRESS NOTES
Ekaterina Rae,    Attached are your test results.  -Cholesterol levels are at your goal levels.  ADVISE: continuing your medication, a regular exercise program with at least 150 minutes of aerobic exercise per week, and eating a low saturated fat/low carbohydrate diet.  Also, you should recheck this fasting cholesterol panel in 12 months.  -Liver and gallbladder tests are normal (ALT,AST, Alk phos, bilirubin), kidney function is normal (Cr, GFR), sodium is normal, potassium is normal, calcium is normal, glucose is elevated consistent with diabetes   -A1C (test of diabetes control the last 2-3 months) is at your goal. Please recheck your A1C test in 3 months.    Please contact us if you have any questions.    Melissa Liu, CNP

## 2018-12-06 ENCOUNTER — OFFICE VISIT (OUTPATIENT)
Dept: PEDIATRICS | Facility: CLINIC | Age: 43
End: 2018-12-06
Payer: COMMERCIAL

## 2018-12-06 VITALS
TEMPERATURE: 97.6 F | WEIGHT: 244.1 LBS | DIASTOLIC BLOOD PRESSURE: 90 MMHG | SYSTOLIC BLOOD PRESSURE: 120 MMHG | OXYGEN SATURATION: 97 % | HEART RATE: 82 BPM | BODY MASS INDEX: 39.4 KG/M2

## 2018-12-06 DIAGNOSIS — E78.5 HYPERLIPIDEMIA WITH TARGET LDL LESS THAN 100: ICD-10-CM

## 2018-12-06 DIAGNOSIS — E11.65 TYPE 2 DIABETES MELLITUS WITH HYPERGLYCEMIA, WITHOUT LONG-TERM CURRENT USE OF INSULIN (H): Primary | ICD-10-CM

## 2018-12-06 PROCEDURE — 99214 OFFICE O/P EST MOD 30 MIN: CPT | Performed by: NURSE PRACTITIONER

## 2018-12-06 RX ORDER — METFORMIN HCL 500 MG
TABLET, EXTENDED RELEASE 24 HR ORAL
Qty: 270 TABLET | Refills: 3 | Status: SHIPPED | OUTPATIENT
Start: 2018-12-06 | End: 2019-06-13

## 2018-12-06 RX ORDER — ATORVASTATIN CALCIUM 10 MG/1
10 TABLET, FILM COATED ORAL DAILY
Qty: 90 TABLET | Refills: 3 | Status: SHIPPED | OUTPATIENT
Start: 2018-12-06 | End: 2019-12-29
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
## 2018-12-06 NOTE — NURSING NOTE
"Chief Complaint   Patient presents with     Diabetes     6 month follow-up       Initial /90 (BP Location: Right arm, Patient Position: Sitting, Cuff Size: Adult Large)  Pulse 82  Temp 97.6  F (36.4  C) (Temporal)  Wt 244 lb 1.6 oz (110.7 kg)  SpO2 97%  BMI 39.4 kg/m2 Estimated body mass index is 39.4 kg/(m^2) as calculated from the following:    Height as of 8/23/18: 5' 6\" (1.676 m).    Weight as of this encounter: 244 lb 1.6 oz (110.7 kg).  Medication Reconciliation: complete      KHALIDA Kahn      "

## 2018-12-06 NOTE — MR AVS SNAPSHOT
After Visit Summary   12/6/2018    Jose Luis Rae    MRN: [id number]           Patient Information     Date Of Birth          1975        Visit Information        Provider Department      12/6/2018 3:30 PM Melissa Liu APRN CNP [facility]        Today's Diagnoses     Type 2 diabetes mellitus with hyperglycemia, without long-term current use of insulin (H)    -  1    Hyperlipidemia with target LDL less than 100          Care Instructions    PLAN:   1.   Symptomatic therapy suggested: increase metformin to 1000 mg in the AM and 500 mg in the pm.  2.  Orders Placed This Encounter   Medications     atorvastatin (LIPITOR) 10 MG tablet     Sig: Take 1 tablet (10 mg) by mouth daily     Dispense:  90 tablet     Refill:  3     metFORMIN (GLUCOPHAGE-XR) 500 MG 24 hr tablet     Sig: Take 2 in the morning and one in the evening     Dispense:  270 tablet     Refill:  3     3. Patient needs to follow up in if no improvement,or sooner if worsening of symptoms or other symptoms develop.  Follow up in 6 months.  Schedule physical exam   It was a pleasure seeing you today at the [facility]. Thank you for allowing us to care for you today. We truly hope we provided you with the excellent service you deserve. Please let us know if there is anything else we can do for you so we can be sure you are leaving completley satisfied with your care experience.       General information about your clinic   Clinic Hours Lab Hours (Appointments are required)   Mon-Thurs: 7:30 AM - 7 PM Mon-Thurs: 7:30 AM - 7 PM   Fri: 7:30 AM - 5 PM Fri: 7:30 AM - 5 PM        After Hours Nurse Advise & Appts:  [facility]: [phone]  [facility] On Call: to make appointments anytime: [phone] On Call Physician: call [phone] and answering service will page the on call physician.        For urgent appointments, please call [phone] and ask for the triage nurse  or your care team clinic nurse.  How to contact my care team:  [facility]: [email or web address]   Phone: [phone]   Fax: [phone]       [facility]:   Phone: [phone]  Hours: 8:00 AM - 6:00 PM  Medication Refills:  Call your pharmacy and they will forward the refill to us. Please allow 3 business days for your refills to be completed.       Normal or non-critical lab and imaging results will be communicated to you by MyChart, letter or phone within 7 days.  If you do not hear from us within 10 days, please call the clinic. If you have a critical or abnormal lab result, we will notify you by phone as soon as possible.       We now have PWIC (Pediatric Walk in Care)  Monday-Friday from 7:30-4. Simply walk in and be seen for your urgent needs like cough, fever, rash, diarrhea or vomiting, pink eye, UTI. No appointments needed. Ask one of the team for more information      -Your Care Team:    Dr. Jeff Doyle - Internal Medicine/Pediatrics   Dr. Jacqueline Song - Family Medicine  Dr. Elizabeth Hayes - Pediatrics  Dr. Sharmin Delgado - Pediatrics  Melissa Liu CNP - Family Practice Nurse Practitioner                         Follow-ups after your visit        Who to contact     If you have questions or need follow up information about today's clinic visit or your schedule please contact [facility] directly at [phone].  Normal or non-critical lab and imaging results will be communicated to you by MyChart, letter or phone within 4 business days after the clinic has received the results. If you do not hear from us within 7 days, please contact the clinic through [facility] or phone. If you have a critical or abnormal lab result, we will notify you by phone as soon as possible.  Submit refill requests through [facility] or call your pharmacy and they will forward the refill request to us. Please allow 3 business days for your refill to be completed.          Additional Information About Your  Visit        [facility] Information     [facility] gives you secure access to your electronic health record. If you see a primary care provider, you can also send messages to your care team and make appointments. If you have questions, please call your primary care clinic.  If you do not have a primary care provider, please call [phone] and they will assist you.      [facility] is an electronic gateway that provides easy, online access to your medical records. With [facility], you can request a clinic appointment, read your test results, renew a prescription or communicate with your care team.     To access your existing account, please contact your [facility] or call [phone] for assistance.        Care EveryWhere ID     This is your Care EveryWhere ID. This could be used by other organizations to access your [facility] medical records  [id number]        Your Vitals Were     Pulse Temperature Pulse Oximetry BMI (Body Mass Index)          82 97.6  F (36.4  C) (Temporal) 97% 39.4 kg/m2         Blood Pressure from Last 3 Encounters:   12/06/18 120/90   08/23/18 126/87   06/12/18 122/90    Weight from Last 3 Encounters:   12/06/18 244 lb 1.6 oz (110.7 kg)   08/23/18 242 lb 6.4 oz (110 kg)   06/12/18 241 lb (109.3 kg)              Today, you had the following     No orders found for display         Today's Medication Changes          These changes are accurate as of 12/6/18  4:13 PM.  If you have any questions, ask your nurse or doctor.               These medicines have changed or have updated prescriptions.        Dose/Directions    metFORMIN 500 MG 24 hr tablet   Commonly known as:  GLUCOPHAGE-XR   This may have changed:    - how much to take  - how to take this  - when to take this  - additional instructions   Used for:  Type 2 diabetes mellitus with hyperglycemia, without long-term current use of insulin (H)   Changed by:  Melissa Liu APRN CNP        Take 2 in the morning and  one in the evening   Quantity:  270 tablet   Refills:  3            Where to get your medicines      These medications were sent to [facility] [place], [place] - [address] [facility]  [address] [facility] [address]     Phone:  [phone]     atorvastatin 10 MG tablet    metFORMIN 500 MG 24 hr tablet                Primary Care Provider Office Phone # Fax #    Melissa Liu, APRN [facility] [phone]       [address]  [place] [address]        Equal Access to Services     [facility]: Hadii aad ku hadasho Soomaali, waaxda luqadaha, qaybta kaalmada adeegyada, waxay idiin hayaan adejun kharatona dominguez . So [facility] [phone].    ATENCIÓN: Si habla español, tiene a castillo disposición servicios gratuitos de asistencia lingüística. [facility] [phone].    We comply with applicable federal civil rights laws and [place] laws. We do not discriminate on the basis of race, color, national origin, age, disability, sex, sexual orientation, or gender identity.            Thank you!     Thank you for choosing [facility]  for your care. Our goal is always to provide you with excellent care. Hearing back from our patients is one way we can continue to improve our services. Please take a few minutes to complete the written survey that you may receive in the mail after your visit with us. Thank you!             Your Updated Medication List - Protect others around you: Learn how to safely use, store and throw away your medicines at www.disposemymeds.org.          This list is accurate as of 12/6/18  4:13 PM.  Always use your most recent med list.                   Brand Name Dispense Instructions for use Diagnosis    atorvastatin 10 MG tablet    LIPITOR    90 tablet    Take 1 tablet (10 mg) by mouth daily    Hyperlipidemia with target LDL less than 100       blood glucose monitoring test strip    NO BRAND SPECIFIED    50 strip    Needs One Touch Verio test strip. Use to  test blood sugars 1-2 times daily or as directed    Diabetes mellitus, type 2 (H)       metFORMIN 500 MG 24 hr tablet    GLUCOPHAGE-XR    270 tablet    Take 2 in the morning and one in the evening    Type 2 diabetes mellitus with hyperglycemia, without long-term current use of insulin (H)

## 2018-12-06 NOTE — PATIENT INSTRUCTIONS
PLAN:   1.   Symptomatic therapy suggested: increase metformin to 1000 mg in the AM and 500 mg in the pm.  2.  Orders Placed This Encounter   Medications     atorvastatin (LIPITOR) 10 MG tablet     Sig: Take 1 tablet (10 mg) by mouth daily     Dispense:  90 tablet     Refill:  3     metFORMIN (GLUCOPHAGE-XR) 500 MG 24 hr tablet     Sig: Take 2 in the morning and one in the evening     Dispense:  270 tablet     Refill:  3     3. Patient needs to follow up in if no improvement,or sooner if worsening of symptoms or other symptoms develop.  Follow up in 6 months.  Schedule physical exam   It was a pleasure seeing you today at the [facility]. Thank you for allowing us to care for you today. We truly hope we provided you with the excellent service you deserve. Please let us know if there is anything else we can do for you so we can be sure you are leaving completley satisfied with your care experience.       General information about your clinic   Clinic Hours Lab Hours (Appointments are required)   Mon-Thurs: 7:30 AM - 7 PM Mon-Thurs: 7:30 AM - 7 PM   Fri: 7:30 AM - 5 PM Fri: 7:30 AM - 5 PM        After Hours Nurse Advise & Appts:  [facility]: [phone]  [facility] On Call: to make appointments anytime: [phone] On Call Physician: call [phone] and answering service will page the on call physician.        For urgent appointments, please call [phone] and ask for the triage nurse or your care team clinic nurse.  How to contact my care team:  [facility]: [email or web address]   Phone: [phone]   Fax: [phone]       [facility]:   Phone: [phone]  Hours: 8:00 AM - 6:00 PM  Medication Refills:  Call your pharmacy and they will forward the refill to us. Please allow 3 business days for your refills to be completed.       Normal or non-critical lab and imaging results will be communicated to you by MyChart, letter or phone within 7 days.  If you do not  hear from us within 10 days, please call the clinic. If you have a critical or abnormal lab result, we will notify you by phone as soon as possible.       We now have PWIC (Pediatric Walk in Care)  Monday-Friday from 7:30-4. Simply walk in and be seen for your urgent needs like cough, fever, rash, diarrhea or vomiting, pink eye, UTI. No appointments needed. Ask one of the team for more information      -Your Care Team:    Dr. Jeff Doyle - Internal Medicine/Pediatrics   Dr. Jacqueline Song - Family Medicine  Dr. Elizabeth Hayes - Pediatrics  Dr. Sharmin Delgado - Pediatrics  Melissa Liu CNP - Family Practice Nurse Practitioner

## 2019-05-28 ENCOUNTER — MYC REFILL (OUTPATIENT)
Dept: PEDIATRICS | Facility: CLINIC | Age: 44
End: 2019-05-28

## 2019-05-28 DIAGNOSIS — E78.5 HYPERLIPIDEMIA WITH TARGET LDL LESS THAN 100: ICD-10-CM

## 2019-05-28 DIAGNOSIS — E11.65 TYPE 2 DIABETES MELLITUS WITH HYPERGLYCEMIA, WITHOUT LONG-TERM CURRENT USE OF INSULIN (H): ICD-10-CM

## 2019-05-28 RX ORDER — METFORMIN HCL 500 MG
TABLET, EXTENDED RELEASE 24 HR ORAL
Qty: 270 TABLET | Refills: 3 | Status: CANCELLED | OUTPATIENT
Start: 2019-05-28

## 2019-05-28 RX ORDER — ATORVASTATIN CALCIUM 10 MG/1
10 TABLET, FILM COATED ORAL DAILY
Qty: 90 TABLET | Refills: 3 | Status: CANCELLED | OUTPATIENT
Start: 2019-05-28

## 2019-06-07 ENCOUNTER — DOCUMENTATION ONLY (OUTPATIENT)
Dept: LAB | Facility: CLINIC | Age: 44
End: 2019-06-07

## 2019-06-07 DIAGNOSIS — E78.5 HYPERLIPIDEMIA WITH TARGET LDL LESS THAN 100: ICD-10-CM

## 2019-06-07 DIAGNOSIS — E11.65 TYPE 2 DIABETES MELLITUS WITH HYPERGLYCEMIA, WITHOUT LONG-TERM CURRENT USE OF INSULIN (H): ICD-10-CM

## 2019-06-07 DIAGNOSIS — Z00.00 ENCOUNTER FOR ROUTINE ADULT HEALTH EXAMINATION WITHOUT ABNORMAL FINDINGS: Primary | ICD-10-CM

## 2019-06-10 DIAGNOSIS — E78.5 HYPERLIPIDEMIA WITH TARGET LDL LESS THAN 100: ICD-10-CM

## 2019-06-10 DIAGNOSIS — E11.65 TYPE 2 DIABETES MELLITUS WITH HYPERGLYCEMIA, WITHOUT LONG-TERM CURRENT USE OF INSULIN (H): ICD-10-CM

## 2019-06-10 DIAGNOSIS — Z00.00 ENCOUNTER FOR ROUTINE ADULT HEALTH EXAMINATION WITHOUT ABNORMAL FINDINGS: ICD-10-CM

## 2019-06-10 LAB
ALBUMIN SERPL-MCNC: 4.4 G/DL (ref 3.4–5)
ALP SERPL-CCNC: 112 U/L (ref 40–150)
ALT SERPL W P-5'-P-CCNC: 118 U/L (ref 0–70)
ANION GAP SERPL CALCULATED.3IONS-SCNC: 10 MMOL/L (ref 3–14)
AST SERPL W P-5'-P-CCNC: 42 U/L (ref 0–45)
BILIRUB SERPL-MCNC: 1.7 MG/DL (ref 0.2–1.3)
BUN SERPL-MCNC: 14 MG/DL (ref 7–30)
CALCIUM SERPL-MCNC: 9.1 MG/DL (ref 8.5–10.1)
CHLORIDE SERPL-SCNC: 105 MMOL/L (ref 94–109)
CHOLEST SERPL-MCNC: 157 MG/DL
CO2 SERPL-SCNC: 26 MMOL/L (ref 20–32)
CREAT SERPL-MCNC: 0.75 MG/DL (ref 0.66–1.25)
CREAT UR-MCNC: 330 MG/DL
GFR SERPL CREATININE-BSD FRML MDRD: >90 ML/MIN/{1.73_M2}
GLUCOSE SERPL-MCNC: 217 MG/DL (ref 70–99)
HBA1C MFR BLD: 9.1 % (ref 0–5.6)
HDLC SERPL-MCNC: 35 MG/DL
LDLC SERPL CALC-MCNC: 63 MG/DL
MICROALBUMIN UR-MCNC: 672 MG/L
MICROALBUMIN/CREAT UR: 203.64 MG/G CR (ref 0–17)
NONHDLC SERPL-MCNC: 122 MG/DL
POTASSIUM SERPL-SCNC: 3.7 MMOL/L (ref 3.4–5.3)
PROT SERPL-MCNC: 7.8 G/DL (ref 6.8–8.8)
SODIUM SERPL-SCNC: 141 MMOL/L (ref 133–144)
T4 FREE SERPL-MCNC: 1.09 NG/DL (ref 0.76–1.46)
TRIGL SERPL-MCNC: 293 MG/DL
TSH SERPL DL<=0.005 MIU/L-ACNC: 4.81 MU/L (ref 0.4–4)

## 2019-06-10 PROCEDURE — 83036 HEMOGLOBIN GLYCOSYLATED A1C: CPT | Performed by: NURSE PRACTITIONER

## 2019-06-10 PROCEDURE — 84443 ASSAY THYROID STIM HORMONE: CPT | Performed by: NURSE PRACTITIONER

## 2019-06-10 PROCEDURE — 82043 UR ALBUMIN QUANTITATIVE: CPT | Performed by: NURSE PRACTITIONER

## 2019-06-10 PROCEDURE — 80061 LIPID PANEL: CPT | Performed by: NURSE PRACTITIONER

## 2019-06-10 PROCEDURE — 80053 COMPREHEN METABOLIC PANEL: CPT | Performed by: NURSE PRACTITIONER

## 2019-06-10 PROCEDURE — 36415 COLL VENOUS BLD VENIPUNCTURE: CPT | Performed by: NURSE PRACTITIONER

## 2019-06-10 PROCEDURE — 84439 ASSAY OF FREE THYROXINE: CPT | Performed by: NURSE PRACTITIONER

## 2019-06-10 NOTE — RESULT ENCOUNTER NOTE
Ekaterina Rae,    Attached are your test results.  It looks like we are seeing you later this week  Your diabetes labs are very elevated   Not sure if you have been taking your metformin regularly   Spilling protein in your urine which can come from high blood pressure and uncontrolled diabetes   Will review all of this with you at your appointment    Please contact us if you have any questions.    Melissa Liu, CNP

## 2019-06-13 ENCOUNTER — OFFICE VISIT (OUTPATIENT)
Dept: PEDIATRICS | Facility: CLINIC | Age: 44
End: 2019-06-13
Payer: COMMERCIAL

## 2019-06-13 VITALS
OXYGEN SATURATION: 97 % | DIASTOLIC BLOOD PRESSURE: 90 MMHG | BODY MASS INDEX: 38.14 KG/M2 | WEIGHT: 237.3 LBS | SYSTOLIC BLOOD PRESSURE: 130 MMHG | HEART RATE: 96 BPM | HEIGHT: 66 IN | TEMPERATURE: 97.8 F

## 2019-06-13 DIAGNOSIS — G47.19 EXCESSIVE DAYTIME SLEEPINESS: ICD-10-CM

## 2019-06-13 DIAGNOSIS — R79.89 ELEVATED TSH: ICD-10-CM

## 2019-06-13 DIAGNOSIS — E11.65 TYPE 2 DIABETES MELLITUS WITH HYPERGLYCEMIA, WITHOUT LONG-TERM CURRENT USE OF INSULIN (H): ICD-10-CM

## 2019-06-13 DIAGNOSIS — Z00.00 ROUTINE GENERAL MEDICAL EXAMINATION AT A HEALTH CARE FACILITY: Primary | ICD-10-CM

## 2019-06-13 PROCEDURE — 99396 PREV VISIT EST AGE 40-64: CPT | Performed by: NURSE PRACTITIONER

## 2019-06-13 RX ORDER — METFORMIN HCL 500 MG
1000 TABLET, EXTENDED RELEASE 24 HR ORAL 2 TIMES DAILY WITH MEALS
Qty: 360 TABLET | Refills: 1 | Status: SHIPPED | OUTPATIENT
Start: 2019-06-13 | End: 2019-06-14

## 2019-06-13 ASSESSMENT — MIFFLIN-ST. JEOR: SCORE: 1919.14

## 2019-06-13 NOTE — PATIENT INSTRUCTIONS
PLAN:   1.   Symptomatic therapy suggested: increase metformin to 1000 mg twice a day .    2.  Orders Placed This Encounter   Medications     aspirin (ASA) 81 MG tablet     Sig: Take 81 mg by mouth daily     metFORMIN (GLUCOPHAGE-XR) 500 MG 24 hr tablet     Sig: Take 2 tablets (1,000 mg) by mouth 2 times daily (with meals) Take 2 in the morning and one in the evening     Dispense:  360 tablet     Refill:  1     Orders Placed This Encounter   Procedures     TSH with free T4 reflex     Basic metabolic panel     Hemoglobin A1c     SLEEP EVALUATION & MANAGEMENT REFERRAL - [facility] [phone] (Age 13 if over 100 lbs)       3. Patient needs to follow up in if no improvement,or sooner if worsening of symptoms or other symptoms develop.  CONSULTATION/REFERRAL to sleep center  Consider sleep study   FUTURE LABS:       - Schedule non-fasting labs in 3 months  Work on weight loss  Regular exercise  Follow up in 6 months.  Follow up office visit in one year for annual health maintenance exam, sooner PRN.    Preventive Health Recommendations  Male Ages 40 to 49    Yearly exam:             See your health care provider every year in order to  o   Review health changes.   o   Discuss preventive care.    o   Review your medicines if your doctor has prescribed any.    You should be tested each year for STDs (sexually transmitted diseases) if you re at risk.     Have a cholesterol test every 5 years.     Have a colonoscopy (test for colon cancer) if someone in your family has had colon cancer or polyps before age 50.     After age 45, have a diabetes test (fasting glucose). If you are at risk for diabetes, you should have this test every 3 years.      Talk with your health care provider about whether or not a prostate cancer screening test (PSA) is right for you.    Shots: Get a flu shot each year. Get a tetanus shot every 10 years.     Nutrition:    Eat at least 5 servings of fruits and vegetables  daily.     Eat whole-grain bread, whole-wheat pasta and brown rice instead of white grains and rice.     Get adequate Calcium and Vitamin D.     Lifestyle    Exercise for at least 150 minutes a week (30 minutes a day, 5 days a week). This will help you control your weight and prevent disease.     Limit alcohol to one drink per day.     No smoking.     Wear sunscreen to prevent skin cancer.     See your dentist every six months for an exam and cleaning.
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
## 2019-06-13 NOTE — NURSING NOTE
"Chief Complaint   Patient presents with     Physical     SY       Initial /90 (BP Location: Right arm, Patient Position: Sitting, Cuff Size: Adult Large)   Pulse 96   Temp 97.8  F (36.6  C) (Temporal)   Ht 1.676 m (5' 6\")   Wt 107.6 kg (237 lb 4.8 oz)   SpO2 97%   BMI 38.30 kg/m   Estimated body mass index is 38.3 kg/m  as calculated from the following:    Height as of this encounter: 1.676 m (5' 6\").    Weight as of this encounter: 107.6 kg (237 lb 4.8 oz).  Medication Reconciliation: complete      KHALIDA Kahn      "

## 2019-06-14 RX ORDER — METFORMIN HCL 500 MG
1000 TABLET, EXTENDED RELEASE 24 HR ORAL 2 TIMES DAILY WITH MEALS
Qty: 360 TABLET | Refills: 1 | Status: SHIPPED | OUTPATIENT
Start: 2019-06-14 | End: 2019-12-16

## 2019-07-18 ENCOUNTER — PATIENT OUTREACH (OUTPATIENT)
Dept: PEDIATRICS | Facility: CLINIC | Age: 44
End: 2019-07-18

## 2019-07-18 NOTE — PROGRESS NOTES
Clinic Care Coordination Contact    Situation: Patient chart reviewed by care coordinator.    Background: Diabetes Melltius Type II    Assessment: Patient with Diabetes Type II, flagged as out of control on diabetes MIPS report for:    Hemoglobin A1C   Date Value Ref Range Status   06/10/2019 9.1 (H) 0 - 5.6 % Final     Comment:     Normal <5.7% Prediabetes 5.7-6.4%  Diabetes 6.5% or higher - adopted from ADA   consensus guidelines.       Metformin increased to 1,000 mg twice daily (from 1,500 mg daily), advised to follow up in 6 months.      Has lab appointment scheduled for 9/9/2019 and 12/16/2019, with provider appt to follow on 12/16/19.      Plan/Recommendations: Chart review on 9/9/19 for lab result and recommendations.      Cynthia Hernández RN   RN Care Coordinator, Primary Care

## 2019-09-09 DIAGNOSIS — E11.65 TYPE 2 DIABETES MELLITUS WITH HYPERGLYCEMIA, WITHOUT LONG-TERM CURRENT USE OF INSULIN (H): ICD-10-CM

## 2019-09-09 DIAGNOSIS — R79.89 ELEVATED TSH: ICD-10-CM

## 2019-09-09 LAB
ANION GAP SERPL CALCULATED.3IONS-SCNC: 8 MMOL/L (ref 3–14)
BUN SERPL-MCNC: 14 MG/DL (ref 7–30)
CALCIUM SERPL-MCNC: 9 MG/DL (ref 8.5–10.1)
CHLORIDE SERPL-SCNC: 106 MMOL/L (ref 94–109)
CO2 SERPL-SCNC: 27 MMOL/L (ref 20–32)
CREAT SERPL-MCNC: 0.78 MG/DL (ref 0.66–1.25)
GFR SERPL CREATININE-BSD FRML MDRD: >90 ML/MIN/{1.73_M2}
GLUCOSE SERPL-MCNC: 154 MG/DL (ref 70–99)
HBA1C MFR BLD: 7.5 % (ref 0–5.6)
POTASSIUM SERPL-SCNC: 3.8 MMOL/L (ref 3.4–5.3)
SODIUM SERPL-SCNC: 141 MMOL/L (ref 133–144)
T4 FREE SERPL-MCNC: 0.95 NG/DL (ref 0.76–1.46)
TSH SERPL DL<=0.005 MIU/L-ACNC: 5.5 MU/L (ref 0.4–4)

## 2019-09-09 PROCEDURE — 80048 BASIC METABOLIC PNL TOTAL CA: CPT | Performed by: NURSE PRACTITIONER

## 2019-09-09 PROCEDURE — 84439 ASSAY OF FREE THYROXINE: CPT | Performed by: NURSE PRACTITIONER

## 2019-09-09 PROCEDURE — 36415 COLL VENOUS BLD VENIPUNCTURE: CPT | Performed by: NURSE PRACTITIONER

## 2019-09-09 PROCEDURE — 83036 HEMOGLOBIN GLYCOSYLATED A1C: CPT | Performed by: NURSE PRACTITIONER

## 2019-09-09 PROCEDURE — 84443 ASSAY THYROID STIM HORMONE: CPT | Performed by: NURSE PRACTITIONER

## 2019-09-10 DIAGNOSIS — E11.65 TYPE 2 DIABETES MELLITUS WITH HYPERGLYCEMIA, WITHOUT LONG-TERM CURRENT USE OF INSULIN (H): Primary | ICD-10-CM

## 2019-09-10 DIAGNOSIS — R79.89 ELEVATED TSH: ICD-10-CM

## 2019-09-11 NOTE — PROGRESS NOTES
Hemoglobin A1C   Date Value Ref Range Status   09/09/2019 7.5 (H) 0 - 5.6 % Final     Comment:     Normal <5.7% Prediabetes 5.7-6.4%  Diabetes 6.5% or higher - adopted from ADA   consensus guidelines.       Patient advised to continue on current medications unchanged and follow up in 3 months.  Appointments are scheduled for 12/16/2019 for lab and with provider.    Cynthia Hernández RN   RN Care Coordinator, Primary Care

## 2019-09-11 NOTE — RESULT ENCOUNTER NOTE
Ekaterina Rae,    Attached are your test results.  -Kidney function (GFR) is normal.  -Sodium is normal.  -Potassium is normal.  -Calcium is normal.  -Glucose is elevated due to your diabetes.  -A1C (test of diabetes control the last 2-3 months) is much better and closer to  your goal. Please recheck your A1C test in 3 months and follow up appointment . Continue current medication regimen unchanged.    -TSH (thyroid stimulating hormone) level  Can indicate hypothyroidism (an underactive thyroid).   ADVISE:rechecking your TSH in 3  Month and will add antibodies as well.    Please contact us if you have any questions.    Melissa Liu, CNP

## 2019-12-16 ENCOUNTER — OFFICE VISIT (OUTPATIENT)
Dept: PEDIATRICS | Facility: CLINIC | Age: 44
End: 2019-12-16
Payer: COMMERCIAL

## 2019-12-16 VITALS
TEMPERATURE: 97.8 F | HEART RATE: 100 BPM | OXYGEN SATURATION: 97 % | SYSTOLIC BLOOD PRESSURE: 120 MMHG | DIASTOLIC BLOOD PRESSURE: 80 MMHG | BODY MASS INDEX: 39.74 KG/M2 | WEIGHT: 246.2 LBS

## 2019-12-16 DIAGNOSIS — R79.89 ELEVATED TSH: ICD-10-CM

## 2019-12-16 DIAGNOSIS — R79.89 ABNORMAL LIVER FUNCTION TESTS: ICD-10-CM

## 2019-12-16 DIAGNOSIS — E11.65 TYPE 2 DIABETES MELLITUS WITH HYPERGLYCEMIA, WITHOUT LONG-TERM CURRENT USE OF INSULIN (H): ICD-10-CM

## 2019-12-16 DIAGNOSIS — E11.65 TYPE 2 DIABETES MELLITUS WITH HYPERGLYCEMIA, WITHOUT LONG-TERM CURRENT USE OF INSULIN (H): Primary | ICD-10-CM

## 2019-12-16 DIAGNOSIS — E66.01 MORBID OBESITY (H): ICD-10-CM

## 2019-12-16 LAB
ALBUMIN SERPL-MCNC: 4.1 G/DL (ref 3.4–5)
ALP SERPL-CCNC: 85 U/L (ref 40–150)
ALT SERPL W P-5'-P-CCNC: 151 U/L (ref 0–70)
ANION GAP SERPL CALCULATED.3IONS-SCNC: 6 MMOL/L (ref 3–14)
AST SERPL W P-5'-P-CCNC: 62 U/L (ref 0–45)
BILIRUB DIRECT SERPL-MCNC: 0.2 MG/DL (ref 0–0.2)
BILIRUB SERPL-MCNC: 1.1 MG/DL (ref 0.2–1.3)
BUN SERPL-MCNC: 15 MG/DL (ref 7–30)
CALCIUM SERPL-MCNC: 9.1 MG/DL (ref 8.5–10.1)
CHLORIDE SERPL-SCNC: 106 MMOL/L (ref 94–109)
CO2 SERPL-SCNC: 27 MMOL/L (ref 20–32)
CREAT SERPL-MCNC: 0.78 MG/DL (ref 0.66–1.25)
GFR SERPL CREATININE-BSD FRML MDRD: >90 ML/MIN/{1.73_M2}
GLUCOSE SERPL-MCNC: 180 MG/DL (ref 70–99)
HBA1C MFR BLD: 7.8 % (ref 0–5.6)
POTASSIUM SERPL-SCNC: 4 MMOL/L (ref 3.4–5.3)
PROT SERPL-MCNC: 7.5 G/DL (ref 6.8–8.8)
SODIUM SERPL-SCNC: 139 MMOL/L (ref 133–144)
T4 FREE SERPL-MCNC: 0.94 NG/DL (ref 0.76–1.46)
THYROPEROXIDASE AB SERPL-ACNC: 29 IU/ML
TSH SERPL DL<=0.005 MIU/L-ACNC: 4.33 MU/L (ref 0.4–4)

## 2019-12-16 PROCEDURE — 86376 MICROSOMAL ANTIBODY EACH: CPT | Performed by: NURSE PRACTITIONER

## 2019-12-16 PROCEDURE — 83036 HEMOGLOBIN GLYCOSYLATED A1C: CPT | Performed by: NURSE PRACTITIONER

## 2019-12-16 PROCEDURE — 84439 ASSAY OF FREE THYROXINE: CPT | Performed by: NURSE PRACTITIONER

## 2019-12-16 PROCEDURE — 36415 COLL VENOUS BLD VENIPUNCTURE: CPT | Performed by: NURSE PRACTITIONER

## 2019-12-16 PROCEDURE — 84443 ASSAY THYROID STIM HORMONE: CPT | Performed by: NURSE PRACTITIONER

## 2019-12-16 PROCEDURE — 80076 HEPATIC FUNCTION PANEL: CPT | Performed by: NURSE PRACTITIONER

## 2019-12-16 PROCEDURE — 99214 OFFICE O/P EST MOD 30 MIN: CPT | Performed by: NURSE PRACTITIONER

## 2019-12-16 PROCEDURE — 80048 BASIC METABOLIC PNL TOTAL CA: CPT | Performed by: NURSE PRACTITIONER

## 2019-12-16 RX ORDER — METFORMIN HCL 500 MG
1000 TABLET, EXTENDED RELEASE 24 HR ORAL 2 TIMES DAILY WITH MEALS
Qty: 360 TABLET | Refills: 1 | Status: SHIPPED | OUTPATIENT
Start: 2019-12-16 | End: 2020-06-18

## 2019-12-16 NOTE — NURSING NOTE
"Chief Complaint   Patient presents with     Diabetes       Initial /80 (BP Location: Right arm, Patient Position: Sitting, Cuff Size: Adult Large)   Pulse 100   Temp 97.8  F (36.6  C) (Temporal)   Wt 111.7 kg (246 lb 3.2 oz)   SpO2 97%   BMI 39.74 kg/m   Estimated body mass index is 39.74 kg/m  as calculated from the following:    Height as of 6/13/19: 1.676 m (5' 6\").    Weight as of this encounter: 111.7 kg (246 lb 3.2 oz).  Medication Reconciliation: complete      KHALIDA Kahn      "

## 2019-12-16 NOTE — PATIENT INSTRUCTIONS
PLAN:   1.   Symptomatic therapy suggested: Continue current medication regimen unchanged.  2.  Orders Placed This Encounter   Medications     metFORMIN (GLUCOPHAGE-XR) 500 MG 24 hr tablet     Sig: Take 2 tablets (1,000 mg) by mouth 2 times daily (with meals)     Dispense:  360 tablet     Refill:  1     Orders Placed This Encounter   Procedures     Hepatic panel     Basic metabolic panel       3. Patient needs to follow up in if no improvement,or sooner if worsening of symptoms or other symptoms develop.  FUTURE LABS:       - Schedule non-fasting labs in 3 months  Work on weight loss  Regular exercise  Follow up in 6 months as physical after June 13   Will follow up and/or notify patient of  results via My Chart to determine further need for followup

## 2019-12-16 NOTE — PROGRESS NOTES
Subjective     Jose Luis Rae is a 44 year old male who presents to clinic today for the following health issues:    HPI   Diabetes Follow-up    How often are you checking your blood sugar? A few times a month  What time of day are you checking your blood sugars (select all that apply)?  Before meals  Have you had any blood sugars above 200?  No  Have you had any blood sugars below 70?  No    What symptoms do you notice when your blood sugar is low?  None    What concerns do you have today about your diabetes? None     Do you have any of these symptoms? (Select all that apply)  No numbness or tingling in feet.  No redness, sores or blisters on feet.  No complaints of excessive thirst.  No reports of blurry vision.  No significant changes to weight.     Have you had a diabetic eye exam in the last 12 months? No    BP Readings from Last 2 Encounters:   12/16/19 120/80   06/13/19 130/90     Hemoglobin A1C (%)   Date Value   12/16/2019 7.8 (H)   09/09/2019 7.5 (H)     LDL Cholesterol Calculated (mg/dL)   Date Value   06/10/2019 63   12/03/2018 64       Diabetes Management Resources      How many servings of fruits and vegetables do you eat daily?  2-3    On average, how many sweetened beverages do you drink each day (Examples: soda, juice, sweet tea, etc.  Do NOT count diet or artificially sweetened beverages)?   0    How many days per week do you miss taking your medication? 0      Patient Active Problem List   Diagnosis     Abnormal liver function tests     Hypertriglyceridemia     Overweight     Type 2 diabetes mellitus with hyperglycemia, without long-term current use of insulin (H)     Kidney stone     Hepatomegaly     Hyperlipidemia with target LDL less than 100     Past Surgical History:   Procedure Laterality Date     ABDOMEN SURGERY  6/20/17    shockwave     ADENOIDECTOMY       EXTRACORPOREAL SHOCK WAVE LITHOTRIPSY (ESWL) Left 6/20/2017    Procedure: EXTRACORPOREAL SHOCK WAVE LITHOTRIPSY (ESWL);  Left  Extracorporal Shockwave Lithotripsy;  Surgeon: Ludwig Vasquez MD;  Location:  OR       Social History     Tobacco Use     Smoking status: Never Smoker     Smokeless tobacco: Never Used   Substance Use Topics     Alcohol use: Yes     Comment: maybe once a week      Family History   Problem Relation Age of Onset     Diabetes Maternal Grandfather      Coronary Artery Disease Maternal Grandfather      Hypertension Maternal Grandfather      Hyperlipidemia Maternal Grandfather      Lung Cancer Maternal Grandfather      Obesity Maternal Grandfather      Thyroid Disease Mother      Hyperlipidemia Mother      Hyperlipidemia Father      Osteoporosis Maternal Grandmother      Cerebrovascular Disease No family hx of      Breast Cancer No family hx of      Colon Cancer No family hx of      Prostate Cancer No family hx of      Other Cancer No family hx of      Depression No family hx of      Anxiety Disorder No family hx of      Mental Illness No family hx of      Substance Abuse No family hx of      Anesthesia Reaction No family hx of      Asthma No family hx of      Genetic Disorder No family hx of      Unknown/Adopted No family hx of          Current Outpatient Medications   Medication Sig Dispense Refill     aspirin (ASA) 81 MG tablet Take 81 mg by mouth daily       atorvastatin (LIPITOR) 10 MG tablet Take 1 tablet (10 mg) by mouth daily 90 tablet 3     blood glucose monitoring (NO BRAND SPECIFIED) test strip Needs One Touch Verio test strip. Use to test blood sugars 1-2 times daily or as directed 50 strip 3     metFORMIN (GLUCOPHAGE-XR) 500 MG 24 hr tablet Take 2 tablets (1,000 mg) by mouth 2 times daily (with meals) 360 tablet 1     Allergies   Allergen Reactions     Tdap [Daptacel] Muscle Pain (Myalgia)     Recent Labs   Lab Test 12/16/19  0704 09/09/19  0705 06/10/19  0707 12/03/18  0701 06/11/18  0703  09/11/17  0838   A1C 7.8* 7.5* 9.1* 7.1* 7.1*   < > 6.6*   LDL  --   --  63 64  --   --  121*   HDL  --   --   35* 35*  --   --  34*   TRIG  --   --  293* 227*  --   --  290*   ALT  --   --  118* 66 140*   < > 110*   CR  --  0.78 0.75 0.78 0.86   < > 0.80   GFRESTIMATED  --  >90 >90 >90 >90   < > >90   GFRESTBLACK  --  >90 >90 >90 >90   < > >90   POTASSIUM  --  3.8 3.7 3.9 3.6   < > 4.0   TSH 4.33* 5.50* 4.81*  --   --   --   --     < > = values in this interval not displayed.      BP Readings from Last 3 Encounters:   12/16/19 120/80   06/13/19 130/90   12/06/18 120/90    Wt Readings from Last 3 Encounters:   12/16/19 111.7 kg (246 lb 3.2 oz)   06/13/19 107.6 kg (237 lb 4.8 oz)   12/06/18 110.7 kg (244 lb 1.6 oz)           Reviewed and updated as needed this visit by Provider         Review of Systems   ROS COMP: CONSTITUTIONAL:POSITIVE  for fatigue. During the week has a hard time getting enough sleep.  and NEGATIVE  for anorexia and sweats  ENT/MOUTH: NEGATIVE for ear, mouth and throat problems  RESP:NEGATIVE for significant cough or SOB  CV: NEGATIVE for chest pain, palpitations or peripheral edema  GI: NEGATIVE for nausea, abdominal pain, heartburn, or change in bowel habits  MUSCULOSKELETAL: NEGATIVE for significant arthralgias or myalgia  NEURO: NEGATIVE for weakness, dizziness or paresthesias  ENDOCRINE: POSITIVE  for HX diabetes and NEGATIVE for polydipsia, polyphagia and polyuria      Objective    /80 (BP Location: Right arm, Patient Position: Sitting, Cuff Size: Adult Large)   Pulse 100   Temp 97.8  F (36.6  C) (Temporal)   Wt 111.7 kg (246 lb 3.2 oz)   SpO2 97%   BMI 39.74 kg/m    Body mass index is 39.74 kg/m .   Wt Readings from Last 4 Encounters:   12/16/19 111.7 kg (246 lb 3.2 oz)   06/13/19 107.6 kg (237 lb 4.8 oz)   12/06/18 110.7 kg (244 lb 1.6 oz)   08/23/18 110 kg (242 lb 6.4 oz)       Physical Exam   GENERAL: Patient is well nourished, well developed, obese,in no apparent distress, non-toxic, in no respiratory distress and acyanotic, alert, cooperative and well hydrated  EYES: Eyes grossly  normal to inspection and conjunctivae and sclerae normal  HENT:ear canals and TM's normal and nose and mouth without ulcers or lesions   NECK:normal and supple  CARDIAC:regular rates and rhythm, no murmur, click or rub and no irregular beats  without LE edema bilaterally  RESP: normal respiratory rate and rhythm, lungs clear to auscultation  unlabored respirations, no intercostal retractions or accessory muscle use  ABD:soft, nontender, without hepatosplenomegaly or masses  SKIN: Skin color, texture, turgor normal. No rashes or lesions.  MS: extremities normal- no gross deformities noted, gait normal and normal muscle tone  NEURO: mentation intact and speech normal  PSYCH: Alert, oriented, thought content appropriate,mentation appears normal., affect and mood normal      Diagnostic Test Results:  Results for orders placed or performed in visit on 12/16/19   Hemoglobin A1c     Status: Abnormal   Result Value Ref Range    Hemoglobin A1C 7.8 (H) 0 - 5.6 %   Thyroid peroxidase antibody     Status: None   Result Value Ref Range    Thyroid Peroxidase Antibody 29 <35 IU/mL   TSH     Status: Abnormal   Result Value Ref Range    TSH 4.33 (H) 0.40 - 4.00 mU/L   T4 free     Status: None   Result Value Ref Range    T4 Free 0.94 0.76 - 1.46 ng/dL           Assessment & Plan     Jose Luis was seen today for diabetes.    Diagnoses and all orders for this visit:    Type 2 diabetes mellitus with hyperglycemia, without long-term current use of insulin (H)  -     Basic metabolic panel  -     metFORMIN (GLUCOPHAGE-XR) 500 MG 24 hr tablet; Take 2 tablets (1,000 mg) by mouth 2 times daily (with meals)  -     Hemoglobin A1c; Future  foot care discussed and Podiatry visits discussed, annual eye examinations at Ophthalmology discussed, glycohemoglobin monitoring discussed and long term diabetic complications discussed  No changes in current regimen  Attempt to improve diet  Weight loss advised  Increased exercise advised   Recheck in 3  "months, sooner should new symptoms or   problems arise.    Abnormal liver function tests  -     Hepatic panel  -     Comprehensive metabolic panel; Future  Will follow up and/or notify patient of  results via My Chart to determine further need for followup      Morbid obesity (H)  Healthy diet and exercise reviewed.  Limit pop and juice intake.  Risks of obesity discussed.  Encourage exercise.  Limit TV/Computer/game time to one hour a day.        See Patient Instructions  Patient Instructions     PLAN:   1.   Symptomatic therapy suggested: Continue current medication regimen unchanged.  2.  Orders Placed This Encounter   Medications     metFORMIN (GLUCOPHAGE-XR) 500 MG 24 hr tablet     Sig: Take 2 tablets (1,000 mg) by mouth 2 times daily (with meals)     Dispense:  360 tablet     Refill:  1     Orders Placed This Encounter   Procedures     Hepatic panel     Basic metabolic panel       3. Patient needs to follow up in if no improvement,or sooner if worsening of symptoms or other symptoms develop.  FUTURE LABS:       - Schedule non-fasting labs in 3 months  Work on weight loss  Regular exercise  Follow up in 6 months as physical after June 13   Will follow up and/or notify patient of  results via My Chart to determine further need for followup    BMI:   Estimated body mass index is 39.74 kg/m  as calculated from the following:    Height as of 6/13/19: 1.676 m (5' 6\").    Weight as of this encounter: 111.7 kg (246 lb 3.2 oz).   Weight management plan: Discussed healthy diet and exercise guidelines    No follow-ups on file.    LOUIE Salcedo CNP  [facility]      "

## 2019-12-18 ENCOUNTER — PATIENT OUTREACH (OUTPATIENT)
Dept: PEDIATRICS | Facility: CLINIC | Age: 44
End: 2019-12-18

## 2019-12-18 NOTE — RESULT ENCOUNTER NOTE
Ekaterina Rae,    Attached are your test results.  -Liver and gallbladder tests (ALT,AST, Alk phos,bilirubin) are significantly elevated.  Continue to work on diet and weight loss. Like related to fatty liver.  ADVISE: further testing - recheck in 3 months  -Kidney function (GFR) is normal.  -Sodium is normal.  -Potassium is normal.  -Calcium is normal.  -Glucose is elevated due to your diabetes.   Please contact us if you have any questions.    Melissa Liu, CNP

## 2019-12-18 NOTE — PROGRESS NOTES
[facility]:  Care Coordination Note     SITUATION     Jose Luis Rae is a 44 year old male who is receiving support for:  Clinic Care Coordination - Follow-up (diabetes review)    BACKGROUND     Follow up from 7/18/19 outreach encounter, 9/11/19 addendum.    ASSESSMENT     Patient had significant improvement in Hgba1c with increase in Metformin dose from 1500 mg daily to 1,000 mg twice daily dosing in June 2019 as follows:    Hemoglobin A1C   Date Value Ref Range Status   12/16/2019 7.8 (H) 0 - 5.6 % Final     Comment:     Normal <5.7% Prediabetes 5.7-6.4%  Diabetes 6.5% or higher - adopted from ADA   consensus guidelines.     09/09/2019 7.5 (H) 0 - 5.6 % Final     Comment:     Normal <5.7% Prediabetes 5.7-6.4%  Diabetes 6.5% or higher - adopted from ADA   consensus guidelines.     06/10/2019 9.1 (H) 0 - 5.6 % Final     Comment:     Normal <5.7% Prediabetes 5.7-6.4%  Diabetes 6.5% or higher - adopted from ADA   consensus guidelines.       Patient advised to work on diet, weight loss and exercise and return in 3 months for non-fasting labs to recheck liver function (elevations noted) and Hgba1c.      Patient has appointments scheduled for 3/23/19 with lab and 6/18/19 for lab and Physical exam with LOUIE Guillaume CNP.      PLAN       Nursing Interventions:   Chart review.  Patient appears to be compliant with recommendations and follow up.    Follow-up plan:  Will monitor after lab appointment in March.  If Hgba1c remains controlled and no further needs are noted, will likely discontinue following.       Cynthia Hernández RN   RN Care Coordinator, Primary Care

## 2019-12-29 ENCOUNTER — MYC REFILL (OUTPATIENT)
Dept: PEDIATRICS | Facility: CLINIC | Age: 44
End: 2019-12-29

## 2019-12-29 DIAGNOSIS — E78.5 HYPERLIPIDEMIA WITH TARGET LDL LESS THAN 100: ICD-10-CM

## 2019-12-30 RX ORDER — ATORVASTATIN CALCIUM 10 MG/1
10 TABLET, FILM COATED ORAL DAILY
Qty: 90 TABLET | Refills: 1 | Status: SHIPPED | OUTPATIENT
Start: 2019-12-30 | End: 2020-06-18

## 2020-01-01 PROBLEM — E66.01 MORBID OBESITY (H): Status: ACTIVE | Noted: 2020-01-01

## 2020-03-10 ENCOUNTER — HEALTH MAINTENANCE LETTER (OUTPATIENT)
Age: 45
End: 2020-03-10

## 2020-03-20 ENCOUNTER — TELEPHONE (OUTPATIENT)
Dept: PEDIATRICS | Facility: CLINIC | Age: 45
End: 2020-03-20

## 2020-03-20 NOTE — TELEPHONE ENCOUNTER
Lab called pt is  scheduled to come in for labs Monday. Lab would like to know if this is necessary or can be rescheduled? Please advise. Mamie BLAS CMA

## 2020-03-23 DIAGNOSIS — R79.89 ABNORMAL LIVER FUNCTION TESTS: ICD-10-CM

## 2020-03-23 DIAGNOSIS — E11.65 TYPE 2 DIABETES MELLITUS WITH HYPERGLYCEMIA, WITHOUT LONG-TERM CURRENT USE OF INSULIN (H): ICD-10-CM

## 2020-03-23 LAB
ALBUMIN SERPL-MCNC: 4.3 G/DL (ref 3.4–5)
ALP SERPL-CCNC: 91 U/L (ref 40–150)
ALT SERPL W P-5'-P-CCNC: 118 U/L (ref 0–70)
ANION GAP SERPL CALCULATED.3IONS-SCNC: 8 MMOL/L (ref 3–14)
AST SERPL W P-5'-P-CCNC: 46 U/L (ref 0–45)
BILIRUB SERPL-MCNC: 0.9 MG/DL (ref 0.2–1.3)
BUN SERPL-MCNC: 19 MG/DL (ref 7–30)
CALCIUM SERPL-MCNC: 9.3 MG/DL (ref 8.5–10.1)
CHLORIDE SERPL-SCNC: 106 MMOL/L (ref 94–109)
CO2 SERPL-SCNC: 25 MMOL/L (ref 20–32)
CREAT SERPL-MCNC: 0.73 MG/DL (ref 0.66–1.25)
GFR SERPL CREATININE-BSD FRML MDRD: >90 ML/MIN/{1.73_M2}
GLUCOSE SERPL-MCNC: 186 MG/DL (ref 70–99)
HBA1C MFR BLD: 8.3 % (ref 0–5.6)
POTASSIUM SERPL-SCNC: 3.9 MMOL/L (ref 3.4–5.3)
PROT SERPL-MCNC: 7.7 G/DL (ref 6.8–8.8)
SODIUM SERPL-SCNC: 139 MMOL/L (ref 133–144)

## 2020-03-23 PROCEDURE — 36415 COLL VENOUS BLD VENIPUNCTURE: CPT | Performed by: NURSE PRACTITIONER

## 2020-03-23 PROCEDURE — 83036 HEMOGLOBIN GLYCOSYLATED A1C: CPT | Performed by: NURSE PRACTITIONER

## 2020-03-23 PROCEDURE — 80053 COMPREHEN METABOLIC PANEL: CPT | Performed by: NURSE PRACTITIONER

## 2020-03-24 DIAGNOSIS — R79.89 ABNORMAL LIVER FUNCTION TESTS: ICD-10-CM

## 2020-03-24 DIAGNOSIS — E11.65 TYPE 2 DIABETES MELLITUS WITH HYPERGLYCEMIA, WITHOUT LONG-TERM CURRENT USE OF INSULIN (H): Primary | ICD-10-CM

## 2020-03-25 ENCOUNTER — PATIENT OUTREACH (OUTPATIENT)
Dept: PEDIATRICS | Facility: CLINIC | Age: 45
End: 2020-03-25

## 2020-03-25 DIAGNOSIS — E11.65 TYPE 2 DIABETES MELLITUS WITH HYPERGLYCEMIA, WITHOUT LONG-TERM CURRENT USE OF INSULIN (H): Primary | ICD-10-CM

## 2020-03-25 NOTE — RESULT ENCOUNTER NOTE
Ekaterina Rae,    Attached are your test results.  -Liver and gallbladder tests (ALT,AST, Alk phos,bilirubin) are significantly elevated. ADVISE: further testing - slightly improved  -Kidney function (GFR) is normal.  -Sodium is normal.  -Potassium is normal.  -Calcium is normal.  -Glucose is elevated due to your diabetes.  -A1C (test of diabetes control the last 2-3 months) is above  your goal.  Would like to try add on to  you on something like trulicity which would only be an injection once a week. This will help with your diabetes and weight as well   I will try sent in a script for you    Please recheck your A1C test in 3 months.    Please contact us if you have any questions.    Melissa Liu, CNP

## 2020-03-25 NOTE — PROGRESS NOTES
[facility]:  Care Coordination Note     SITUATION     Jose Luis Rae is a 45 year old male who is receiving support for:  Clinic Care Coordination - Follow-up    BACKGROUND     Lab completed on 3/23/2020, Hgba1c was 8.3, up from previous result of 7.8 on 12/16/2019.  LOUIE Guillaume CNP prescribed Trulicity.      ASSESSMENT     Patient contacted, has not yet picked up Trulicity.   Discussed that the medication is usually injected into the abdomen, advised to rotate injection sites.   Patient has not used injectables previously.  Patient advised if there is any barrier to obtaining the medication, to please contact the clinic.  Also encouraged to return call if there are any questions.    PLAN        Follow-up plan:  Outreach in 2 weeks to see how patient is tolerating the medication.       Cynthia Hernández RN   RN Care Coordinator, Primary Care

## 2020-03-26 ENCOUNTER — MYC MEDICAL ADVICE (OUTPATIENT)
Dept: PEDIATRICS | Facility: CLINIC | Age: 45
End: 2020-03-26

## 2020-03-26 DIAGNOSIS — E11.65 TYPE 2 DIABETES MELLITUS WITH HYPERGLYCEMIA, WITHOUT LONG-TERM CURRENT USE OF INSULIN (H): ICD-10-CM

## 2020-03-26 RX ORDER — LIRAGLUTIDE 6 MG/ML
INJECTION SUBCUTANEOUS
Qty: 17.3 ML | Refills: 1 | Status: SHIPPED | OUTPATIENT
Start: 2020-03-26 | End: 2020-03-30

## 2020-03-30 RX ORDER — GLIMEPIRIDE 2 MG/1
2 TABLET ORAL
Qty: 90 TABLET | Refills: 1 | Status: SHIPPED | OUTPATIENT
Start: 2020-03-30 | End: 2020-06-18

## 2020-03-30 NOTE — TELEPHONE ENCOUNTER
PRIOR AUTHORIZATION DENIED    Medication: Victoza 18mg/3ml pen- DENIED    Denial Date: 3/30/2020    Denial Rational:           Appeal Information:

## 2020-03-30 NOTE — TELEPHONE ENCOUNTER
Central Prior Authorization Team   Phone: [phone]      PA Initiation    Medication: Victoza 18mg/3ml pen  Insurance Company: [employer] - Phone [phone] Fax [phone]  Pharmacy Filling the Rx: [facility] [address] [place] [id number] [facility]  Filling Pharmacy Phone: [phone]  Filling Pharmacy Fax:    Start Date: 3/30/2020

## 2020-03-30 NOTE — TELEPHONE ENCOUNTER
Pharmacy has started a Prior Authorization request via Cover My Meds for liraglutide (VICTOZA) 18 MG/3ML solution, (Key: [id number])

## 2020-03-30 NOTE — TELEPHONE ENCOUNTER
[facility] Call Center    Phone Message    May a detailed message be left on voicemail: yes     Reason for Call: Medication Question or concern regarding medication   Patient needing auth for victoza. Insurance states provider must locate form to process this request. [facility] # [phone]    Action Taken: Message routed to:  Primary Care p [phone]    Travel Screening: Not Applicable

## 2020-06-18 ENCOUNTER — OFFICE VISIT (OUTPATIENT)
Dept: PEDIATRICS | Facility: CLINIC | Age: 45
End: 2020-06-18
Payer: COMMERCIAL

## 2020-06-18 VITALS
OXYGEN SATURATION: 97 % | SYSTOLIC BLOOD PRESSURE: 120 MMHG | DIASTOLIC BLOOD PRESSURE: 70 MMHG | WEIGHT: 243.8 LBS | HEIGHT: 66 IN | TEMPERATURE: 97.7 F | HEART RATE: 93 BPM | BODY MASS INDEX: 39.18 KG/M2

## 2020-06-18 DIAGNOSIS — E11.65 TYPE 2 DIABETES MELLITUS WITH HYPERGLYCEMIA, WITHOUT LONG-TERM CURRENT USE OF INSULIN (H): ICD-10-CM

## 2020-06-18 DIAGNOSIS — E78.5 HYPERLIPIDEMIA WITH TARGET LDL LESS THAN 100: ICD-10-CM

## 2020-06-18 DIAGNOSIS — R79.89 ABNORMAL LIVER FUNCTION TESTS: ICD-10-CM

## 2020-06-18 DIAGNOSIS — Z00.00 ROUTINE GENERAL MEDICAL EXAMINATION AT A HEALTH CARE FACILITY: Primary | ICD-10-CM

## 2020-06-18 DIAGNOSIS — Z13.29 SCREENING FOR THYROID DISORDER: ICD-10-CM

## 2020-06-18 LAB
ALBUMIN SERPL-MCNC: 4.1 G/DL (ref 3.4–5)
ALP SERPL-CCNC: 86 U/L (ref 40–150)
ALT SERPL W P-5'-P-CCNC: 99 U/L (ref 0–70)
ANION GAP SERPL CALCULATED.3IONS-SCNC: 5 MMOL/L (ref 3–14)
AST SERPL W P-5'-P-CCNC: 46 U/L (ref 0–45)
BILIRUB SERPL-MCNC: 1.1 MG/DL (ref 0.2–1.3)
BUN SERPL-MCNC: 16 MG/DL (ref 7–30)
CALCIUM SERPL-MCNC: 8.7 MG/DL (ref 8.5–10.1)
CHLORIDE SERPL-SCNC: 109 MMOL/L (ref 94–109)
CHOLEST SERPL-MCNC: 126 MG/DL
CO2 SERPL-SCNC: 25 MMOL/L (ref 20–32)
CREAT SERPL-MCNC: 0.68 MG/DL (ref 0.66–1.25)
GFR SERPL CREATININE-BSD FRML MDRD: >90 ML/MIN/{1.73_M2}
GLUCOSE SERPL-MCNC: 165 MG/DL (ref 70–99)
HBA1C MFR BLD: 7.7 % (ref 0–5.6)
HDLC SERPL-MCNC: 30 MG/DL
LDLC SERPL CALC-MCNC: 53 MG/DL
NONHDLC SERPL-MCNC: 96 MG/DL
POTASSIUM SERPL-SCNC: 4 MMOL/L (ref 3.4–5.3)
PROT SERPL-MCNC: 7.5 G/DL (ref 6.8–8.8)
SODIUM SERPL-SCNC: 139 MMOL/L (ref 133–144)
T4 FREE SERPL-MCNC: 1.01 NG/DL (ref 0.76–1.46)
TRIGL SERPL-MCNC: 214 MG/DL
TSH SERPL DL<=0.005 MIU/L-ACNC: 4.42 MU/L (ref 0.4–4)

## 2020-06-18 PROCEDURE — 99396 PREV VISIT EST AGE 40-64: CPT | Performed by: NURSE PRACTITIONER

## 2020-06-18 PROCEDURE — 84439 ASSAY OF FREE THYROXINE: CPT | Performed by: NURSE PRACTITIONER

## 2020-06-18 PROCEDURE — 83036 HEMOGLOBIN GLYCOSYLATED A1C: CPT | Performed by: NURSE PRACTITIONER

## 2020-06-18 PROCEDURE — 84443 ASSAY THYROID STIM HORMONE: CPT | Performed by: NURSE PRACTITIONER

## 2020-06-18 PROCEDURE — 80061 LIPID PANEL: CPT | Performed by: NURSE PRACTITIONER

## 2020-06-18 PROCEDURE — 80053 COMPREHEN METABOLIC PANEL: CPT | Performed by: NURSE PRACTITIONER

## 2020-06-18 PROCEDURE — 36415 COLL VENOUS BLD VENIPUNCTURE: CPT | Performed by: NURSE PRACTITIONER

## 2020-06-18 PROCEDURE — 99207 C FOOT EXAM  NO CHARGE: CPT | Mod: 25 | Performed by: NURSE PRACTITIONER

## 2020-06-18 RX ORDER — METFORMIN HCL 500 MG
1000 TABLET, EXTENDED RELEASE 24 HR ORAL 2 TIMES DAILY WITH MEALS
Qty: 360 TABLET | Refills: 3 | Status: SHIPPED | OUTPATIENT
Start: 2020-06-18 | End: 2021-06-02

## 2020-06-18 RX ORDER — GLIMEPIRIDE 4 MG/1
4 TABLET ORAL
Qty: 90 TABLET | Refills: 1 | Status: SHIPPED | OUTPATIENT
Start: 2020-06-18 | End: 2020-12-15

## 2020-06-18 RX ORDER — GLIMEPIRIDE 2 MG/1
2 TABLET ORAL
Qty: 90 TABLET | Refills: 3 | Status: SHIPPED | OUTPATIENT
Start: 2020-06-18 | End: 2020-06-18

## 2020-06-18 RX ORDER — ATORVASTATIN CALCIUM 10 MG/1
10 TABLET, FILM COATED ORAL DAILY
Qty: 90 TABLET | Refills: 3 | Status: SHIPPED | OUTPATIENT
Start: 2020-06-18 | End: 2021-06-02

## 2020-06-18 ASSESSMENT — MIFFLIN-ST. JEOR: SCORE: 1947.59

## 2020-06-18 NOTE — NURSING NOTE
"Chief Complaint   Patient presents with     Physical     Diabetes       Initial /70 (BP Location: Right arm, Patient Position: Sitting, Cuff Size: Adult Regular)   Pulse 93   Temp 97.7  F (36.5  C) (Temporal)   Ht 1.683 m (5' 6.25\")   Wt 110.6 kg (243 lb 12.8 oz)   SpO2 97%   BMI 39.05 kg/m   Estimated body mass index is 39.05 kg/m  as calculated from the following:    Height as of this encounter: 1.683 m (5' 6.25\").    Weight as of this encounter: 110.6 kg (243 lb 12.8 oz).  Medication Reconciliation: complete      KHALIDA Kahn      "

## 2020-06-18 NOTE — PATIENT INSTRUCTIONS
PLAN:   1.   Symptomatic therapy suggested: increase glimepiride 4 mg a day.  Continue other current medication regimen unchanged.    2.  Orders Placed This Encounter   Medications     atorvastatin (LIPITOR) 10 MG tablet     Sig: Take 1 tablet (10 mg) by mouth daily     Dispense:  90 tablet     Refill:  3     DISCONTD: glimepiride (AMARYL) 2 MG tablet     Sig: Take 1 tablet (2 mg) by mouth every morning (before breakfast)     Dispense:  90 tablet     Refill:  3     metFORMIN (GLUCOPHAGE-XR) 500 MG 24 hr tablet     Sig: Take 2 tablets (1,000 mg) by mouth 2 times daily (with meals)     Dispense:  360 tablet     Refill:  3     glimepiride (AMARYL) 4 MG tablet     Sig: Take 1 tablet (4 mg) by mouth every morning (before breakfast)     Dispense:  90 tablet     Refill:  1     Orders Placed This Encounter   Procedures     FOOT EXAM     Albumin Random Urine Quantitative with Creat Ratio     TSH with free T4 reflex     Lipid panel reflex to direct LDL Fasting     T4 free     Basic metabolic panel     Hemoglobin A1c       3. Patient needs to follow up in if no improvement,or sooner if worsening of symptoms or other symptoms develop.  FUTURE LABS:       - Schedule non-fasting labs in 3 months  Will follow up and/or notify patient of  results via My Chart to determine further need for followup  Work on weight loss  Regular exercise  Follow up in 6 months.  Follow up office visit in one year for annual health maintenance exam, sooner PRN.    Preventive Health Recommendations  Male Ages 40 to 49    Yearly exam:             See your health care provider every year in order to  o   Review health changes.   o   Discuss preventive care.    o   Review your medicines if your doctor has prescribed any.    You should be tested each year for STDs (sexually transmitted diseases) if you re at risk.     Have a cholesterol test every 5 years.     Have a colonoscopy (test for colon cancer) if someone in your family has had colon cancer or  polyps before age 50.     After age 45, have a diabetes test (fasting glucose). If you are at risk for diabetes, you should have this test every 3 years.      Talk with your health care provider about whether or not a prostate cancer screening test (PSA) is right for you.    Shots: Get a flu shot each year. Get a tetanus shot every 10 years.     Nutrition:    Eat at least 5 servings of fruits and vegetables daily.     Eat whole-grain bread, whole-wheat pasta and brown rice instead of white grains and rice.     Get adequate Calcium and Vitamin D.     Lifestyle    Exercise for at least 150 minutes a week (30 minutes a day, 5 days a week). This will help you control your weight and prevent disease.     Limit alcohol to one drink per day.     No smoking.     Wear sunscreen to prevent skin cancer.     See your dentist every six months for an exam and cleaning.
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
## 2020-06-18 NOTE — RESULT ENCOUNTER NOTE
Ekaterina Rae,    Attached are your test results.  -Liver and gallbladder tests (ALT,AST, Alk phos,bilirubin) are  Elevated but improved. ADVISE: continue to monitor   Improved likely due to weight loss and improvement in A1c  -Kidney function (GFR) is normal.  -Sodium is normal.  -Potassium is normal.  -Calcium is normal.  -Glucose is elevated due to your diabetes.  -A1C (test of diabetes control the last 2-3 months) is closer to your goal. Please recheck your A1C test in 3 months.    Please contact us if you have any questions.    Melissa Liu, CNP

## 2020-12-15 ENCOUNTER — MYC REFILL (OUTPATIENT)
Dept: PEDIATRICS | Facility: CLINIC | Age: 45
End: 2020-12-15

## 2020-12-15 DIAGNOSIS — E11.65 TYPE 2 DIABETES MELLITUS WITH HYPERGLYCEMIA, WITHOUT LONG-TERM CURRENT USE OF INSULIN (H): ICD-10-CM

## 2020-12-16 RX ORDER — GLIMEPIRIDE 4 MG/1
4 TABLET ORAL
Qty: 90 TABLET | Refills: 0 | Status: SHIPPED | OUTPATIENT
Start: 2020-12-16 | End: 2021-03-04

## 2020-12-16 NOTE — TELEPHONE ENCOUNTER
Medication is being filled per protocol for 1 time refill only due to:  Patient needs to be seen because due for lab and appointment.  Patient has appointment scheduled for 1/7/2021.    Cynthia Hernández RN

## 2020-12-27 ENCOUNTER — HEALTH MAINTENANCE LETTER (OUTPATIENT)
Age: 45
End: 2020-12-27

## 2021-01-06 DIAGNOSIS — E11.65 TYPE 2 DIABETES MELLITUS WITH HYPERGLYCEMIA, WITHOUT LONG-TERM CURRENT USE OF INSULIN (H): ICD-10-CM

## 2021-01-06 LAB
ANION GAP SERPL CALCULATED.3IONS-SCNC: 4 MMOL/L (ref 3–14)
BUN SERPL-MCNC: 11 MG/DL (ref 7–30)
CALCIUM SERPL-MCNC: 9.5 MG/DL (ref 8.5–10.1)
CHLORIDE SERPL-SCNC: 107 MMOL/L (ref 94–109)
CO2 SERPL-SCNC: 29 MMOL/L (ref 20–32)
CREAT SERPL-MCNC: 0.77 MG/DL (ref 0.66–1.25)
GFR SERPL CREATININE-BSD FRML MDRD: >90 ML/MIN/{1.73_M2}
GLUCOSE SERPL-MCNC: 137 MG/DL (ref 70–99)
HBA1C MFR BLD: 8.5 % (ref 0–5.6)
POTASSIUM SERPL-SCNC: 3.9 MMOL/L (ref 3.4–5.3)
SODIUM SERPL-SCNC: 140 MMOL/L (ref 133–144)

## 2021-01-06 PROCEDURE — 80048 BASIC METABOLIC PNL TOTAL CA: CPT | Performed by: NURSE PRACTITIONER

## 2021-01-06 PROCEDURE — 36415 COLL VENOUS BLD VENIPUNCTURE: CPT | Performed by: NURSE PRACTITIONER

## 2021-01-06 PROCEDURE — 83036 HEMOGLOBIN GLYCOSYLATED A1C: CPT | Performed by: NURSE PRACTITIONER

## 2021-01-07 ENCOUNTER — OFFICE VISIT (OUTPATIENT)
Dept: PEDIATRICS | Facility: CLINIC | Age: 46
End: 2021-01-07
Payer: COMMERCIAL

## 2021-01-07 VITALS
HEIGHT: 66 IN | SYSTOLIC BLOOD PRESSURE: 140 MMHG | BODY MASS INDEX: 39.5 KG/M2 | WEIGHT: 245.8 LBS | OXYGEN SATURATION: 97 % | DIASTOLIC BLOOD PRESSURE: 86 MMHG | TEMPERATURE: 98.2 F | HEART RATE: 102 BPM

## 2021-01-07 DIAGNOSIS — E66.01 MORBID OBESITY (H): Primary | ICD-10-CM

## 2021-01-07 DIAGNOSIS — E11.65 TYPE 2 DIABETES MELLITUS WITH HYPERGLYCEMIA, WITHOUT LONG-TERM CURRENT USE OF INSULIN (H): ICD-10-CM

## 2021-01-07 DIAGNOSIS — E78.5 HYPERLIPIDEMIA WITH TARGET LDL LESS THAN 100: ICD-10-CM

## 2021-01-07 PROCEDURE — 99214 OFFICE O/P EST MOD 30 MIN: CPT | Performed by: NURSE PRACTITIONER

## 2021-01-07 ASSESSMENT — MIFFLIN-ST. JEOR: SCORE: 1956.66

## 2021-01-07 NOTE — PROGRESS NOTES
Linda Amaya is a 45 year old who presents to clinic today for the following health issues     HPI     Diabetes Follow-up      How often are you checking your blood sugar? Has not checked for a month now, if he feels normal then he doesn't check it    What concerns do you have today about your diabetes? Hgb A1C is elevated     Do you have any of these symptoms? (Select all that apply)  No numbness or tingling in feet.  No redness, sores or blisters on feet.  No complaints of excessive thirst.  No reports of blurry vision.  No significant changes to weight.    Have you had a diabetic eye exam in the last 12 months? No      BP Readings from Last 2 Encounters:   01/07/21 (!) 140/86   06/18/20 120/70     Hemoglobin A1C (%)   Date Value   01/06/2021 8.5 (H)   06/18/2020 7.7 (H)     LDL Cholesterol Calculated (mg/dL)   Date Value   06/18/2020 53   06/10/2019 63               Hyperlipidemia Follow-Up      Are you regularly taking any medication or supplement to lower your cholesterol?   Yes- atorvastatin 10 mg    Are you having muscle aches or other side effects that you think could be caused by your cholesterol lowering medication?  Yes- Body aches for the last few months      How many servings of fruits and vegetables do you eat daily?  2-3    On average, how many sweetened beverages do you drink each day (Examples: soda, juice, sweet tea, etc.  Do NOT count diet or artificially sweetened beverages)?   0    How many days per week do you exercise enough to make your heart beat faster? 5    How many minutes a day do you exercise enough to make your heart beat faster? 30 - 60    How many days per week do you miss taking your medication? 0    Review of Systems   CONSTITUTIONAL:NEGATIVE for fever, chills, change in weight  ENT/MOUTH: NEGATIVE for ear, mouth and throat problems  RESP:NEGATIVE for significant cough or SOB  CV: NEGATIVE for chest pain, palpitations or peripheral edema  GI: NEGATIVE for nausea,  "abdominal pain, heartburn, or change in bowel habits  MUSCULOSKELETAL: NEGATIVE for significant arthralgias or myalgia  NEURO: NEGATIVE for weakness, dizziness or paresthesias  ENDOCRINE: NEGATIVE for temperature intolerance, skin/hair changes and POSITIVE  for HX diabetes  HEME/ALLERGY/IMMUNE: NEGATIVE for bleeding problems      Objective    BP (!) 140/86 (BP Location: Right arm)   Pulse 102   Temp 98.2  F (36.8  C) (Temporal)   Ht 1.683 m (5' 6.25\")   Wt 111.5 kg (245 lb 12.8 oz)   SpO2 97%   BMI 39.37 kg/m    Body mass index is 39.37 kg/m .   Repeat Blood Pressure:  BP Pulse Site Cuff Size Time Date   (!) 144/99 102 Right arm Adult Large  2:32 PM 1/7/2021   (!) 165/95 --- Left arm Adult Large  2:35 PM 1/7/2021   (!) 153/95 --- Right arm Adult Large  2:39 PM 1/7/2021   (!) 140/86 --- Right arm ---  3:09 PM 1/7/2021   No orthostatic vitals data filed.  No peak flow data filed.  No pain information filed.      BP Readings from Last 6 Encounters:   01/07/21 (!) 153/95   06/18/20 120/70   12/16/19 120/80   06/13/19 130/90   12/06/18 120/90   08/23/18 126/87     Wt Readings from Last 4 Encounters:   01/07/21 111.5 kg (245 lb 12.8 oz)   06/18/20 110.6 kg (243 lb 12.8 oz)   12/16/19 111.7 kg (246 lb 3.2 oz)   06/13/19 107.6 kg (237 lb 4.8 oz)       Physical Exam   GENERAL: Patient is well nourished, well developed, obese,in no apparent distress, non-toxic, in no respiratory distress and acyanotic, alert, cooperative and well hydrated  EYES: Eyes grossly normal to inspection and conjunctivae and sclerae normal  HENT:ear canals and TM's normal and nose and mouth without ulcers or lesions   NECK:normal, supple and no adenopathy  CARDIAC:regular rates and rhythm, no murmur, click or rub and no irregular beats  without LE edema bilaterally  RESP: normal respiratory rate and rhythm, lungs clear to auscultation  ABD:soft, nontender  SKIN: Skin color, texture, turgor normal. No rashes or lesions.  MS: extremities normal- " no gross deformities noted, gait normal and normal muscle tone  NEURO: mentation intact and speech normal  PSYCH: Alert, oriented, thought content appropriate,mentation appears normal., affect and mood normal      Orders Only on 01/06/2021   Component Date Value Ref Range Status     Sodium 01/06/2021 140  133 - 144 mmol/L Final     Potassium 01/06/2021 3.9  3.4 - 5.3 mmol/L Final     Chloride 01/06/2021 107  94 - 109 mmol/L Final     Carbon Dioxide 01/06/2021 29  20 - 32 mmol/L Final     Anion Gap 01/06/2021 4  3 - 14 mmol/L Final     Glucose 01/06/2021 137* 70 - 99 mg/dL Final     Urea Nitrogen 01/06/2021 11  7 - 30 mg/dL Final     Creatinine 01/06/2021 0.77  0.66 - 1.25 mg/dL Final     GFR Estimate 01/06/2021 >90  >60 mL/min/[1.73_m2] Final    Comment: Non  GFR Calc  Starting 12/18/2018, serum creatinine based estimated GFR (eGFR) will be   calculated using the Chronic Kidney Disease Epidemiology Collaboration   (CKD-EPI) equation.       GFR Estimate If Black 01/06/2021 >90  >60 mL/min/[1.73_m2] Final    Comment:  GFR Calc  Starting 12/18/2018, serum creatinine based estimated GFR (eGFR) will be   calculated using the Chronic Kidney Disease Epidemiology Collaboration   (CKD-EPI) equation.       Calcium 01/06/2021 9.5  8.5 - 10.1 mg/dL Final     Hemoglobin A1C 01/06/2021 8.5* 0 - 5.6 % Final    Comment: Normal <5.7% Prediabetes 5.7-6.4%  Diabetes 6.5% or higher - adopted from ADA   consensus guidelines.             Assessment & Plan     Type 2 diabetes mellitus with hyperglycemia, without long-term current use of insulin (H)  foot care discussed and Podiatry visits discussed, annual eye examinations at Ophthalmology discussed, glycohemoglobin monitoring discussed and long term diabetic complications discussed  Attempt to improve diet  Weight loss advised  Increased exercise advised  Medication changes as follows: will try again to start Trulicity from insurance company    Recheck in  "3 months, sooner should new symptoms or   problems arise.  - Basic metabolic panel  - Hemoglobin A1c    Morbid obesity (H)  Healthy diet and exercise reviewed.  Limit pop and juice intake.  Risks of obesity discussed.  Encourage exercise.  Limit TV/Computer/game time to one hour a day.      Hyperlipidemia with target LDL less than 100  Hyperlipidemia Plan:  Regular exercise and a low fat diet are encouraged.Patient is to follow-up in 6 months for a fasting lipid profile.       BMI:   Estimated body mass index is 39.37 kg/m  as calculated from the following:    Height as of this encounter: 1.683 m (5' 6.25\").    Weight as of this encounter: 111.5 kg (245 lb 12.8 oz).         See Patient Instructions  Patient Instructions     PLAN:   1.   Symptomatic therapy suggested: will start on Trulicity if insurance will cover.  2.  Orders Placed This Encounter   Medications     dulaglutide (TRULICITY) 0.75 MG/0.5ML pen     Sig: Inject 0.75 mg Subcutaneous every 7 days     Dispense:  2 mL     Refill:  3     Orders Placed This Encounter   Procedures     Basic metabolic panel     Hemoglobin A1c       3. Patient needs to follow up in if no improvement,or sooner if worsening of symptoms or other symptoms develop.  FUTURE LABS:       - Schedule non-fasting labs in 3 months  Follow up in 6 months.  Schedule physical exam       No follow-ups on file.    LOUIE Salcedo [facility]    "

## 2021-01-07 NOTE — PATIENT INSTRUCTIONS
PLAN:   1.   Symptomatic therapy suggested: will start on Trulicity if insurance will cover.  2.  Orders Placed This Encounter   Medications     dulaglutide (TRULICITY) 0.75 MG/0.5ML pen     Sig: Inject 0.75 mg Subcutaneous every 7 days     Dispense:  2 mL     Refill:  3     Orders Placed This Encounter   Procedures     Basic metabolic panel     Hemoglobin A1c       3. Patient needs to follow up in if no improvement,or sooner if worsening of symptoms or other symptoms develop.  FUTURE LABS:       - Schedule non-fasting labs in 3 months  Follow up in 6 months.  Schedule physical exam

## 2021-01-14 ENCOUNTER — TELEPHONE (OUTPATIENT)
Dept: FAMILY MEDICINE | Facility: CLINIC | Age: 46
End: 2021-01-14

## 2021-01-14 NOTE — TELEPHONE ENCOUNTER
PA for liraglutide (VICTOZA) 18 MG/3ML solution    Visit [employer]al and enter TRX code [id number] [email or web address]    PA or alternative    Dominique Bains

## 2021-01-18 NOTE — TELEPHONE ENCOUNTER
Central Prior Authorization Team   Phone: [phone]      PA Initiation    Medication: liraglutide (VICTOZA) 18 MG/3ML solution-Initiated  Insurance Company: [employer] - Phone [phone] Fax [phone]  Pharmacy Filling the Rx: [facility] [place], [place] - [address] [facility]  Filling Pharmacy Phone: [phone]  Filling Pharmacy Fax:    Start Date: 1/18/2021

## 2021-01-18 NOTE — TELEPHONE ENCOUNTER
Prior Authorization Approval    Authorization Effective Date: 1/18/2021  Authorization Expiration Date: 12/31/2036  Medication: liraglutide (VICTOZA) 18 MG/3ML solution-APPROVED  Approved Dose/Quantity:   Reference #:     Insurance Company: Other (see comments)Comment:  [place] [phone]  Expected CoPay:       CoPay Card Available:      Foundation Assistance Needed:    Which Pharmacy is filling the prescription (Not needed for infusion/clinic administered): [facility] [address] [place] [id number] [facility]  Pharmacy Notified: Yes  Patient Notified: No    Pharmacy will notify patient when medication is ready.

## 2021-01-19 ENCOUNTER — TELEPHONE (OUTPATIENT)
Dept: PEDIATRICS | Facility: CLINIC | Age: 46
End: 2021-01-19

## 2021-01-26 ENCOUNTER — TELEPHONE (OUTPATIENT)
Dept: FAMILY MEDICINE | Facility: CLINIC | Age: 46
End: 2021-01-26

## 2021-01-26 NOTE — TELEPHONE ENCOUNTER
MTM referral from: [facility] visit (referral by provider)    MTM referral outreach attempt #2 on January 26, 2021 at 9:52 AM      Outcome: Patient is not interested at this time because of cost. Due to insurance, pt is private pay. He stated that he shouldn't be billed for asking a couple of questions. PCP please contact pt in regards to his questions/concerns for his meds. , will route to MTM Pharmacist/Provider as an FYI. Thank you for the referral.     Gregory Diaz, MTM coordinator

## 2021-03-04 ENCOUNTER — MYC REFILL (OUTPATIENT)
Dept: PEDIATRICS | Facility: CLINIC | Age: 46
End: 2021-03-04

## 2021-03-04 DIAGNOSIS — E11.65 TYPE 2 DIABETES MELLITUS WITH HYPERGLYCEMIA, WITHOUT LONG-TERM CURRENT USE OF INSULIN (H): ICD-10-CM

## 2021-03-04 RX ORDER — GLIMEPIRIDE 4 MG/1
4 TABLET ORAL
Qty: 90 TABLET | Refills: 0 | Status: SHIPPED | OUTPATIENT
Start: 2021-03-04 | End: 2021-06-02

## 2021-05-31 DIAGNOSIS — E11.65 TYPE 2 DIABETES MELLITUS WITH HYPERGLYCEMIA, WITHOUT LONG-TERM CURRENT USE OF INSULIN (H): ICD-10-CM

## 2021-05-31 DIAGNOSIS — E78.5 HYPERLIPIDEMIA WITH TARGET LDL LESS THAN 100: ICD-10-CM

## 2021-05-31 NOTE — LETTER
Elaina 3, 2021    Jose Luis Rae  [address] [place] [address]    Dear Jose Luis,       We recently received a refill request for glimepiride , metformin and atorvastatin.  We have refilled this for a one time 90 day supply only because you are due for a:    Diabetes office visit      Please call at your earliest convenience so that there will not be a delay with your future refills.          Thank you,   Your [facility] Team/  [phone]

## 2021-06-02 RX ORDER — METFORMIN HCL 500 MG
TABLET, EXTENDED RELEASE 24 HR ORAL
Qty: 360 TABLET | Refills: 0 | Status: SHIPPED | OUTPATIENT
Start: 2021-06-02 | End: 2021-06-15

## 2021-06-02 RX ORDER — GLIMEPIRIDE 4 MG/1
TABLET ORAL
Qty: 90 TABLET | Refills: 0 | Status: SHIPPED | OUTPATIENT
Start: 2021-06-02 | End: 2021-06-15

## 2021-06-02 RX ORDER — ATORVASTATIN CALCIUM 10 MG/1
TABLET, FILM COATED ORAL
Qty: 90 TABLET | Refills: 0 | Status: SHIPPED | OUTPATIENT
Start: 2021-06-02 | End: 2021-06-15

## 2021-06-02 NOTE — TELEPHONE ENCOUNTER
Routing refill request to provider for review/approval because:  Labs not current:  hgb a1c, lipids.  Sonia Long BSN, RN

## 2021-06-15 ENCOUNTER — OFFICE VISIT (OUTPATIENT)
Dept: FAMILY MEDICINE | Facility: CLINIC | Age: 46
End: 2021-06-15
Payer: COMMERCIAL

## 2021-06-15 VITALS
RESPIRATION RATE: 16 BRPM | BODY MASS INDEX: 39.65 KG/M2 | DIASTOLIC BLOOD PRESSURE: 94 MMHG | WEIGHT: 247.5 LBS | TEMPERATURE: 98.3 F | SYSTOLIC BLOOD PRESSURE: 146 MMHG | OXYGEN SATURATION: 97 % | HEART RATE: 107 BPM

## 2021-06-15 DIAGNOSIS — I10 HTN, GOAL BELOW 140/90: ICD-10-CM

## 2021-06-15 DIAGNOSIS — E78.5 HYPERLIPIDEMIA WITH TARGET LDL LESS THAN 100: ICD-10-CM

## 2021-06-15 DIAGNOSIS — E11.65 TYPE 2 DIABETES MELLITUS WITH HYPERGLYCEMIA, WITHOUT LONG-TERM CURRENT USE OF INSULIN (H): Primary | ICD-10-CM

## 2021-06-15 DIAGNOSIS — E11.65 TYPE 2 DIABETES MELLITUS WITH HYPERGLYCEMIA, WITHOUT LONG-TERM CURRENT USE OF INSULIN (H): ICD-10-CM

## 2021-06-15 LAB
ANION GAP SERPL CALCULATED.3IONS-SCNC: 7 MMOL/L (ref 3–14)
BUN SERPL-MCNC: 13 MG/DL (ref 7–30)
CALCIUM SERPL-MCNC: 8.6 MG/DL (ref 8.5–10.1)
CHLORIDE SERPL-SCNC: 108 MMOL/L (ref 94–109)
CO2 SERPL-SCNC: 25 MMOL/L (ref 20–32)
CREAT SERPL-MCNC: 0.75 MG/DL (ref 0.66–1.25)
GFR SERPL CREATININE-BSD FRML MDRD: >90 ML/MIN/{1.73_M2}
GLUCOSE SERPL-MCNC: 163 MG/DL (ref 70–99)
HBA1C MFR BLD: 7.2 % (ref 0–5.6)
POTASSIUM SERPL-SCNC: 3.9 MMOL/L (ref 3.4–5.3)
SODIUM SERPL-SCNC: 140 MMOL/L (ref 133–144)

## 2021-06-15 PROCEDURE — 36415 COLL VENOUS BLD VENIPUNCTURE: CPT | Performed by: NURSE PRACTITIONER

## 2021-06-15 PROCEDURE — 99214 OFFICE O/P EST MOD 30 MIN: CPT | Performed by: NURSE PRACTITIONER

## 2021-06-15 PROCEDURE — 83036 HEMOGLOBIN GLYCOSYLATED A1C: CPT | Performed by: NURSE PRACTITIONER

## 2021-06-15 PROCEDURE — 80048 BASIC METABOLIC PNL TOTAL CA: CPT | Performed by: NURSE PRACTITIONER

## 2021-06-15 RX ORDER — LISINOPRIL 10 MG/1
10 TABLET ORAL DAILY
Qty: 90 TABLET | Refills: 1 | Status: SHIPPED | OUTPATIENT
Start: 2021-06-15 | End: 2021-12-07

## 2021-06-15 RX ORDER — METFORMIN HCL 500 MG
1000 TABLET, EXTENDED RELEASE 24 HR ORAL 2 TIMES DAILY WITH MEALS
Qty: 360 TABLET | Refills: 3 | Status: SHIPPED | OUTPATIENT
Start: 2021-06-15 | End: 2021-12-07

## 2021-06-15 RX ORDER — ATORVASTATIN CALCIUM 10 MG/1
10 TABLET, FILM COATED ORAL DAILY
Qty: 90 TABLET | Refills: 1 | Status: SHIPPED | OUTPATIENT
Start: 2021-06-15 | End: 2021-09-14

## 2021-06-15 NOTE — PATIENT INSTRUCTIONS
PLAN:   1.   Symptomatic therapy suggested: start on lisinopril once a day.  Continue current medications as prescribed.   2.  Orders Placed This Encounter   Medications     lisinopril (ZESTRIL) 10 MG tablet     Sig: Take 1 tablet (10 mg) by mouth daily     Dispense:  90 tablet     Refill:  1     atorvastatin (LIPITOR) 10 MG tablet     Sig: Take 1 tablet (10 mg) by mouth daily     Dispense:  90 tablet     Refill:  1     metFORMIN (GLUCOPHAGE-XR) 500 MG 24 hr tablet     Sig: Take 2 tablets (1,000 mg) by mouth 2 times daily (with meals)     Dispense:  360 tablet     Refill:  3     3. Patient needs to follow up in if no improvement,or sooner if worsening of symptoms or other symptoms develop.  Follow up in 6 months for annual health maintenance exam, sooner PRN.
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
## 2021-08-14 ENCOUNTER — HEALTH MAINTENANCE LETTER (OUTPATIENT)
Age: 46
End: 2021-08-14

## 2021-09-13 DIAGNOSIS — E78.5 HYPERLIPIDEMIA WITH TARGET LDL LESS THAN 100: ICD-10-CM

## 2021-09-14 RX ORDER — ATORVASTATIN CALCIUM 10 MG/1
TABLET, FILM COATED ORAL
Qty: 90 TABLET | Refills: 0 | Status: SHIPPED | OUTPATIENT
Start: 2021-09-14 | End: 2021-12-07

## 2021-09-14 NOTE — TELEPHONE ENCOUNTER
Pt. Has a lab appointment in 2 months, Pt. Needs current labs for further refills.   Dia refills given   Iris Posada RN, BSN   [facility]          LDL on file in past 12 months        Recent Labs   Lab Test 06/18/20  0710   LDL 53

## 2021-10-09 ENCOUNTER — HEALTH MAINTENANCE LETTER (OUTPATIENT)
Age: 46
End: 2021-10-09

## 2021-12-05 NOTE — PATIENT INSTRUCTIONS
PLAN:   1.   Symptomatic therapy suggested: Continue current medications as prescribed.   Will increase lisinopril 20 mg   2.  Orders Placed This Encounter   Medications     lisinopril (ZESTRIL) 20 MG tablet     Sig: Take 1 tablet (20 mg) by mouth daily     Dispense:  90 tablet     Refill:  3     betamethasone dipropionate (DIPROSONE) 0.05 % external lotion     Sig: Apply topically 2 times daily     Dispense:  60 mL     Refill:  1     atorvastatin (LIPITOR) 10 MG tablet     Sig: Take 1 tablet (10 mg) by mouth daily     Dispense:  90 tablet     Refill:  3     liraglutide (VICTOZA) 18 MG/3ML solution     Sig: Inject 0.6 mg Subcutaneous daily for 7 days, THEN 1.2 mg daily.     Dispense:  18.7 mL     Refill:  3     metFORMIN (GLUCOPHAGE-XR) 500 MG 24 hr tablet     Sig: Take 2 tablets (1,000 mg) by mouth 2 times daily (with meals)     Dispense:  360 tablet     Refill:  3     Orders Placed This Encounter   Procedures     Lipid panel reflex to direct LDL Fasting     Albumin Random Urine Quantitative with Creat Ratio     Comprehensive metabolic panel     TSH with free T4 reflex     Hemoglobin A1c     Adult Dermatology Referral       3. Patient needs to follow up in if no improvement,or sooner if worsening of symptoms or other symptoms develop.  Work on weight loss  Regular exercise  Follow up in 6 months.  Will follow up and/or notify patient of  results via My Chart to determine further need for followup  Follow up office visit in one year for annual health maintenance exam, sooner PRN.    Preventive Health Recommendations  Male Ages 40 to 49    Yearly exam:             See your health care provider every year in order to  o   Review health changes.   o   Discuss preventive care.    o   Review your medicines if your doctor has prescribed any.    You should be tested each year for STDs (sexually transmitted diseases) if you re at risk.     Have a cholesterol test every 5 years.     Have a colonoscopy (test for colon cancer)  if someone in your family has had colon cancer or polyps before age 50.     After age 45, have a diabetes test (fasting glucose). If you are at risk for diabetes, you should have this test every 3 years.      Talk with your health care provider about whether or not a prostate cancer screening test (PSA) is right for you.    Shots: Get a flu shot each year. Get a tetanus shot every 10 years.     Nutrition:    Eat at least 5 servings of fruits and vegetables daily.     Eat whole-grain bread, whole-wheat pasta and brown rice instead of white grains and rice.     Get adequate Calcium and Vitamin D.     Lifestyle    Exercise for at least 150 minutes a week (30 minutes a day, 5 days a week). This will help you control your weight and prevent disease.     Limit alcohol to one drink per day.     No smoking.     Wear sunscreen to prevent skin cancer.     See your dentist every six months for an exam and cleaning.
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
## 2021-12-07 ENCOUNTER — OFFICE VISIT (OUTPATIENT)
Dept: FAMILY MEDICINE | Facility: CLINIC | Age: 46
End: 2021-12-07
Payer: COMMERCIAL

## 2021-12-07 VITALS
HEART RATE: 100 BPM | WEIGHT: 242 LBS | OXYGEN SATURATION: 96 % | SYSTOLIC BLOOD PRESSURE: 141 MMHG | TEMPERATURE: 98.1 F | BODY MASS INDEX: 38.77 KG/M2 | DIASTOLIC BLOOD PRESSURE: 92 MMHG

## 2021-12-07 DIAGNOSIS — L98.9 SKIN LESION: ICD-10-CM

## 2021-12-07 DIAGNOSIS — L30.9 DERMATITIS: ICD-10-CM

## 2021-12-07 DIAGNOSIS — I10 HTN, GOAL BELOW 140/90: ICD-10-CM

## 2021-12-07 DIAGNOSIS — Z00.00 ROUTINE GENERAL MEDICAL EXAMINATION AT A HEALTH CARE FACILITY: Primary | ICD-10-CM

## 2021-12-07 DIAGNOSIS — R79.89 ABNORMAL LIVER FUNCTION TESTS: ICD-10-CM

## 2021-12-07 DIAGNOSIS — E78.5 HYPERLIPIDEMIA WITH TARGET LDL LESS THAN 100: ICD-10-CM

## 2021-12-07 DIAGNOSIS — E11.65 TYPE 2 DIABETES MELLITUS WITH HYPERGLYCEMIA, WITHOUT LONG-TERM CURRENT USE OF INSULIN (H): ICD-10-CM

## 2021-12-07 DIAGNOSIS — E66.01 MORBID OBESITY (H): ICD-10-CM

## 2021-12-07 DIAGNOSIS — Z13.29 SCREENING FOR THYROID DISORDER: ICD-10-CM

## 2021-12-07 LAB
ALBUMIN SERPL-MCNC: 4.3 G/DL (ref 3.4–5)
ALP SERPL-CCNC: 83 U/L (ref 40–150)
ALT SERPL W P-5'-P-CCNC: 145 U/L (ref 0–70)
ANION GAP SERPL CALCULATED.3IONS-SCNC: 7 MMOL/L (ref 3–14)
AST SERPL W P-5'-P-CCNC: 73 U/L (ref 0–45)
BILIRUB SERPL-MCNC: 1.4 MG/DL (ref 0.2–1.3)
BUN SERPL-MCNC: 12 MG/DL (ref 7–30)
CALCIUM SERPL-MCNC: 9 MG/DL (ref 8.5–10.1)
CHLORIDE BLD-SCNC: 105 MMOL/L (ref 94–109)
CHOLEST SERPL-MCNC: 130 MG/DL
CO2 SERPL-SCNC: 26 MMOL/L (ref 20–32)
CREAT SERPL-MCNC: 0.7 MG/DL (ref 0.66–1.25)
CREAT UR-MCNC: 74 MG/DL
FASTING STATUS PATIENT QL REPORTED: NO
GFR SERPL CREATININE-BSD FRML MDRD: >90 ML/MIN/1.73M2
GLUCOSE BLD-MCNC: 140 MG/DL (ref 70–99)
HBA1C MFR BLD: 9.1 % (ref 0–5.6)
HDLC SERPL-MCNC: 37 MG/DL
LDLC SERPL CALC-MCNC: 58 MG/DL
MICROALBUMIN UR-MCNC: 96 MG/L
MICROALBUMIN/CREAT UR: 129.73 MG/G CR (ref 0–17)
NONHDLC SERPL-MCNC: 93 MG/DL
POTASSIUM BLD-SCNC: 3.9 MMOL/L (ref 3.4–5.3)
PROT SERPL-MCNC: 8 G/DL (ref 6.8–8.8)
SODIUM SERPL-SCNC: 138 MMOL/L (ref 133–144)
TRIGL SERPL-MCNC: 176 MG/DL
TSH SERPL DL<=0.005 MIU/L-ACNC: 2.6 MU/L (ref 0.4–4)

## 2021-12-07 PROCEDURE — 82043 UR ALBUMIN QUANTITATIVE: CPT | Performed by: NURSE PRACTITIONER

## 2021-12-07 PROCEDURE — 80061 LIPID PANEL: CPT | Performed by: NURSE PRACTITIONER

## 2021-12-07 PROCEDURE — 80053 COMPREHEN METABOLIC PANEL: CPT | Performed by: NURSE PRACTITIONER

## 2021-12-07 PROCEDURE — 36415 COLL VENOUS BLD VENIPUNCTURE: CPT | Performed by: NURSE PRACTITIONER

## 2021-12-07 PROCEDURE — 99396 PREV VISIT EST AGE 40-64: CPT | Performed by: NURSE PRACTITIONER

## 2021-12-07 PROCEDURE — 99214 OFFICE O/P EST MOD 30 MIN: CPT | Mod: 25 | Performed by: NURSE PRACTITIONER

## 2021-12-07 PROCEDURE — 83036 HEMOGLOBIN GLYCOSYLATED A1C: CPT | Performed by: NURSE PRACTITIONER

## 2021-12-07 PROCEDURE — 84443 ASSAY THYROID STIM HORMONE: CPT | Performed by: NURSE PRACTITIONER

## 2021-12-07 RX ORDER — ATORVASTATIN CALCIUM 10 MG/1
10 TABLET, FILM COATED ORAL DAILY
Qty: 90 TABLET | Refills: 3 | Status: SHIPPED | OUTPATIENT
Start: 2021-12-07 | End: 2022-09-19

## 2021-12-07 RX ORDER — LIRAGLUTIDE 6 MG/ML
INJECTION SUBCUTANEOUS
Qty: 18.7 ML | Refills: 3 | Status: SHIPPED | OUTPATIENT
Start: 2021-12-07 | End: 2021-12-08

## 2021-12-07 RX ORDER — METFORMIN HCL 500 MG
1000 TABLET, EXTENDED RELEASE 24 HR ORAL 2 TIMES DAILY WITH MEALS
Qty: 360 TABLET | Refills: 3 | Status: SHIPPED | OUTPATIENT
Start: 2021-12-07 | End: 2022-09-19

## 2021-12-07 RX ORDER — BETAMETHASONE DIPROPIONATE 0.5 MG/G
LOTION TOPICAL 2 TIMES DAILY
Qty: 60 ML | Refills: 1 | Status: SHIPPED | OUTPATIENT
Start: 2021-12-07 | End: 2023-09-25

## 2021-12-07 RX ORDER — LISINOPRIL 20 MG/1
20 TABLET ORAL DAILY
Qty: 90 TABLET | Refills: 3 | Status: SHIPPED | OUTPATIENT
Start: 2021-12-07 | End: 2022-09-19

## 2021-12-08 DIAGNOSIS — R79.89 ABNORMAL LIVER FUNCTION TESTS: Primary | ICD-10-CM

## 2021-12-08 DIAGNOSIS — E11.65 TYPE 2 DIABETES MELLITUS WITH HYPERGLYCEMIA, WITHOUT LONG-TERM CURRENT USE OF INSULIN (H): ICD-10-CM

## 2021-12-08 RX ORDER — LIRAGLUTIDE 6 MG/ML
1.8 INJECTION SUBCUTANEOUS DAILY
Qty: 27 ML | Refills: 1 | Status: SHIPPED | OUTPATIENT
Start: 2021-12-08 | End: 2022-06-16

## 2021-12-08 NOTE — RESULT ENCOUNTER NOTE
Ekaterina Rae,    Attached are your test results.  -Cholesterol levels are at your goal levels.  ADVISE: continuing your medication, a regular exercise program with at least 150 minutes of aerobic exercise per week, and eating a low saturated fat/low carbohydrate diet.  Also, you should recheck this fasting cholesterol panel in 12 months.  -Liver and gallbladder tests (ALT,AST, Alk phos,bilirubin) are significantly elevated. ADVISE: will recheck in 3 months. We did an ultrasound and noted signs of fatty liver so working on decreasing weight will help with this   -Kidney function (GFR) is normal.  -Sodium is normal.  -Potassium is normal.  -Calcium is normal.  -Glucose is elevated due to your diabetes.  The goal for diabetics without other complications is less than 7. I suggest that we change your regemin by increasing victoza to 1.8 mg I will send in a new script for you.  You should continue on all of your other medications. You should recheck your A1c in 3 months.    A1c(%) Mean blood sugar (mg/dl)  6 135  7 170  8 205  9 240  10 275  11 310  12 345        -TSH (thyroid stimulating hormone) level is normal which indicates normal thyroid function.  -Microalbumin (urine protein) level is elevated. This is suggestive of early damage to your kidneys from high blood pressure and diabetes.  ADVISE: avoiding anti-inflamatory agents such as ibuprofen (Advil, Motrin) or naproxen (Aleve) as much as possible, keeping your blood pressure in a normal range, and continuing your medication (lisinopril) that helps protect your kidneys.  Also, this should be rechecked in 1 year.    Please contact us if you have any questions.    Melissa Liu, CNP

## 2022-04-06 ENCOUNTER — OFFICE VISIT (OUTPATIENT)
Dept: DERMATOLOGY | Facility: CLINIC | Age: 47
End: 2022-04-06
Attending: NURSE PRACTITIONER
Payer: COMMERCIAL

## 2022-04-06 ENCOUNTER — LAB (OUTPATIENT)
Dept: LAB | Facility: CLINIC | Age: 47
End: 2022-04-06

## 2022-04-06 DIAGNOSIS — D18.01 CHERRY ANGIOMA: ICD-10-CM

## 2022-04-06 DIAGNOSIS — L81.4 SOLAR LENTIGO: ICD-10-CM

## 2022-04-06 DIAGNOSIS — E11.65 TYPE 2 DIABETES MELLITUS WITH HYPERGLYCEMIA, WITHOUT LONG-TERM CURRENT USE OF INSULIN (H): ICD-10-CM

## 2022-04-06 DIAGNOSIS — R79.89 ABNORMAL LIVER FUNCTION TESTS: ICD-10-CM

## 2022-04-06 DIAGNOSIS — D22.9 MULTIPLE BENIGN NEVI: ICD-10-CM

## 2022-04-06 DIAGNOSIS — D23.9 DERMATOFIBROMA: Primary | ICD-10-CM

## 2022-04-06 LAB
ALBUMIN SERPL-MCNC: 4.4 G/DL (ref 3.4–5)
ALP SERPL-CCNC: 73 U/L (ref 40–150)
ALT SERPL W P-5'-P-CCNC: 142 U/L (ref 0–70)
ANION GAP SERPL CALCULATED.3IONS-SCNC: 10 MMOL/L (ref 3–14)
AST SERPL W P-5'-P-CCNC: 62 U/L (ref 0–45)
BILIRUB SERPL-MCNC: 1 MG/DL (ref 0.2–1.3)
BUN SERPL-MCNC: 16 MG/DL (ref 7–30)
CALCIUM SERPL-MCNC: 9.5 MG/DL (ref 8.5–10.1)
CHLORIDE BLD-SCNC: 106 MMOL/L (ref 94–109)
CO2 SERPL-SCNC: 24 MMOL/L (ref 20–32)
CREAT SERPL-MCNC: 0.85 MG/DL (ref 0.66–1.25)
GFR SERPL CREATININE-BSD FRML MDRD: >90 ML/MIN/1.73M2
GLUCOSE BLD-MCNC: 218 MG/DL (ref 70–99)
HBA1C MFR BLD: 8.5 % (ref 0–5.6)
POTASSIUM BLD-SCNC: 4.2 MMOL/L (ref 3.4–5.3)
PROT SERPL-MCNC: 8.3 G/DL (ref 6.8–8.8)
SODIUM SERPL-SCNC: 140 MMOL/L (ref 133–144)

## 2022-04-06 PROCEDURE — 80053 COMPREHEN METABOLIC PANEL: CPT

## 2022-04-06 PROCEDURE — 99203 OFFICE O/P NEW LOW 30 MIN: CPT | Performed by: PHYSICIAN ASSISTANT

## 2022-04-06 PROCEDURE — 36415 COLL VENOUS BLD VENIPUNCTURE: CPT

## 2022-04-06 PROCEDURE — 83036 HEMOGLOBIN GLYCOSYLATED A1C: CPT

## 2022-04-06 NOTE — PROGRESS NOTES
[facility] Note  Encounter Date: Apr 6, 2022  Office Visit     Dermatology Problem List:  1. LTM - right calf - photo 4/6/22    Family history: negative for skin cancer  ____________________________________________    Assessment & Plan:    # 6 mm indurated papule with a central hemorraghic crust c/w a prurigo nodule vs dermatofibroma. Discuss the etiology of each lesion. Counseled treatment options including a shave biopsy or continue to monitor. After discussion, patient declined biopsy.   - RTC if it changes or has abnormal growth.   - Would consider shave removal if continues to be bothersome, patient declined today  - Photograph was obtained for clinical monitoring and inclusion in medical record.   - Continue to monitor.     # Cherry angioma(s).    - No further intervention needed.    # Multiple clinically benign nevi.  - Signs and Symptoms of non-melanoma skin cancer and ABCDEs of melanoma reviewed with patient. Patient encouraged to perform monthly self skin exams and educated on how to perform them. UV precautions reviewed with patient. Patient was asked about new or changing moles/lesions on body.   - Sunscreen: Apply 20 minutes prior to going outdoors and reapply every two hours, when wet or sweating. We recommend using an SPF 30 or higher, and to use one that is water resistant.       - No further intervention needed.     # Dermatofibroma - right upper thigh   -reassured    # Solar lentigines.  - Sunscreen: Apply 20 minutes prior to going outdoors and reapply every two hours, when wet or sweating. We recommend using an SPF 30 or higher, and to use one that is water resistant.      - No further intervention needed.       Procedures Performed:   None    Follow-up: 1 year(s) in-person, or earlier for new or changing lesions    Staff and Scribe:     Scribe Disclosure:   Nicolas HUTCHISON, am serving as a scribe to document services personally performed by Maria Dolores Kirkpatrick PA-C,  based on data collection and the provider's statements to me.      Provider Disclosure:   The documentation recorded by the scribe accurately reflects the services I personally performed and the decisions made by me.    All risks, benefits and alternatives were discussed with patient.  Patient is in agreement and understands the assessment and plan.  All questions were answered.    Maria Dolores Kirkpatrick PA-C, MPAS  [facility]: Phone: [phone], Fax: [phone]  [facility]: Phone: [phone],  Fax: [phone]  [facility]: Phone: [phone], Fax: [phone]  ____________________________________________    CC: Skin Check (has not seen derm/ no hx of sc/ area of concern back of R calf)    HPI:  Mr. Jose Luis Rae is a(n) 47 year old male who presents today as a new patient for a skin check.     Referred to derm for a skin lesion. Note on 12/7/21 reviewed. A/P notes a skin lesion on the right lower leg.     Today, patient notes concern on the right calf. Present for a few years now. Occassionally itch. He does pick at it often. Does not remember any bug bites or scab there.     He notes he did get sunburns during his childhood but not very often.     Patient is otherwise feeling well, without additional concerns.    Labs:  NA    Physical Exam:  Vitals: There were no vitals taken for this visit.  SKIN: Total skin excluding the undergarment areas was performed. The exam included the head/face, neck, both arms, chest, back, abdomen, both legs, digits and/or nails.   - Hendrickson Type l  - Right calf:  6 mm indurated papule with a central hemorraghic crust   - There are dome shaped bright red papules on the trunk and extremities.   - Multiple regular brown pigmented macules and papules are identified on the trunk and extremities. Less than a 100 nevi.    - Scattered brown macules on sun exposed areas.  -  There is a firm tan/flesh colored papule that dimples with lateral pressure on the right upper thigh..   - No other lesions of concern on areas examined.     Medications:  Current Outpatient Medications   Medication     aspirin (ASA) 81 MG tablet     atorvastatin (LIPITOR) 10 MG tablet     betamethasone dipropionate (DIPROSONE) 0.05 % external lotion     blood glucose monitoring (NO BRAND SPECIFIED) test strip     insulin pen needle (31G X 8 MM) 31G X 8 MM miscellaneous     liraglutide (VICTOZA) 18 MG/3ML solution     lisinopril (ZESTRIL) 20 MG tablet     metFORMIN (GLUCOPHAGE-XR) 500 MG 24 hr tablet     No current facility-administered medications for this visit.      Past Medical History:   Patient Active Problem List   Diagnosis     Abnormal liver function tests     Hypertriglyceridemia     Overweight     Type 2 diabetes mellitus with hyperglycemia, without long-term current use of insulin (H)     Kidney stone     Hepatomegaly     Hyperlipidemia with target LDL less than 100     Obesity (BMI 35.0-39.9) with comorbidity (H)     Past Medical History:   Diagnosis Date     Abnormal liver function tests 10/12/2012     Calculus of kidney      Diabetes (H)      Hypertriglyceridemia 10/12/2012     Overweight 10/12/2012        CC Melissa Liu, APRN CNP  [address] [place] [address] on close of this encounter.

## 2022-04-06 NOTE — NURSING NOTE
Jose Luis Rae's goals for this visit include:   Chief Complaint   Patient presents with     Skin Check     has not seen derm/ no hx of sc/ area of concern back of R calf     He requests these members of his care team be copied on today's visit information:     PCP: Melissa Liu    Referring Provider:  Melissa Liu, APRN CNP  [address] [place] [address]    There were no vitals taken for this visit.    Do you need any medication refills at today's visit? No    Shantelle Casas, ROSSANA on 4/6/2022 at 7:07 AM

## 2022-04-06 NOTE — LETTER
4/6/2022         RE: Jose Luis Rae  [address] [place] [address]        Dear Colleague,    Thank you for referring your patient, Jose Luis Rae, to the [facility]. Please see a copy of my visit note below.    [facility] Dermatology Note  Encounter Date: Apr 6, 2022  Office Visit     Dermatology Problem List:  1. LTM - right calf - photo 4/6/22    Family history: negative for skin cancer  ____________________________________________    Assessment & Plan:    # 6 mm indurated papule with a central hemorraghic crust c/w a prurigo nodule vs dermatofibroma. Discuss the etiology of each lesion. Counseled treatment options including a shave biopsy or continue to monitor. After discussion, patient declined biopsy.   - RTC if it changes or has abnormal growth.   - Would consider shave removal if continues to be bothersome, patient declined today  - Photograph was obtained for clinical monitoring and inclusion in medical record.   - Continue to monitor.     # Cherry angioma(s).    - No further intervention needed.    # Multiple clinically benign nevi.  - Signs and Symptoms of non-melanoma skin cancer and ABCDEs of melanoma reviewed with patient. Patient encouraged to perform monthly self skin exams and educated on how to perform them. UV precautions reviewed with patient. Patient was asked about new or changing moles/lesions on body.   - Sunscreen: Apply 20 minutes prior to going outdoors and reapply every two hours, when wet or sweating. We recommend using an SPF 30 or higher, and to use one that is water resistant.       - No further intervention needed.     # Dermatofibroma - right upper thigh   -reassured    # Solar lentigines.  - Sunscreen: Apply 20 minutes prior to going outdoors and reapply every two hours, when wet or sweating. We recommend using an SPF 30 or higher, and to use one that is water resistant.      - No further intervention needed.        Procedures Performed:   None    Follow-up: 1 year(s) in-person, or earlier for new or changing lesions    Staff and Scribe:     Scribe Disclosure:   I, Nicolas Saba, am serving as a scribe to document services personally performed by Maria Dolores Kirkpatrick PA-C, based on data collection and the provider's statements to me.      Provider Disclosure:   The documentation recorded by the scribe accurately reflects the services I personally performed and the decisions made by me.    All risks, benefits and alternatives were discussed with patient.  Patient is in agreement and understands the assessment and plan.  All questions were answered.    Maria Dolores Kirkpatrick PA-C, MPAS  [facility] [place]: Phone: [phone], Fax: [phone]  [facility]: Phone: [phone],  Fax: [phone]  [facility]: Phone: [phone], Fax: [phone]  ____________________________________________    CC: Skin Check (has not seen derm/ no hx of sc/ area of concern back of R calf)    HPI:  Mr. Jose Luis Rae is a(n) 47 year old male who presents today as a new patient for a skin check.     Referred to derm for a skin lesion. Note on 12/7/21 reviewed. A/P notes a skin lesion on the right lower leg.     Today, patient notes concern on the right calf. Present for a few years now. Occassionally itch. He does pick at it often. Does not remember any bug bites or scab there.     He notes he did get sunburns during his childhood but not very often.     Patient is otherwise feeling well, without additional concerns.    Labs:  NA    Physical Exam:  Vitals: There were no vitals taken for this visit.  SKIN: Total skin excluding the undergarment areas was performed. The exam included the head/face, neck, both arms, chest, back, abdomen, both legs, digits and/or nails.   - Hendrickson Type l  - Right calf:  6 mm indurated papule with a central  hemorraghic crust   - There are dome shaped bright red papules on the trunk and extremities.   - Multiple regular brown pigmented macules and papules are identified on the trunk and extremities. Less than a 100 nevi.    - Scattered brown macules on sun exposed areas.  - There is a firm tan/flesh colored papule that dimples with lateral pressure on the right upper thigh..   - No other lesions of concern on areas examined.     Medications:  Current Outpatient Medications   Medication     aspirin (ASA) 81 MG tablet     atorvastatin (LIPITOR) 10 MG tablet     betamethasone dipropionate (DIPROSONE) 0.05 % external lotion     blood glucose monitoring (NO BRAND SPECIFIED) test strip     insulin pen needle (31G X 8 MM) 31G X 8 MM miscellaneous     liraglutide (VICTOZA) 18 MG/3ML solution     lisinopril (ZESTRIL) 20 MG tablet     metFORMIN (GLUCOPHAGE-XR) 500 MG 24 hr tablet     No current facility-administered medications for this visit.      Past Medical History:   Patient Active Problem List   Diagnosis     Abnormal liver function tests     Hypertriglyceridemia     Overweight     Type 2 diabetes mellitus with hyperglycemia, without long-term current use of insulin (H)     Kidney stone     Hepatomegaly     Hyperlipidemia with target LDL less than 100     Obesity (BMI 35.0-39.9) with comorbidity (H)     Past Medical History:   Diagnosis Date     Abnormal liver function tests 10/12/2012     Calculus of kidney      Diabetes (H)      Hypertriglyceridemia 10/12/2012     Overweight 10/12/2012        CC LOUIE Salcedo CNP  [address] [place] [address] on close of this encounter.        Again, thank you for allowing me to participate in the care of your patient.        Sincerely,        Maria Dolores Kirkpatrick PA-C

## 2022-04-07 DIAGNOSIS — E11.65 TYPE 2 DIABETES MELLITUS WITH HYPERGLYCEMIA, WITHOUT LONG-TERM CURRENT USE OF INSULIN (H): Primary | ICD-10-CM

## 2022-04-07 NOTE — RESULT ENCOUNTER NOTE
Ekaterina Rae,    Attached are your test results.  -Liver and gallbladder tests (ALT,AST, Alk phos,bilirubin) are significantly elevated. ADVISE: little better   -Kidney function (GFR) is normal.  -Sodium is normal.  -Potassium is normal.  -Calcium is normal.  -Glucose is elevated due to your diabetes.  -A1C (test of diabetes control the last 2-3 months) is not at your goal. Talked to out Pharmacist and we are going to add on jardiance and see if that helps bring it down for you  Please recheck your A1C test in 3 months.    Please contact us if you have any questions.    Melissa Liu, CNP

## 2022-04-18 ENCOUNTER — TELEPHONE (OUTPATIENT)
Dept: FAMILY MEDICINE | Facility: CLINIC | Age: 47
End: 2022-04-18
Payer: COMMERCIAL

## 2022-04-18 NOTE — TELEPHONE ENCOUNTER
Prior Authorization Retail Medication Request    Medication/Dose: empagliflozin (JARDIANCE) 10 MG TABS tablet    Patient ID [id number]  Insurance contact [phone]

## 2022-04-20 NOTE — TELEPHONE ENCOUNTER
Central Prior Authorization Team   Phone: [phone]    PA Initiation    Medication: empagliflozin (JARDIANCE) 10 MG TABS tablet  Insurance Company: [employer] - Phone [phone] Fax [phone]  Pharmacy Filling the Rx: [facility] [address] [place] [id number] [facility]  Filling Pharmacy Phone: [phone]  Filling Pharmacy Fax:    Start Date: 4/20/2022

## 2022-04-22 NOTE — TELEPHONE ENCOUNTER
Prior Authorization Approval    Authorization Effective Date: 4/20/2022  Authorization Expiration Date: 12/31/2036  Medication: empagliflozin (JARDIANCE) 10 MG TABS tablet  Approved Dose/Quantity:    Reference #:     Insurance Company: [employer] - Phone [phone] Fax [phone]  Expected CoPay:       CoPay Card Available:      Foundation Assistance Needed:    Which Pharmacy is filling the prescription (Not needed for infusion/clinic administered): [facility] [address] [place] [id number] [facility]  Pharmacy Notified: Yes  Patient Notified: Yes  **Instructed pharmacy to notify patient when script is ready to /ship.**

## 2022-06-15 DIAGNOSIS — E11.65 TYPE 2 DIABETES MELLITUS WITH HYPERGLYCEMIA, WITHOUT LONG-TERM CURRENT USE OF INSULIN (H): ICD-10-CM

## 2022-06-16 RX ORDER — LIRAGLUTIDE 6 MG/ML
INJECTION SUBCUTANEOUS
Qty: 27 ML | Refills: 0 | Status: SHIPPED | OUTPATIENT
Start: 2022-06-16 | End: 2022-09-19

## 2022-09-15 ENCOUNTER — LAB (OUTPATIENT)
Dept: LAB | Facility: CLINIC | Age: 47
End: 2022-09-15
Payer: COMMERCIAL

## 2022-09-15 DIAGNOSIS — E11.65 TYPE 2 DIABETES MELLITUS WITH HYPERGLYCEMIA, WITHOUT LONG-TERM CURRENT USE OF INSULIN (H): ICD-10-CM

## 2022-09-15 LAB
ALBUMIN SERPL-MCNC: 4.3 G/DL (ref 3.4–5)
ALP SERPL-CCNC: 67 U/L (ref 40–150)
ALT SERPL W P-5'-P-CCNC: 93 U/L (ref 0–70)
ANION GAP SERPL CALCULATED.3IONS-SCNC: 8 MMOL/L (ref 3–14)
AST SERPL W P-5'-P-CCNC: 41 U/L (ref 0–45)
BILIRUB SERPL-MCNC: 1 MG/DL (ref 0.2–1.3)
BUN SERPL-MCNC: 18 MG/DL (ref 7–30)
CALCIUM SERPL-MCNC: 9.4 MG/DL (ref 8.5–10.1)
CHLORIDE BLD-SCNC: 106 MMOL/L (ref 94–109)
CO2 SERPL-SCNC: 25 MMOL/L (ref 20–32)
CREAT SERPL-MCNC: 0.81 MG/DL (ref 0.66–1.25)
GFR SERPL CREATININE-BSD FRML MDRD: >90 ML/MIN/1.73M2
GLUCOSE BLD-MCNC: 141 MG/DL (ref 70–99)
HBA1C MFR BLD: 7.1 % (ref 0–5.6)
POTASSIUM BLD-SCNC: 4.1 MMOL/L (ref 3.4–5.3)
PROT SERPL-MCNC: 7.5 G/DL (ref 6.8–8.8)
SODIUM SERPL-SCNC: 139 MMOL/L (ref 133–144)

## 2022-09-15 PROCEDURE — 83036 HEMOGLOBIN GLYCOSYLATED A1C: CPT

## 2022-09-15 PROCEDURE — 36415 COLL VENOUS BLD VENIPUNCTURE: CPT

## 2022-09-15 PROCEDURE — 80053 COMPREHEN METABOLIC PANEL: CPT

## 2022-09-16 NOTE — RESULT ENCOUNTER NOTE
Ekaterina Rae,    Attached are your test results.  -Liver and gallbladder tests (ALT,AST, Alk phos,bilirubin) improved   -Kidney function (GFR) is normal.  -Sodium is normal.  -Potassium is normal.  -Calcium is normal.  -Glucose is elevated due to your diabetes.  -A1C (test of diabetes control the last 2-3 months) is at your goal. Please recheck your A1C test in 3 months.    Please contact us if you have any questions.    Melissa Liu, CNP

## 2022-09-17 ENCOUNTER — HEALTH MAINTENANCE LETTER (OUTPATIENT)
Age: 47
End: 2022-09-17

## 2022-09-19 ENCOUNTER — VIRTUAL VISIT (OUTPATIENT)
Dept: FAMILY MEDICINE | Facility: CLINIC | Age: 47
End: 2022-09-19
Payer: COMMERCIAL

## 2022-09-19 DIAGNOSIS — R19.5 POSITIVE COLORECTAL CANCER SCREENING USING COLOGUARD TEST: ICD-10-CM

## 2022-09-19 DIAGNOSIS — Z12.11 SCREEN FOR COLON CANCER: ICD-10-CM

## 2022-09-19 DIAGNOSIS — E11.65 TYPE 2 DIABETES MELLITUS WITH HYPERGLYCEMIA, WITHOUT LONG-TERM CURRENT USE OF INSULIN (H): Primary | ICD-10-CM

## 2022-09-19 DIAGNOSIS — I10 HTN, GOAL BELOW 140/90: ICD-10-CM

## 2022-09-19 DIAGNOSIS — E78.5 HYPERLIPIDEMIA WITH TARGET LDL LESS THAN 100: ICD-10-CM

## 2022-09-19 DIAGNOSIS — R79.89 ABNORMAL LIVER FUNCTION TESTS: ICD-10-CM

## 2022-09-19 DIAGNOSIS — N18.1 CHRONIC KIDNEY DISEASE, STAGE 1: ICD-10-CM

## 2022-09-19 PROCEDURE — 99214 OFFICE O/P EST MOD 30 MIN: CPT | Mod: GT | Performed by: NURSE PRACTITIONER

## 2022-09-19 RX ORDER — ATORVASTATIN CALCIUM 10 MG/1
10 TABLET, FILM COATED ORAL DAILY
Qty: 90 TABLET | Refills: 3 | Status: SHIPPED | OUTPATIENT
Start: 2022-09-19 | End: 2023-09-25

## 2022-09-19 RX ORDER — METFORMIN HCL 500 MG
1000 TABLET, EXTENDED RELEASE 24 HR ORAL 2 TIMES DAILY WITH MEALS
Qty: 360 TABLET | Refills: 3 | Status: SHIPPED | OUTPATIENT
Start: 2022-09-19 | End: 2023-09-25

## 2022-09-19 RX ORDER — LIRAGLUTIDE 6 MG/ML
INJECTION SUBCUTANEOUS
Qty: 27 ML | Refills: 1 | Status: SHIPPED | OUTPATIENT
Start: 2022-09-19 | End: 2023-03-13

## 2022-09-19 RX ORDER — LISINOPRIL 20 MG/1
20 TABLET ORAL DAILY
Qty: 90 TABLET | Refills: 3 | Status: SHIPPED | OUTPATIENT
Start: 2022-09-19 | End: 2023-09-25
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
## 2022-09-19 NOTE — PATIENT INSTRUCTIONS
PLAN:   1.   Symptomatic therapy suggested: Continue current medications as prescribed.   2.  Orders Placed This Encounter   Medications    liraglutide (VICTOZA PEN) 18 MG/3ML solution     Sig: INJECT 1.8 MG SUBCUTANEOUSLY  ONCE DAILY     Dispense:  27 mL     Refill:  1    metFORMIN (GLUCOPHAGE XR) 500 MG 24 hr tablet     Sig: Take 2 tablets (1,000 mg) by mouth 2 times daily (with meals)     Dispense:  360 tablet     Refill:  3    lisinopril (ZESTRIL) 20 MG tablet     Sig: Take 1 tablet (20 mg) by mouth daily     Dispense:  90 tablet     Refill:  3    empagliflozin (JARDIANCE) 10 MG TABS tablet     Sig: Take 1 tablet (10 mg) by mouth daily     Dispense:  90 tablet     Refill:  1    atorvastatin (LIPITOR) 10 MG tablet     Sig: Take 1 tablet (10 mg) by mouth daily     Dispense:  90 tablet     Refill:  3       Orders Placed This Encounter   Procedures    REVIEW OF HEALTH MAINTENANCE PROTOCOL ORDERS    Comprehensive metabolic panel    Hemoglobin A1c    [facility] [facility]       3. Patient needs to follow up in if no improvement,or sooner if worsening of symptoms or other symptoms develop.  FUTURE LABS:       - Schedule non-fasting labs in 3 months  Follow up in 6 months for physical

## 2022-09-25 PROBLEM — N18.1 CHRONIC KIDNEY DISEASE, STAGE 1: Status: ACTIVE | Noted: 2022-09-25

## 2022-09-29 LAB — NONINV COLON CA DNA+OCC BLD SCRN STL QL: POSITIVE

## 2022-09-29 NOTE — RESULT ENCOUNTER NOTE
Ekaterina Rae,    Attached are your test results.  Cologuard is positive  -  ADVISE: scheduling a colonoscopy to check for possible colon cancer and an order has been sent to gastroenterology/endoscopy scheduling department - they will call you.    Please call [phone] to make appointment  if you do not hear from referrals in the next few days.      Please contact us if you have any questions.    Melissa Liu, CNP

## 2022-11-28 ENCOUNTER — TELEPHONE (OUTPATIENT)
Dept: GASTROENTEROLOGY | Facility: CLINIC | Age: 47
End: 2022-11-28

## 2022-11-28 NOTE — TELEPHONE ENCOUNTER
Screening Questions  BLUE  KIND OF PREP RED  LOCATION [review exclusion criteria] GREEN  SEDATION TYPE    Y Are you active on mychart?   Melissa Liu, LOUIE CNP  Ordering/Referring Provider?    [facility]  What type of coverage do you have?  N Have you had a positive covid test in the last 90 days?     37.9  1. BMI  [BMI 40+ - review exclusion criteria]    SELF  2. Are you able to give consent for your medical care? [IF NO,RN REVIEW]        N  3. Are you taking any prescription pain medications on a routine schedule?        N 3a. EXTENDED PREP What kind of prescription?     N 4. Do you have any chemical dependencies such as alcohol, street drugs, or methadone?    N 5. Do you have any history of post-traumatic stress syndrome, severe anxiety or history of psychosis?      **If yes 3- 5 , please schedule with MAC sedation.**          IF YES TO ANY 6 - 10 - HOSPITAL SETTING ONLY.     N 6.   Do you need assistance transferring?     N 7.   Have you had a heart or lung transplant?    N 8.   Are you currently on dialysis?   N 9.   Do you use daily home oxygen?   N 10. Do you take nitroglycerin?   10a. N If yes, how often?     11. [FEMALES]   Are you currently pregnant?     11a.  If yes, how many weeks? [ Greater than 12 weeks, OR NEEDED]    N 12. [review exclusion criteria]  Do you have any implantable devices in your body (pacemaker, defib, LVAD)?    N 13. Do you have Pulmonary Hypertension?             *NEED PAC APPT AT [facility]*     N 14. In the past 6 months, have you had any heart related issues including cardiomyopathy or heart attack?     N 14a. If yes, did it require cardiac stenting if so when?     N 15. Have you had a stroke or Transient ischemic attack (TIA - aka  mini stroke ) within 6 months?      N 16. Do you have mod to severe Obstructive Sleep Apnea?  [Hospital only - Ok at [place]]    N 17. Do you have SEVERE AND UNCONTROLLED asthma?              *NEED PAC APPT AT [facility]*     N 18. Are you currently taking  "any blood thinners?     18a. If yes, inform patient to \"follow up w/ ordering provider for bridging instructions.\"    N 19. Do you take the medication Phentermine?    19a. If yes, \"Hold for 7 days before procedure.  Please consult your prescribing provider if you have questions about holding this medication.\"     N  20. Do you have chronic kidney disease?      Y - DIABETIC   21. Do you have a diagnosis of diabetes?     N  22. On a regular basis do you go 3-5 days between bowel movements?     23. Preferred LOCAL Pharmacy for Pre Prescription    [ LIST ONLY ONE PHARMACY]     [facility] [address] [facility] [address] [facility]        - CLOSING REMINDERS -    Informed patient they will need an adult    Cannot take any type of public or medical transportation alone    Conscious Sedation- Needs  for 6 hours after the procedure       MAC/General-Needs  for 24 hours after procedure    Pre-Procedure Covid test to be completed [[facility] PCR Testing Required]    Confirmed Nurse will call to complete assessment       - SCHEDULING DETAILS -    Additional comments:  LISA JUDGE   Surgeon   02/06/2023  Date of Procedure  MODERATE  Sedation Type     N PAC / Pre-op Required   Location  [facility]       Type of Procedure Scheduled  Lower Endoscopy [Colonoscopy]      Which Colonoscopy Prep was Sent?     STANDARD GOLYTELY-If you answer yes to questions #8, #20, #21          "

## 2023-01-23 ENCOUNTER — HEALTH MAINTENANCE LETTER (OUTPATIENT)
Age: 48
End: 2023-01-23

## 2023-01-26 ENCOUNTER — TELEPHONE (OUTPATIENT)
Dept: GASTROENTEROLOGY | Facility: CLINIC | Age: 48
End: 2023-01-26

## 2023-01-26 DIAGNOSIS — Z12.11 ENCOUNTER FOR SCREENING COLONOSCOPY: Primary | ICD-10-CM

## 2023-01-26 RX ORDER — BISACODYL 5 MG
TABLET, DELAYED RELEASE (ENTERIC COATED) ORAL
Qty: 4 TABLET | Refills: 0 | Status: SHIPPED | OUTPATIENT
Start: 2023-01-26 | End: 2023-10-24

## 2023-01-26 NOTE — TELEPHONE ENCOUNTER
Pre assessment questions completed for upcoming Colonoscopy  procedure scheduled on 02.06.2023    COVID policy reviewed.     Pre-op scheduled  N/A    Reviewed procedural arrival time 0945, procedure time 1030 and facility location [facility]; [address], [place] [address]    Designated  policy reviewed. Instructed to have someone stay 6 hours post procedure.     Anticoagulation/blood thinners? No    Electronic implanted devices? No    Diabetic? Yes - Patient to hold oral diabetic medications day of procedure    Procedure indication: screening colonoscopy    Bowel prep recommendation: Standard Golytely     Reviewed procedure prep instructions.     Prep instructions sent via [employer].  Bowel prep script sent to [facility] [address] [place] [id number] [facility].     Patient verbalized understanding and had no questions or concerns at this time.    Hemalatha Pro RN  Endoscopy Procedure Pre Assessment RN

## 2023-02-06 ENCOUNTER — LAB (OUTPATIENT)
Dept: LAB | Facility: CLINIC | Age: 48
End: 2023-02-06
Payer: COMMERCIAL

## 2023-02-06 ENCOUNTER — TELEPHONE (OUTPATIENT)
Dept: GASTROENTEROLOGY | Facility: CLINIC | Age: 48
End: 2023-02-06

## 2023-02-06 ENCOUNTER — HOSPITAL ENCOUNTER (OUTPATIENT)
Facility: AMBULATORY SURGERY CENTER | Age: 48
Discharge: HOME | End: 2023-02-06
Attending: INTERNAL MEDICINE | Admitting: INTERNAL MEDICINE
Payer: COMMERCIAL

## 2023-02-06 VITALS
DIASTOLIC BLOOD PRESSURE: 89 MMHG | HEART RATE: 105 BPM | RESPIRATION RATE: 16 BRPM | SYSTOLIC BLOOD PRESSURE: 127 MMHG | TEMPERATURE: 97.2 F | OXYGEN SATURATION: 94 %

## 2023-02-06 DIAGNOSIS — Z13.0 SCREENING FOR DISORDER OF BLOOD AND BLOOD-FORMING ORGANS: ICD-10-CM

## 2023-02-06 DIAGNOSIS — Z13.220 SCREENING FOR HYPERLIPIDEMIA: ICD-10-CM

## 2023-02-06 DIAGNOSIS — N18.1 CHRONIC KIDNEY DISEASE, STAGE 1: ICD-10-CM

## 2023-02-06 DIAGNOSIS — E11.65 TYPE 2 DIABETES MELLITUS WITH HYPERGLYCEMIA, WITHOUT LONG-TERM CURRENT USE OF INSULIN (H): ICD-10-CM

## 2023-02-06 DIAGNOSIS — Z13.29 SCREENING FOR THYROID DISORDER: ICD-10-CM

## 2023-02-06 DIAGNOSIS — D12.5 ADENOMATOUS POLYP OF SIGMOID COLON: Primary | ICD-10-CM

## 2023-02-06 DIAGNOSIS — Z13.29 SCREENING FOR THYROID DISORDER: Primary | ICD-10-CM

## 2023-02-06 DIAGNOSIS — R79.89 ABNORMAL LIVER FUNCTION TESTS: ICD-10-CM

## 2023-02-06 DIAGNOSIS — R79.89 ABNORMAL LIVER FUNCTION TESTS: Primary | ICD-10-CM

## 2023-02-06 LAB
ALBUMIN SERPL-MCNC: 4.8 G/DL (ref 3.4–5)
ALP SERPL-CCNC: 71 U/L (ref 40–150)
ALT SERPL W P-5'-P-CCNC: 129 U/L (ref 0–70)
ANION GAP SERPL CALCULATED.3IONS-SCNC: 9 MMOL/L (ref 3–14)
AST SERPL W P-5'-P-CCNC: 82 U/L (ref 0–45)
BILIRUB SERPL-MCNC: 2.5 MG/DL (ref 0.2–1.3)
BUN SERPL-MCNC: 14 MG/DL (ref 7–30)
CALCIUM SERPL-MCNC: 9.6 MG/DL (ref 8.5–10.1)
CHLORIDE BLD-SCNC: 106 MMOL/L (ref 94–109)
CHOLEST SERPL-MCNC: 155 MG/DL
CO2 SERPL-SCNC: 27 MMOL/L (ref 20–32)
COLONOSCOPY: NORMAL
CREAT SERPL-MCNC: 0.77 MG/DL (ref 0.66–1.25)
CREAT UR-MCNC: 239 MG/DL
FASTING STATUS PATIENT QL REPORTED: YES
GFR SERPL CREATININE-BSD FRML MDRD: >90 ML/MIN/1.73M2
GLUCOSE BLD-MCNC: 140 MG/DL (ref 70–99)
GLUCOSE BLDC GLUCOMTR-MCNC: 167 MG/DL (ref 70–99)
HBA1C MFR BLD: 6.8 % (ref 0–5.6)
HDLC SERPL-MCNC: 41 MG/DL
LDLC SERPL CALC-MCNC: 75 MG/DL
MICROALBUMIN UR-MCNC: 173 MG/L
MICROALBUMIN/CREAT UR: 72.38 MG/G CR (ref 0–17)
NONHDLC SERPL-MCNC: 114 MG/DL
POTASSIUM BLD-SCNC: 3.7 MMOL/L (ref 3.4–5.3)
PROT SERPL-MCNC: 8.3 G/DL (ref 6.8–8.8)
SODIUM SERPL-SCNC: 142 MMOL/L (ref 133–144)
TRIGL SERPL-MCNC: 197 MG/DL

## 2023-02-06 PROCEDURE — 84443 ASSAY THYROID STIM HORMONE: CPT

## 2023-02-06 PROCEDURE — 36415 COLL VENOUS BLD VENIPUNCTURE: CPT

## 2023-02-06 PROCEDURE — 85027 COMPLETE CBC AUTOMATED: CPT

## 2023-02-06 PROCEDURE — 45385 COLONOSCOPY W/LESION REMOVAL: CPT

## 2023-02-06 PROCEDURE — 83036 HEMOGLOBIN GLYCOSYLATED A1C: CPT

## 2023-02-06 PROCEDURE — G8907 PT DOC NO EVENTS ON DISCHARG: HCPCS

## 2023-02-06 PROCEDURE — 82043 UR ALBUMIN QUANTITATIVE: CPT

## 2023-02-06 PROCEDURE — 82570 ASSAY OF URINE CREATININE: CPT

## 2023-02-06 PROCEDURE — G8918 PT W/O PREOP ORDER IV AB PRO: HCPCS

## 2023-02-06 PROCEDURE — 80061 LIPID PANEL: CPT

## 2023-02-06 PROCEDURE — 80053 COMPREHEN METABOLIC PANEL: CPT

## 2023-02-06 PROCEDURE — 88305 TISSUE EXAM BY PATHOLOGIST: CPT | Performed by: PATHOLOGY

## 2023-02-06 RX ORDER — SODIUM CHLORIDE, SODIUM LACTATE, POTASSIUM CHLORIDE, CALCIUM CHLORIDE 600; 310; 30; 20 MG/100ML; MG/100ML; MG/100ML; MG/100ML
INJECTION, SOLUTION INTRAVENOUS CONTINUOUS
Status: DISCONTINUED | OUTPATIENT
Start: 2023-02-06 | End: 2023-02-07 | Stop reason: HOSPADM

## 2023-02-06 RX ORDER — LIDOCAINE 40 MG/G
CREAM TOPICAL
Status: DISCONTINUED | OUTPATIENT
Start: 2023-02-06 | End: 2023-02-07 | Stop reason: HOSPADM

## 2023-02-06 RX ORDER — NALOXONE HYDROCHLORIDE 0.4 MG/ML
0.4 INJECTION, SOLUTION INTRAMUSCULAR; INTRAVENOUS; SUBCUTANEOUS
Status: DISCONTINUED | OUTPATIENT
Start: 2023-02-06 | End: 2023-02-07 | Stop reason: HOSPADM

## 2023-02-06 RX ORDER — FLUMAZENIL 0.1 MG/ML
0.2 INJECTION, SOLUTION INTRAVENOUS
Status: ACTIVE | OUTPATIENT
Start: 2023-02-06 | End: 2023-02-06

## 2023-02-06 RX ORDER — ONDANSETRON 2 MG/ML
4 INJECTION INTRAMUSCULAR; INTRAVENOUS
Status: DISCONTINUED | OUTPATIENT
Start: 2023-02-06 | End: 2023-02-07 | Stop reason: HOSPADM

## 2023-02-06 RX ORDER — FENTANYL CITRATE 50 UG/ML
INJECTION, SOLUTION INTRAMUSCULAR; INTRAVENOUS PRN
Status: DISCONTINUED | OUTPATIENT
Start: 2023-02-06 | End: 2023-02-06 | Stop reason: HOSPADM

## 2023-02-06 RX ORDER — NALOXONE HYDROCHLORIDE 0.4 MG/ML
0.2 INJECTION, SOLUTION INTRAMUSCULAR; INTRAVENOUS; SUBCUTANEOUS
Status: DISCONTINUED | OUTPATIENT
Start: 2023-02-06 | End: 2023-02-07 | Stop reason: HOSPADM

## 2023-02-06 RX ORDER — PROCHLORPERAZINE MALEATE 10 MG
10 TABLET ORAL EVERY 6 HOURS PRN
Status: DISCONTINUED | OUTPATIENT
Start: 2023-02-06 | End: 2023-02-07 | Stop reason: HOSPADM

## 2023-02-06 RX ORDER — ONDANSETRON 4 MG/1
4 TABLET, ORALLY DISINTEGRATING ORAL EVERY 6 HOURS PRN
Status: DISCONTINUED | OUTPATIENT
Start: 2023-02-06 | End: 2023-02-07 | Stop reason: HOSPADM

## 2023-02-06 RX ORDER — ONDANSETRON 2 MG/ML
4 INJECTION INTRAMUSCULAR; INTRAVENOUS EVERY 6 HOURS PRN
Status: DISCONTINUED | OUTPATIENT
Start: 2023-02-06 | End: 2023-02-07 | Stop reason: HOSPADM

## 2023-02-06 RX ADMIN — SODIUM CHLORIDE, SODIUM LACTATE, POTASSIUM CHLORIDE, CALCIUM CHLORIDE: 600; 310; 30; 20 INJECTION, SOLUTION INTRAVENOUS at 11:54

## 2023-02-06 NOTE — H&P
[facility] Anesthesia Pre-op History and Physical    Jose Luis Rae MRN# [id number]   Age: 48 year old YOB: 1975     Date of Exam 2/6/2023       Primary care provider: Melissa Liu         Chief Complaint and/or Reason for Procedure:     Positive colo-guard         Active problem list:     Patient Active Problem List    Diagnosis Date Noted     Chronic kidney disease, stage 1 09/25/2022     Priority: Medium     Obesity (BMI 35.0-39.9) with comorbidity (H) 01/01/2020     Priority: Medium     Hyperlipidemia with target LDL less than 100 06/12/2018     Priority: Medium     Hepatomegaly 12/12/2017     Priority: Medium     Kidney stone 09/24/2017     Priority: Medium     Type 2 diabetes mellitus with hyperglycemia, without long-term current use of insulin (H) 06/14/2017     Priority: Medium     Abnormal liver function tests 10/12/2012     Priority: Medium     Hypertriglyceridemia 10/12/2012     Priority: Medium     Overweight 10/12/2012     Priority: Medium            Medications (include herbals and vitamins):   Any Plavix use in the last 7 days? No     Current Outpatient Medications   Medication Sig     aspirin (ASA) 81 MG tablet Take 81 mg by mouth daily     atorvastatin (LIPITOR) 10 MG tablet Take 1 tablet (10 mg) by mouth daily     empagliflozin (JARDIANCE) 10 MG TABS tablet Take 1 tablet (10 mg) by mouth daily     liraglutide (VICTOZA PEN) 18 MG/3ML solution INJECT 1.8 MG SUBCUTANEOUSLY  ONCE DAILY     lisinopril (ZESTRIL) 20 MG tablet Take 1 tablet (20 mg) by mouth daily     metFORMIN (GLUCOPHAGE XR) 500 MG 24 hr tablet Take 2 tablets (1,000 mg) by mouth 2 times daily (with meals)     polyethylene glycol (GOLYTELY) 236 g suspension The night before the exam at 6 pm drink an 8-ounce glass every 15 minutes until the jug is half empty. If you arrive before 11 AM: Drink the other half of the [employer] jug at 11 PM night before procedure. If you arrive after 11 AM: Drink the other half  of the [employer] jug at 6 AM day of procedure. For additional instructions refer to your colonoscopy prep instructions.     betamethasone dipropionate (DIPROSONE) 0.05 % external lotion Apply topically 2 times daily     bisacodyl (DULCOLAX) 5 MG EC tablet Take 2 tablets at 3 pm the day before your procedure. If your procedure is before 11 am, take 2 additional tablets at 11 pm. If your procedure is after 11 am, take 2 additional tablets at 6 am. For additional instructions refer to your colonoscopy prep instructions.     blood glucose monitoring (NO BRAND SPECIFIED) test strip Needs One Touch Verio test strip. Use to test blood sugars 1-2 times daily or as directed     insulin pen needle (31G X 8 MM) 31G X 8 MM miscellaneous Use 1 pen needles once each week or as directed.     Current Facility-Administered Medications   Medication     lidocaine (LMX4) kit     lidocaine 1 % 0.1-1 mL     ondansetron (ZOFRAN) injection 4 mg     sodium chloride (PF) 0.9% PF flush 3 mL     sodium chloride (PF) 0.9% PF flush 3 mL             Allergies:      Allergies   Allergen Reactions     Tdap [Daptacel] Muscle Pain (Myalgia)     Allergy to Latex? No  Allergy to tape?   No  Intolerances:             Physical Exam:   All vitals have been reviewed  Patient Vitals for the past 8 hrs:   BP Temp Temp src Resp SpO2   02/06/23 0954 (!) 141/94 97.8  F (36.6  C) Temporal 16 96 %     No intake/output data recorded.  Lungs:   No increased work of breathing, good air exchange, clear to auscultation bilaterally, no crackles or wheezing     Cardiovascular:   RRR             Lab / Radiology Results:            Anesthetic risk and/or ASA classification:     2  Gisele Parr DO

## 2023-02-06 NOTE — TELEPHONE ENCOUNTER
Screening Questions  BLUE  KIND OF PREP RED  LOCATION [review exclusion criteria] GREEN  SEDATION TYPE     Y Are you active on mychart?   Melissa Liu, LOUIE CNP  Ordering/Referring Provider?    [facility]  What type of coverage do you have?  N Have you had a positive covid test in the last 90 days?      37.1  1. BMI  [BMI 40+ - review exclusion criteria]    SELF  2. Are you able to give consent for your medical care? [IF NO,RN REVIEW]        N  3. Are you taking any prescription pain medications on a routine schedule?                   N 3a. EXTENDED PREP What kind of prescription?      N 4. Do you have any chemical dependencies such as alcohol, street drugs, or methadone?    N 5. Do you have any history of post-traumatic stress syndrome, severe anxiety or history of psychosis?       **If yes 3- 5 , please schedule with MAC sedation.**           IF YES TO ANY 6 - 10 - HOSPITAL SETTING ONLY.     N 6.   Do you need assistance transferring?     N 7.   Have you had a heart or lung transplant?    N 8.   Are you currently on dialysis?   N 9.   Do you use daily home oxygen?   N 10. Do you take nitroglycerin?   10a. N If yes, how often?      11. [FEMALES]   Are you currently pregnant?     11a.  If yes, how many weeks? [ Greater than 12 weeks, OR NEEDED]     N 12. [review exclusion criteria]  Do you have any implantable devices in your body (pacemaker, defib, LVAD)?     N 13. Do you have Pulmonary Hypertension?             *NEED PAC APPT AT [facility]*      N 14. In the past 6 months, have you had any heart related issues including cardiomyopathy or heart attack?                 N 14a. If yes, did it require cardiac stenting if so when?      N 15. Have you had a stroke or Transient ischemic attack (TIA - aka  mini stroke ) within 6 months?       N 16. Do you have mod to severe Obstructive Sleep Apnea?  [Hospital only - Ok at [place]]     N 17. Do you have SEVERE AND UNCONTROLLED asthma?              *NEED PAC APPT AT [facility]*  "     N 18. Are you currently taking any blood thinners?                18a. If yes, inform patient to \"follow up w/ ordering provider for bridging instructions.\"    N 19. Do you take the medication Phentermine?               19a. If yes, \"Hold for 7 days before procedure.  Please consult your prescribing provider if you have questions about holding this medication.\"                 N  20. Do you have chronic kidney disease?       Y - DIABETIC   21. Do you have a diagnosis of diabetes?      N  22. On a regular basis do you go 3-5 days between bowel movements?      23. Preferred LOCAL Pharmacy for Pre Prescription    [ LIST ONLY ONE PHARMACY]     [facility] [address] [facility] [address] [facility]           - CLOSING REMINDERS -    Informed patient they will need an adult    Cannot take any type of public or medical transportation alone    Conscious Sedation- Needs  for 6 hours after the procedure       MAC/General-Needs  for 24 hours after procedure    Pre-Procedure Covid test to be completed [[facility] PCR Testing Required]    Confirmed Nurse will call to complete assessment         - SCHEDULING DETAILS -     Additional comments:  LISA JUDGE   Surgeon   4/11/2023  Date of Procedure  MAC PER ORDER   Sedation Type     N PAC / Pre-op Required   Location  [facility] [place]         Type of Procedure Scheduled  Lower Endoscopy [Colonoscopy]       Which Colonoscopy Prep was Sent?     GOLYTELY PER ORDER         "

## 2023-02-07 LAB
ERYTHROCYTE [DISTWIDTH] IN BLOOD BY AUTOMATED COUNT: 13.7 % (ref 10–15)
HCT VFR BLD AUTO: 54.5 % (ref 40–53)
HGB BLD-MCNC: 18.3 G/DL (ref 13.3–17.7)
MCH RBC QN AUTO: 30.2 PG (ref 26.5–33)
MCHC RBC AUTO-ENTMCNC: 33.6 G/DL (ref 31.5–36.5)
MCV RBC AUTO: 90 FL (ref 78–100)
PLATELET # BLD AUTO: 247 10E3/UL (ref 150–450)
RBC # BLD AUTO: 6.06 10E6/UL (ref 4.4–5.9)
TSH SERPL DL<=0.005 MIU/L-ACNC: 2.8 MU/L (ref 0.4–4)
WBC # BLD AUTO: 7.9 10E3/UL (ref 4–11)

## 2023-02-07 NOTE — RESULT ENCOUNTER NOTE
Ekaterina Rae,    Attached are your test results.  -Cholesterol levels are at your goal levels.  ADVISE: continuing your medication, a regular exercise program with at least 150 minutes of aerobic exercise per week, and eating a low saturated fat/low carbohydrate diet.  Also, you should recheck this fasting cholesterol panel in 12 months.  -Liver and gallbladder tests (ALT,AST, Alk phos,bilirubin) are significantly elevated. ADVISE: we need to recheck your abdomen ultrasound as liver test are elevated as well as bilirubin   I will place order. Please call [phone] to schedule.  -Kidney function (GFR) is normal.  -Sodium is normal.  -Potassium is normal.  -Calcium is normal.  -Glucose is elevated due to your diabetes.  -A1C (test of diabetes control the last 2-3 months) is at your goal. Please recheck your A1C test in 6 months. Great job!!  -Microalbumin (urine protein) level is elevated. This is suggestive of early damage to your kidneys from high blood pressure and diabetes.  ADVISE: avoiding anti-inflamatory agents such as ibuprofen (Advil, Motrin) or naproxen (Aleve) as much as possible, keeping your blood pressure in a normal range, and continuing your medication (lisinopril) that helps protect your kidneys.  Also, this should be rechecked in 1 year.    Please contact us if you have any questions.    Melissa Liu, CNP

## 2023-02-08 LAB
PATH REPORT.COMMENTS IMP SPEC: NORMAL
PATH REPORT.COMMENTS IMP SPEC: NORMAL
PATH REPORT.FINAL DX SPEC: NORMAL
PATH REPORT.GROSS SPEC: NORMAL
PATH REPORT.MICROSCOPIC SPEC OTHER STN: NORMAL
PATH REPORT.RELEVANT HX SPEC: NORMAL
PHOTO IMAGE: NORMAL

## 2023-02-11 NOTE — RESULT ENCOUNTER NOTE
Ekaterina Rae,    Attached are your test results.  -Elevated red blood cell (hgb) levels, I suspect because you were fasting , normal white blood cell count and normal platelet levels.  -TSH (thyroid stimulating hormone) level is normal which indicates normal thyroid function.   Please contact us if you have any questions.    Melissa Liu, CNP

## 2023-02-14 ENCOUNTER — OFFICE VISIT (OUTPATIENT)
Dept: FAMILY MEDICINE | Facility: CLINIC | Age: 48
End: 2023-02-14
Payer: COMMERCIAL

## 2023-02-14 VITALS
SYSTOLIC BLOOD PRESSURE: 128 MMHG | OXYGEN SATURATION: 96 % | HEIGHT: 66 IN | TEMPERATURE: 97.8 F | RESPIRATION RATE: 16 BRPM | WEIGHT: 239.1 LBS | HEART RATE: 101 BPM | DIASTOLIC BLOOD PRESSURE: 82 MMHG | BODY MASS INDEX: 38.42 KG/M2

## 2023-02-14 DIAGNOSIS — E11.65 TYPE 2 DIABETES MELLITUS WITH HYPERGLYCEMIA, WITHOUT LONG-TERM CURRENT USE OF INSULIN (H): Primary | ICD-10-CM

## 2023-02-14 DIAGNOSIS — R79.89 ABNORMAL LIVER FUNCTION TESTS: ICD-10-CM

## 2023-02-14 DIAGNOSIS — R00.0 TACHYCARDIA: ICD-10-CM

## 2023-02-14 DIAGNOSIS — R07.89 ATYPICAL CHEST PAIN: ICD-10-CM

## 2023-02-14 LAB
ALBUMIN SERPL-MCNC: 4.5 G/DL (ref 3.4–5)
ALP SERPL-CCNC: 71 U/L (ref 40–150)
ALT SERPL W P-5'-P-CCNC: 80 U/L (ref 0–70)
ANION GAP SERPL CALCULATED.3IONS-SCNC: 7 MMOL/L (ref 3–14)
AST SERPL W P-5'-P-CCNC: 32 U/L (ref 0–45)
BILIRUB SERPL-MCNC: 1.1 MG/DL (ref 0.2–1.3)
BUN SERPL-MCNC: 15 MG/DL (ref 7–30)
CALCIUM SERPL-MCNC: 9.9 MG/DL (ref 8.5–10.1)
CHLORIDE BLD-SCNC: 107 MMOL/L (ref 94–109)
CO2 SERPL-SCNC: 26 MMOL/L (ref 20–32)
CREAT SERPL-MCNC: 0.74 MG/DL (ref 0.66–1.25)
ERYTHROCYTE [DISTWIDTH] IN BLOOD BY AUTOMATED COUNT: 13.4 % (ref 10–15)
GFR SERPL CREATININE-BSD FRML MDRD: >90 ML/MIN/1.73M2
GLUCOSE BLD-MCNC: 125 MG/DL (ref 70–99)
HCT VFR BLD AUTO: 52.3 % (ref 40–53)
HGB BLD-MCNC: 17.8 G/DL (ref 13.3–17.7)
MCH RBC QN AUTO: 30.4 PG (ref 26.5–33)
MCHC RBC AUTO-ENTMCNC: 34 G/DL (ref 31.5–36.5)
MCV RBC AUTO: 89 FL (ref 78–100)
PLATELET # BLD AUTO: 242 10E3/UL (ref 150–450)
POTASSIUM BLD-SCNC: 3.8 MMOL/L (ref 3.4–5.3)
PROT SERPL-MCNC: 8.1 G/DL (ref 6.8–8.8)
RBC # BLD AUTO: 5.86 10E6/UL (ref 4.4–5.9)
SODIUM SERPL-SCNC: 140 MMOL/L (ref 133–144)
WBC # BLD AUTO: 7.9 10E3/UL (ref 4–11)

## 2023-02-14 PROCEDURE — 99214 OFFICE O/P EST MOD 30 MIN: CPT | Performed by: NURSE PRACTITIONER

## 2023-02-14 PROCEDURE — 85027 COMPLETE CBC AUTOMATED: CPT | Performed by: NURSE PRACTITIONER

## 2023-02-14 PROCEDURE — 80053 COMPREHEN METABOLIC PANEL: CPT | Performed by: NURSE PRACTITIONER

## 2023-02-14 PROCEDURE — 93000 ELECTROCARDIOGRAM COMPLETE: CPT | Performed by: NURSE PRACTITIONER

## 2023-02-14 PROCEDURE — 36415 COLL VENOUS BLD VENIPUNCTURE: CPT | Performed by: NURSE PRACTITIONER

## 2023-02-14 ASSESSMENT — PAIN SCALES - GENERAL: PAINLEVEL: NO PAIN (0)
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
## 2023-02-14 NOTE — PATIENT INSTRUCTIONS
PLAN:   1.   Symptomatic therapy suggested: Continue current medications as prescribed.   2.  Orders Placed This Encounter   Procedures    CBC with platelets    Comprehensive metabolic panel    EKG 12-lead complete w/read - Clinics    Echocardiogram Exercise Stress       3. Patient needs to follow up in if no improvement,or sooner if worsening of symptoms or other symptoms develop.  Stress test   I will place order. Please call [phone] to schedule.  Will follow up and/or notify patient of  results via My Chart to determine further need for followup

## 2023-02-14 NOTE — RESULT ENCOUNTER NOTE
Ekaterina Rae,    Attached are your test results.  -Normal red blood cell (hgb) levels, normal white blood cell count and normal platelet levels.   Please contact us if you have any questions.    Melissa Liu, CNP

## 2023-02-15 NOTE — RESULT ENCOUNTER NOTE
Ekaterina Rae,    Attached are your test results.  -Liver and gallbladder tests (ALT,AST, Alk phos,bilirubin) much improved   Keep up the weight loss   -Kidney function (GFR) is normal.  -Sodium is normal.  -Potassium is normal.  -Calcium is normal.  -Glucose is elevated due to your diabetes.   Please contact us if you have any questions.    Melissa Liu, CNP

## 2023-03-13 DIAGNOSIS — E11.65 TYPE 2 DIABETES MELLITUS WITH HYPERGLYCEMIA, WITHOUT LONG-TERM CURRENT USE OF INSULIN (H): ICD-10-CM

## 2023-03-13 RX ORDER — LIRAGLUTIDE 6 MG/ML
INJECTION SUBCUTANEOUS
Qty: 27 ML | Refills: 0 | Status: SHIPPED | OUTPATIENT
Start: 2023-03-13 | End: 2023-06-12

## 2023-03-14 ENCOUNTER — ANCILLARY PROCEDURE (OUTPATIENT)
Dept: CARDIOLOGY | Facility: CLINIC | Age: 48
End: 2023-03-14
Attending: NURSE PRACTITIONER
Payer: COMMERCIAL

## 2023-03-14 DIAGNOSIS — R07.89 ATYPICAL CHEST PAIN: ICD-10-CM

## 2023-03-14 PROCEDURE — 93016 CV STRESS TEST SUPVJ ONLY: CPT | Performed by: INTERNAL MEDICINE

## 2023-03-14 PROCEDURE — 93350 STRESS TTE ONLY: CPT | Performed by: INTERNAL MEDICINE

## 2023-03-14 PROCEDURE — 93321 DOPPLER ECHO F-UP/LMTD STD: CPT | Performed by: INTERNAL MEDICINE

## 2023-03-14 PROCEDURE — 93017 CV STRESS TEST TRACING ONLY: CPT | Performed by: INTERNAL MEDICINE

## 2023-03-14 PROCEDURE — 93018 CV STRESS TEST I&R ONLY: CPT | Performed by: INTERNAL MEDICINE

## 2023-03-14 PROCEDURE — 93325 DOPPLER ECHO COLOR FLOW MAPG: CPT | Performed by: INTERNAL MEDICINE

## 2023-03-14 RX ADMIN — Medication 6 ML: at 15:35

## 2023-03-15 NOTE — RESULT ENCOUNTER NOTE
Ekaterina Rae,    Attached are your test results.  Stress echo is normal which is reassuring    Please contact us if you have any questions.    Melissa Liu, CNP

## 2023-03-28 ENCOUNTER — TELEPHONE (OUTPATIENT)
Dept: GASTROENTEROLOGY | Facility: CLINIC | Age: 48
End: 2023-03-28

## 2023-03-28 DIAGNOSIS — Z86.0100 HISTORY OF COLONIC POLYPS: Primary | ICD-10-CM

## 2023-03-28 RX ORDER — BISACODYL 5 MG/1
TABLET, DELAYED RELEASE ORAL
Qty: 4 TABLET | Refills: 0 | Status: SHIPPED | OUTPATIENT
Start: 2023-03-28 | End: 2023-10-24

## 2023-03-28 NOTE — TELEPHONE ENCOUNTER
Attempted to contact patient regarding upcoming Colonoscopy  procedure on 4.11.2023 for pre assessment questions. No answer.     Left message to return call to [phone] #4    Discuss Covid policy and designated  policy.    Pre op exam? N/A    Arrival time: 1115. Procedure time: 1200    Facility location: [facility]; [address], [place] [address]    Sedation type: MAC    Anticoagulants: No    Electronic implanted devices? No    Diabetic? Yes - Patient to hold oral diabetic medications day of procedure    Indication for procedure: Adenomatous polyp of sigmoid colon    Bowel prep recommendation: Extended prep Golytely  per 2.6.2023 colonoscopy report.    Prep instructions sent via [employer]. Bowel prep script sent to [facility] [address] [place] [id number] [facility]      Shellie Parker RN  Endoscopy Procedure Pre Assessment RN

## 2023-03-31 NOTE — TELEPHONE ENCOUNTER
Pre assessment questions completed for upcoming Colonoscopy  procedure scheduled on 04.11.2023    COVID policy reviewed.     Reviewed procedural arrival time 1115 and facility location [facility]; [address], [place] [address]    Designated  policy reviewed. Instructed to have someone stay 24 hours post procedure.     Anticoagulation/blood thinners? No    Electronic implanted devices? No    Diabetic? Yes - Patient to hold oral diabetic medications day of procedure    Reviewed procedure prep instructions.     Patient verbalized understanding and had no questions or concerns at this time.    Hemalatha Pro, ELMER  Endoscopy Procedure Pre Assessment RN

## 2023-04-10 ENCOUNTER — ANESTHESIA EVENT (OUTPATIENT)
Dept: SURGERY | Facility: AMBULATORY SURGERY CENTER | Age: 48
End: 2023-04-10
Payer: COMMERCIAL

## 2023-04-11 ENCOUNTER — ANESTHESIA (OUTPATIENT)
Dept: SURGERY | Facility: AMBULATORY SURGERY CENTER | Age: 48
End: 2023-04-11
Payer: COMMERCIAL

## 2023-04-11 ENCOUNTER — HOSPITAL ENCOUNTER (OUTPATIENT)
Facility: AMBULATORY SURGERY CENTER | Age: 48
Discharge: HOME | End: 2023-04-11
Attending: INTERNAL MEDICINE
Payer: COMMERCIAL

## 2023-04-11 VITALS — HEART RATE: 93 BPM

## 2023-04-11 VITALS
OXYGEN SATURATION: 94 % | DIASTOLIC BLOOD PRESSURE: 70 MMHG | RESPIRATION RATE: 16 BRPM | SYSTOLIC BLOOD PRESSURE: 116 MMHG | HEART RATE: 92 BPM | TEMPERATURE: 97 F

## 2023-04-11 LAB — COLONOSCOPY: NORMAL

## 2023-04-11 PROCEDURE — 45385 COLONOSCOPY W/LESION REMOVAL: CPT

## 2023-04-11 PROCEDURE — G8918 PT W/O PREOP ORDER IV AB PRO: HCPCS

## 2023-04-11 PROCEDURE — G8907 PT DOC NO EVENTS ON DISCHARG: HCPCS

## 2023-04-11 PROCEDURE — 88305 TISSUE EXAM BY PATHOLOGIST: CPT | Performed by: PATHOLOGY

## 2023-04-11 PROCEDURE — 45380 COLONOSCOPY AND BIOPSY: CPT | Mod: XS

## 2023-04-11 PROCEDURE — 45381 COLONOSCOPY SUBMUCOUS NJX: CPT

## 2023-04-11 RX ORDER — NALOXONE HYDROCHLORIDE 0.4 MG/ML
0.2 INJECTION, SOLUTION INTRAMUSCULAR; INTRAVENOUS; SUBCUTANEOUS
Status: DISCONTINUED | OUTPATIENT
Start: 2023-04-11 | End: 2023-04-12 | Stop reason: HOSPADM

## 2023-04-11 RX ORDER — SODIUM CHLORIDE, SODIUM LACTATE, POTASSIUM CHLORIDE, CALCIUM CHLORIDE 600; 310; 30; 20 MG/100ML; MG/100ML; MG/100ML; MG/100ML
INJECTION, SOLUTION INTRAVENOUS CONTINUOUS PRN
Status: DISCONTINUED | OUTPATIENT
Start: 2023-04-11 | End: 2023-04-11

## 2023-04-11 RX ORDER — FLUMAZENIL 0.1 MG/ML
0.2 INJECTION, SOLUTION INTRAVENOUS
Status: DISCONTINUED | OUTPATIENT
Start: 2023-04-11 | End: 2023-04-12 | Stop reason: HOSPADM

## 2023-04-11 RX ORDER — EPINEPHRINE 1 MG/ML(1)
AMPUL (ML) INJECTION PRN
Status: DISCONTINUED | OUTPATIENT
Start: 2023-04-11 | End: 2023-04-11 | Stop reason: HOSPADM

## 2023-04-11 RX ORDER — PROCHLORPERAZINE MALEATE 10 MG
10 TABLET ORAL EVERY 6 HOURS PRN
Status: DISCONTINUED | OUTPATIENT
Start: 2023-04-11 | End: 2023-04-12 | Stop reason: HOSPADM

## 2023-04-11 RX ORDER — ONDANSETRON 2 MG/ML
4 INJECTION INTRAMUSCULAR; INTRAVENOUS EVERY 6 HOURS PRN
Status: DISCONTINUED | OUTPATIENT
Start: 2023-04-11 | End: 2023-04-12 | Stop reason: HOSPADM

## 2023-04-11 RX ORDER — LIDOCAINE HYDROCHLORIDE 20 MG/ML
INJECTION, SOLUTION INFILTRATION; PERINEURAL PRN
Status: DISCONTINUED | OUTPATIENT
Start: 2023-04-11 | End: 2023-04-11

## 2023-04-11 RX ORDER — ONDANSETRON 2 MG/ML
4 INJECTION INTRAMUSCULAR; INTRAVENOUS
Status: DISCONTINUED | OUTPATIENT
Start: 2023-04-11 | End: 2023-04-12 | Stop reason: HOSPADM

## 2023-04-11 RX ORDER — LIDOCAINE 40 MG/G
CREAM TOPICAL
Status: DISCONTINUED | OUTPATIENT
Start: 2023-04-11 | End: 2023-04-12 | Stop reason: HOSPADM

## 2023-04-11 RX ORDER — ONDANSETRON 4 MG/1
4 TABLET, ORALLY DISINTEGRATING ORAL EVERY 6 HOURS PRN
Status: DISCONTINUED | OUTPATIENT
Start: 2023-04-11 | End: 2023-04-12 | Stop reason: HOSPADM

## 2023-04-11 RX ORDER — NALOXONE HYDROCHLORIDE 0.4 MG/ML
0.4 INJECTION, SOLUTION INTRAMUSCULAR; INTRAVENOUS; SUBCUTANEOUS
Status: DISCONTINUED | OUTPATIENT
Start: 2023-04-11 | End: 2023-04-12 | Stop reason: HOSPADM

## 2023-04-11 RX ORDER — PROPOFOL 10 MG/ML
INJECTION, EMULSION INTRAVENOUS CONTINUOUS PRN
Status: DISCONTINUED | OUTPATIENT
Start: 2023-04-11 | End: 2023-04-11

## 2023-04-11 RX ADMIN — LIDOCAINE HYDROCHLORIDE 80 MG: 20 INJECTION, SOLUTION INFILTRATION; PERINEURAL at 13:05

## 2023-04-11 RX ADMIN — PROPOFOL 150 MCG/KG/MIN: 10 INJECTION, EMULSION INTRAVENOUS at 13:05

## 2023-04-11 RX ADMIN — SODIUM CHLORIDE, SODIUM LACTATE, POTASSIUM CHLORIDE, CALCIUM CHLORIDE: 600; 310; 30; 20 INJECTION, SOLUTION INTRAVENOUS at 12:55

## 2023-04-11 RX ADMIN — PROPOFOL 100 MG: 10 INJECTION, EMULSION INTRAVENOUS at 13:06
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
## 2023-04-11 NOTE — ANESTHESIA POSTPROCEDURE EVALUATION
Patient: Jose Luis Rae    Procedure: Procedure(s):  COLONOSCOPY, WITH CO2 INSUFFLATION  COLONOSCOPY, WITH POLYPECTOMY AND BIOPSY  COLONOSCOPY, WITH HEMORRHAGE CONTROL  COLONOSCOPY, FLEXIBLE, WITH LESION REMOVAL USING SNARE  TATTOOING, WITH COLONOSCOPY       Anesthesia Type:  MAC    Note:  Disposition: Outpatient   Postop Pain Control: Uneventful            Sign Out: Well controlled pain   PONV: No   Neuro/Psych: Uneventful            Sign Out: Acceptable/Baseline neuro status   Airway/Respiratory: Uneventful            Sign Out: Acceptable/Baseline resp. status   CV/Hemodynamics: Uneventful            Sign Out: Acceptable CV status; No obvious hypovolemia; No obvious fluid overload   Other NRE:    DID A NON-ROUTINE EVENT OCCUR? No           Last vitals:  Vitals Value Taken Time   /70 04/11/23 1404   Temp 97  F (36.1  C) 04/11/23 1349   Pulse     Resp 16 04/11/23 1404   SpO2 94 % 04/11/23 1404       Electronically Signed By: Viraj Blandon MD  April 11, 2023  4:03 PM

## 2023-04-11 NOTE — H&P
[facility] Anesthesia Pre-op History and Physical    Jose Luis Rae MRN# [id number]   Age: 48 year old YOB: 1975            Date of Exam 4/11/2023         Primary care provider: Melissa Liu         Chief Complaint and/or Reason for Procedure:     For therapy of known colon polyp         Active problem list:     Patient Active Problem List    Diagnosis Date Noted     Chronic kidney disease, stage 1 09/25/2022     Priority: Medium     Obesity (BMI 35.0-39.9) with comorbidity (H) 01/01/2020     Priority: Medium     Hyperlipidemia with target LDL less than 100 06/12/2018     Priority: Medium     Hepatomegaly 12/12/2017     Priority: Medium     Kidney stone 09/24/2017     Priority: Medium     Type 2 diabetes mellitus with hyperglycemia, without long-term current use of insulin (H) 06/14/2017     Priority: Medium     Abnormal liver function tests 10/12/2012     Priority: Medium     Hypertriglyceridemia 10/12/2012     Priority: Medium     Overweight 10/12/2012     Priority: Medium            Medications (include herbals and vitamins):   Any Plavix use in the last 7 days? No     Current Outpatient Medications   Medication Sig     aspirin (ASA) 81 MG tablet Take 81 mg by mouth daily     atorvastatin (LIPITOR) 10 MG tablet Take 1 tablet (10 mg) by mouth daily     empagliflozin (JARDIANCE) 10 MG TABS tablet Take 1 tablet (10 mg) by mouth daily     lisinopril (ZESTRIL) 20 MG tablet Take 1 tablet (20 mg) by mouth daily     metFORMIN (GLUCOPHAGE XR) 500 MG 24 hr tablet Take 2 tablets (1,000 mg) by mouth 2 times daily (with meals)     VICTOZA PEN 18 MG/3ML soln INJECT 1.8 MG SUBCUTANEOUSLY ONCE DAILY     betamethasone dipropionate (DIPROSONE) 0.05 % external lotion Apply topically 2 times daily     bisacodyl (DULCOLAX) 5 MG EC tablet 2 days prior to procedure, take 2 tablets at 4 pm. 1 day prior to procedure, take 2 tablets at 4 pm. For additional instructions refer to your colonoscopy prep  instructions.     bisacodyl (DULCOLAX) 5 MG EC tablet Take 2 tablets at 3 pm the day before your procedure. If your procedure is before 11 am, take 2 additional tablets at 11 pm. If your procedure is after 11 am, take 2 additional tablets at 6 am. For additional instructions refer to your colonoscopy prep instructions. (Patient not taking: Reported on 2/14/2023)     blood glucose monitoring (NO BRAND SPECIFIED) test strip Needs One Touch Verio test strip. Use to test blood sugars 1-2 times daily or as directed     insulin pen needle (31G X 8 MM) 31G X 8 MM miscellaneous Use 1 pen needles once each week or as directed.     polyethylene glycol (GOLYTELY) 236 g suspension 2 days prior at 5pm, mix and drink half of a jug of Golytely. Drink an 8 oz. glass of Golytely every 15 minutes until half of the jug is gone. Place remainder of Golytely in the refrigerator. 1 day prior at 5 pm, drink the 2nd half of a jug of Golytely bowel prep. 6 hours before your check-in time, drink an 8 oz. glass of Golytely every 15 minutes until half of the 2nd jug of Golytely is gone. Discard remainder of second jug.     polyethylene glycol (GOLYTELY) 236 g suspension The night before the exam at 6 pm drink an 8-ounce glass every 15 minutes until the jug is half empty. If you arrive before 11 AM: Drink the other half of the Golytely jug at 11 PM night before procedure. If you arrive after 11 AM: Drink the other half of the Golytely jug at 6 AM day of procedure. For additional instructions refer to your colonoscopy prep instructions. (Patient not taking: Reported on 2/14/2023)     Current Facility-Administered Medications   Medication     lidocaine (LMX4) kit     lidocaine 1 % 0.1-1 mL     ondansetron (ZOFRAN) injection 4 mg     sodium chloride (PF) 0.9% PF flush 3 mL     sodium chloride (PF) 0.9% PF flush 3 mL     Facility-Administered Medications Ordered in Other Encounters   Medication     lactated ringers infusion             Allergies:       Allergies   Allergen Reactions     Tdap [Daptacel] Muscle Pain (Myalgia)     Allergy to Latex? No  Allergy to tape?   No  Intolerances:             Physical Exam:   All vitals have been reviewed  Patient Vitals for the past 8 hrs:   BP Temp Temp src Pulse Resp SpO2   04/11/23 1141 121/86 98  F (36.7  C) Temporal 92 16 95 %     No intake/output data recorded.  Lungs:   No increased work of breathing, good air exchange, clear to auscultation bilaterally, no crackles or wheezing     Cardiovascular:   normal S1 and S2             Lab / Radiology Results:            Anesthetic risk and/or ASA classification:   2    Gisele Parr DO

## 2023-04-11 NOTE — ANESTHESIA CARE TRANSFER NOTE
Patient: Jose Luis Rae    Procedure: Procedure(s):  COLONOSCOPY, WITH CO2 INSUFFLATION  COLONOSCOPY, WITH POLYPECTOMY AND BIOPSY  COLONOSCOPY, WITH HEMORRHAGE CONTROL  COLONOSCOPY, FLEXIBLE, WITH LESION REMOVAL USING SNARE  TATTOOING, WITH COLONOSCOPY       Diagnosis: Adenomatous polyp of sigmoid colon [D12.5]  Diagnosis Additional Information: No value filed.    Anesthesia Type:   MAC     Note:    Oropharynx: oropharynx clear of all foreign objects and spontaneously breathing  Level of Consciousness: drowsy  Oxygen Supplementation: room air    Independent Airway: airway patency satisfactory and stable  Dentition: dentition unchanged  Vital Signs Stable: post-procedure vital signs reviewed and stable  Report to RN Given: handoff report given  Patient transferred to: Phase II    Handoff Report: Identifed the Patient, Identified the Reponsible Provider, Reviewed the pertinent medical history, Discussed the surgical course, Reviewed Intra-OP anesthesia mangement and issues during anesthesia, Set expectations for post-procedure period and Allowed opportunity for questions and acknowledgement of understanding      Vitals:  Vitals Value Taken Time   BP     Temp     Pulse 93 04/11/23 1344   Resp     SpO2         Electronically Signed By: LOUIE Sawyer CRNA  April 11, 2023  1:48 PM

## 2023-06-12 NOTE — PROGRESS NOTES
Ekaterina Rae,    Attached are your test results.  -Kidney function is normal (Cr, GFR), Sodium is normal, Potassium is normal, Calcium is normal,   Please contact us if you have any questions.    Melissa Liu, CNP   Bilateral Helical Rim Advancement Flap Text: A bilateral helical advancement flap was designed. Local anesthesia was obtained. The tissue surrounding the operative site was undermined extensively with blunt scissor dissection. A single incision along the helical sulcus was made to the underlying adipose tissue. The flap was then undermined along the cartilage in the subcutaneous fat and elevated. Hemostasis was obtained using electrocoagulation. The flap was then advanced into the primary defect. Raised tissue cones were removed as necessary by standard technique. The superficial fascia and subcutaneous layers were closed with buried vertical mattress sutures. The epidermis was then approximated with sutures. Careful attention was given to eversion and even approximation of the wound edges. A pressure dressing was applied.

## 2023-07-19 NOTE — TELEPHONE ENCOUNTER
Please let patient know will not cover Victoza   Wants us to have tried add on different type of medication first so sent in a script for Amaryl 2 mg a day and will recheck labs in 3 months after being on the medication    Detail Level: Zone

## 2023-09-20 ENCOUNTER — DOCUMENTATION ONLY (OUTPATIENT)
Dept: FAMILY MEDICINE | Facility: CLINIC | Age: 48
End: 2023-09-20

## 2023-09-20 DIAGNOSIS — E78.5 HYPERLIPIDEMIA WITH TARGET LDL LESS THAN 100: ICD-10-CM

## 2023-09-20 DIAGNOSIS — E11.65 TYPE 2 DIABETES MELLITUS WITH HYPERGLYCEMIA, WITHOUT LONG-TERM CURRENT USE OF INSULIN (H): Primary | ICD-10-CM

## 2023-09-20 DIAGNOSIS — I10 HTN, GOAL BELOW 140/90: ICD-10-CM

## 2023-09-20 DIAGNOSIS — N18.1 CHRONIC KIDNEY DISEASE, STAGE 1: ICD-10-CM

## 2023-09-20 ASSESSMENT — ENCOUNTER SYMPTOMS
MYALGIAS: 0
PARESTHESIAS: 0
DIARRHEA: 0
SORE THROAT: 0
HEMATURIA: 0
PALPITATIONS: 0
NAUSEA: 0
JOINT SWELLING: 0
NERVOUS/ANXIOUS: 0
HEARTBURN: 0
CHILLS: 0
ABDOMINAL PAIN: 0
SHORTNESS OF BREATH: 0
HEMATOCHEZIA: 0
HEADACHES: 0
EYE PAIN: 0
FREQUENCY: 0
WEAKNESS: 0
COUGH: 0
ARTHRALGIAS: 0
DYSURIA: 0
DIZZINESS: 0
CONSTIPATION: 0
FEVER: 0

## 2023-09-20 NOTE — PROGRESS NOTES
Jose Luis Rae came in for labs today with no lab orders advised patient could take extra tubes and hold blood. Patient stated would come back tomorrow 9/21/23 for lab work. Please place future lab orders for appointment.  Thank you   Sophie SPRAGUE     Future Appointments   Date Time Provider Department Center   9/25/2023  8:00 AM Melissa Liu APRN CNP [facility]

## 2023-09-20 NOTE — PROGRESS NOTES
Appointments in Next Year      Sep 25, 2023  8:00 AM  (Arrive by 7:40 AM)  Preventative Adult Visit with LOUIE Salcedo CNP  [facility] ([facility] [place] ) [phone]          Labs in

## 2023-09-21 ENCOUNTER — LAB (OUTPATIENT)
Dept: LAB | Facility: CLINIC | Age: 48
End: 2023-09-21
Payer: COMMERCIAL

## 2023-09-21 DIAGNOSIS — E11.65 TYPE 2 DIABETES MELLITUS WITH HYPERGLYCEMIA, WITHOUT LONG-TERM CURRENT USE OF INSULIN (H): ICD-10-CM

## 2023-09-21 DIAGNOSIS — N18.1 CHRONIC KIDNEY DISEASE, STAGE 1: ICD-10-CM

## 2023-09-21 DIAGNOSIS — E78.5 HYPERLIPIDEMIA WITH TARGET LDL LESS THAN 100: ICD-10-CM

## 2023-09-21 DIAGNOSIS — I10 HTN, GOAL BELOW 140/90: ICD-10-CM

## 2023-09-21 LAB
ALBUMIN SERPL BCG-MCNC: 4.5 G/DL (ref 3.5–5.2)
ALP SERPL-CCNC: 70 U/L (ref 40–129)
ALT SERPL W P-5'-P-CCNC: 62 U/L (ref 0–70)
ANION GAP SERPL CALCULATED.3IONS-SCNC: 13 MMOL/L (ref 7–15)
AST SERPL W P-5'-P-CCNC: 40 U/L (ref 0–45)
BILIRUB SERPL-MCNC: 1.2 MG/DL
BUN SERPL-MCNC: 14.8 MG/DL (ref 6–20)
CALCIUM SERPL-MCNC: 9.2 MG/DL (ref 8.6–10)
CHLORIDE SERPL-SCNC: 106 MMOL/L (ref 98–107)
CHOLEST SERPL-MCNC: 119 MG/DL
CREAT SERPL-MCNC: 0.77 MG/DL (ref 0.67–1.17)
CREAT UR-MCNC: 173 MG/DL
DEPRECATED HCO3 PLAS-SCNC: 22 MMOL/L (ref 22–29)
EGFRCR SERPLBLD CKD-EPI 2021: >90 ML/MIN/1.73M2
GLUCOSE SERPL-MCNC: 164 MG/DL (ref 70–99)
HBA1C MFR BLD: 7.3 % (ref 0–5.6)
HDLC SERPL-MCNC: 32 MG/DL
LDLC SERPL CALC-MCNC: 42 MG/DL
MICROALBUMIN UR-MCNC: 47.2 MG/L
MICROALBUMIN/CREAT UR: 27.28 MG/G CR (ref 0–17)
NONHDLC SERPL-MCNC: 87 MG/DL
POTASSIUM SERPL-SCNC: 4.1 MMOL/L (ref 3.4–5.3)
PROT SERPL-MCNC: 7 G/DL (ref 6.4–8.3)
SODIUM SERPL-SCNC: 141 MMOL/L (ref 136–145)
TRIGL SERPL-MCNC: 225 MG/DL
TSH SERPL DL<=0.005 MIU/L-ACNC: 3.03 UIU/ML (ref 0.3–4.2)

## 2023-09-21 PROCEDURE — 83036 HEMOGLOBIN GLYCOSYLATED A1C: CPT

## 2023-09-21 PROCEDURE — 80053 COMPREHEN METABOLIC PANEL: CPT

## 2023-09-21 PROCEDURE — 80061 LIPID PANEL: CPT

## 2023-09-21 PROCEDURE — 82043 UR ALBUMIN QUANTITATIVE: CPT

## 2023-09-21 PROCEDURE — 36415 COLL VENOUS BLD VENIPUNCTURE: CPT

## 2023-09-21 PROCEDURE — 82570 ASSAY OF URINE CREATININE: CPT

## 2023-09-21 PROCEDURE — 84443 ASSAY THYROID STIM HORMONE: CPT

## 2023-09-22 NOTE — RESULT ENCOUNTER NOTE
Ekaterina Rae,    Attached are your test results.  -Cholesterol levels are at your goal levels.  ADVISE: continuing your medication, a regular exercise program with at least 150 minutes of aerobic exercise per week, and eating a low saturated fat/low carbohydrate diet.  Also, you should recheck this fasting cholesterol panel in 12 months.  -Liver and gallbladder tests (ALT,AST, Alk phos,bilirubin) are normal.  -Kidney function (GFR) is normal.  -Sodium is normal.  -Potassium is normal.  -Calcium is normal.  -Glucose is elevated due to your diabetes.  -A1C (test of diabetes control the last 2-3 months) is above your goal.   You should recheck your A1c in 3 months. In the meantime, a healthy diet high in lean protein, whole grain carbohydrates and healthy fats such as olive oil or canola will help mainain a healthy blood sugar    A1c(%) Mean blood sugar (mg/dl)  6 135  7 170  8 205  9 240  10 275  11 310  12 345    Will review on Monday   Please recheck your A1C test in 3 months.   -TSH (thyroid stimulating hormone) level is normal which indicates normal thyroid function.  -Microalbumin (urine protein) level is elevated. This is suggestive of early damage to your kidneys from high blood pressure and diabetes.  ADVISE: avoiding anti-inflamatory agents such as ibuprofen (Advil, Motrin) or naproxen (Aleve) as much as possible, keeping your blood pressure in a normal range, and continuing your medication (lisinopril) that helps protect your kidneys.  Also, this should be rechecked in 1 year.    Please contact us if you have any questions.    Melissa Liu, CNP

## 2023-09-25 ENCOUNTER — OFFICE VISIT (OUTPATIENT)
Dept: FAMILY MEDICINE | Facility: CLINIC | Age: 48
End: 2023-09-25
Payer: COMMERCIAL

## 2023-09-25 VITALS
WEIGHT: 243 LBS | DIASTOLIC BLOOD PRESSURE: 85 MMHG | HEIGHT: 67 IN | BODY MASS INDEX: 38.14 KG/M2 | TEMPERATURE: 98.4 F | RESPIRATION RATE: 21 BRPM | SYSTOLIC BLOOD PRESSURE: 125 MMHG | OXYGEN SATURATION: 96 % | HEART RATE: 89 BPM

## 2023-09-25 DIAGNOSIS — E11.65 TYPE 2 DIABETES MELLITUS WITH HYPERGLYCEMIA, WITHOUT LONG-TERM CURRENT USE OF INSULIN (H): ICD-10-CM

## 2023-09-25 DIAGNOSIS — L30.9 DERMATITIS: ICD-10-CM

## 2023-09-25 DIAGNOSIS — I10 HTN, GOAL BELOW 140/90: ICD-10-CM

## 2023-09-25 DIAGNOSIS — Z00.00 ROUTINE GENERAL MEDICAL EXAMINATION AT A HEALTH CARE FACILITY: Primary | ICD-10-CM

## 2023-09-25 DIAGNOSIS — E78.5 HYPERLIPIDEMIA WITH TARGET LDL LESS THAN 100: ICD-10-CM

## 2023-09-25 PROCEDURE — 99213 OFFICE O/P EST LOW 20 MIN: CPT | Mod: 25 | Performed by: NURSE PRACTITIONER

## 2023-09-25 PROCEDURE — 99396 PREV VISIT EST AGE 40-64: CPT | Performed by: NURSE PRACTITIONER

## 2023-09-25 RX ORDER — LIRAGLUTIDE 6 MG/ML
1.8 INJECTION SUBCUTANEOUS DAILY
Qty: 27 ML | Refills: 3 | Status: SHIPPED | OUTPATIENT
Start: 2023-09-25 | End: 2024-07-25 | Stop reason: ALTCHOICE

## 2023-09-25 RX ORDER — METFORMIN HCL 500 MG
1000 TABLET, EXTENDED RELEASE 24 HR ORAL 2 TIMES DAILY WITH MEALS
Qty: 360 TABLET | Refills: 3 | Status: SHIPPED | OUTPATIENT
Start: 2023-09-25

## 2023-09-25 RX ORDER — BETAMETHASONE DIPROPIONATE 0.5 MG/G
LOTION TOPICAL 2 TIMES DAILY
Qty: 60 ML | Refills: 1 | Status: SHIPPED | OUTPATIENT
Start: 2023-09-25

## 2023-09-25 RX ORDER — LISINOPRIL 20 MG/1
20 TABLET ORAL DAILY
Qty: 90 TABLET | Refills: 3 | Status: SHIPPED | OUTPATIENT
Start: 2023-09-25

## 2023-09-25 RX ORDER — ATORVASTATIN CALCIUM 10 MG/1
10 TABLET, FILM COATED ORAL DAILY
Qty: 90 TABLET | Refills: 3 | Status: SHIPPED | OUTPATIENT
Start: 2023-09-25

## 2023-09-25 ASSESSMENT — ENCOUNTER SYMPTOMS
CONSTIPATION: 0
ABDOMINAL PAIN: 0
DIZZINESS: 0
HEARTBURN: 0
HEMATOCHEZIA: 0
MYALGIAS: 0
ARTHRALGIAS: 0
SORE THROAT: 0
SHORTNESS OF BREATH: 0
PALPITATIONS: 0
COUGH: 0
DIARRHEA: 0
DYSURIA: 0
FEVER: 0
NERVOUS/ANXIOUS: 0
CHILLS: 0
WEAKNESS: 0
HEMATURIA: 0
EYE PAIN: 0
JOINT SWELLING: 0
PARESTHESIAS: 0
NAUSEA: 0
FREQUENCY: 0
HEADACHES: 0

## 2023-09-25 ASSESSMENT — PAIN SCALES - GENERAL: PAINLEVEL: NO PAIN (0)

## 2023-09-25 NOTE — PATIENT INSTRUCTIONS
PLAN:   1.   Symptomatic therapy suggested: Continue current medications as prescribed.   2.  Orders Placed This Encounter   Medications    atorvastatin (LIPITOR) 10 MG tablet     Sig: Take 1 tablet (10 mg) by mouth daily     Dispense:  90 tablet     Refill:  3    betamethasone dipropionate (DIPROSONE) 0.05 % external lotion     Sig: Apply topically 2 times daily     Dispense:  60 mL     Refill:  1    empagliflozin (JARDIANCE) 10 MG TABS tablet     Sig: Take 1 tablet (10 mg) by mouth daily     Dispense:  90 tablet     Refill:  3    lisinopril (ZESTRIL) 20 MG tablet     Sig: Take 1 tablet (20 mg) by mouth daily     Dispense:  90 tablet     Refill:  3    metFORMIN (GLUCOPHAGE XR) 500 MG 24 hr tablet     Sig: Take 2 tablets (1,000 mg) by mouth 2 times daily (with meals)     Dispense:  360 tablet     Refill:  3    liraglutide (VICTOZA PEN) 18 MG/3ML solution     Sig: Inject 1.8 mg Subcutaneous daily     Dispense:  27 mL     Refill:  3     Orders Placed This Encounter   Procedures    REVIEW OF HEALTH MAINTENANCE PROTOCOL ORDERS    Basic metabolic panel    Hemoglobin A1c       3.  Nonfasting labs in 3 months   Work on weight loss  Regular exercise  Follow up in 6 months ok to do virtually   Will follow up and/or notify patient of  results via My Chart to determine further need for followup  Follow up office visit in one year for annual health maintenance exam, sooner PRN.   Patient needs to follow up in if no improvement,or sooner if worsening of symptoms or other symptoms develop.        Preventive Health Recommendations  Male Ages 40 to 49    Yearly exam:             See your health care provider every year in order to  o   Review health changes.   o   Discuss preventive care.    o   Review your medicines if your doctor has prescribed any.  You should be tested each year for STDs (sexually transmitted diseases) if you re at risk.   Have a cholesterol test every 5 years.   Have a colonoscopy (test for colon cancer) if  someone in your family has had colon cancer or polyps before age 50.   After age 45, have a diabetes test (fasting glucose). If you are at risk for diabetes, you should have this test every 3 years.    Talk with your health care provider about whether or not a prostate cancer screening test (PSA) is right for you.    Shots: Get a flu shot each year. Get a tetanus shot every 10 years.     Nutrition:  Eat at least 5 servings of fruits and vegetables daily.   Eat whole-grain bread, whole-wheat pasta and brown rice instead of white grains and rice.   Get adequate Calcium and Vitamin D.     Lifestyle  Exercise for at least 150 minutes a week (30 minutes a day, 5 days a week). This will help you control your weight and prevent disease.   Limit alcohol to one drink per day.   No smoking.   Wear sunscreen to prevent skin cancer.   See your dentist every six months for an exam and cleaning.       no chest pain, no cough, and no shortness of breath.
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
## 2023-10-02 NOTE — COMMUNITY RESOURCES LIST (ENGLISH)
10/02/2023   [facility] [place] - Outpatient Clinics  N/A  For additional resource needs, please contact your health insurance member services or your primary care team.  Phone: [phone]   Email: N/A   Address: [facility] [place] [address]   Hours: N/A        Housing       Coordinated Entry access point  1  [facility]  Office - [facility] Distance: 18.88 miles      Phone/Virtual   [address] [facility] [address] [place] [address]  Language: English  Hours: Mon - Fri 8:30 AM - 12:00 PM , Mon - Fri 1:00 PM - 4:00 PM  Fees: Free   Phone: (424) 141-4646 Ext.2 Email: [email or web address] Website: [email or web address]/[email or web address]/     Drop-in center or day shelter  2  [facility] Distance: 24.31 miles      In-Person   [address] [place] [address]  Language: English, Hmong, Sudanese, Ugandan  Hours: Mon - Thu 8:30 AM - 4:30 PM , Sat - Sun 9:00 AM - 12:00 PM  Fees: Free   Phone: (780) 934-2328 Email: info@[employer].org Website: https://[employer].org/     Housing search assistance  3  [employer] - Online housing search assistance Distance: 24.31 miles      Phone/Virtual   [address] [facility] [address] [place] [address]  Language: English, Hmong, Sudanese, Ugandan  Hours: Mon - Sun Open 24 Hours   Phone: (713) 148-2921 Email: info@housinglink.org Website: http://www.housinglink.org/     4  [facility] -  & [facility] Distance: 14.7 miles      Phone/Virtual   [address] [place] [address]  Language: English  Hours: Mon - Fri 8:00 AM - 4:30 PM  Fees: Free   Phone: (122) 321-9251 Email: [email or web address]. Website: [email or web address]://[email or web address]/Health-[email or web address]-Services     Shelter for individuals  5  [facility] Distance: 24.48 miles      In-Person   [address] [place] [address]   Language: English  Hours: Mon - Sun 5:00 PM - 10:00 AM  Fees: Free, Self Pay   Phone: (744) 746-2364 Email: info@[employer] Website: https://www.[employer]/locations/higher-ground-shelter/     6  [facility] Distance: 24.67 miles      In-Person   [address] [place] [address]  Language: English  Hours: Mon - Fri 4:00 PM - 9:00 AM  Fees: Free   Phone: (893) 633-3017 Email: [email or web address] Website: [email or web address]/[email or web address][[email or web address]/          Important Numbers & Websites       [address].org  Poison Control   (407) 998-2099 [place]  Suicide and Crisis Lifeline   [address] Healthline.org  Childhelp [place] Child Abuse Hotline   435.581.1749 Childhelphotline.org  National Sexual Assault Hotline   (162) 678-4239 (HOPE) Rainn.org  National Runaway Safeline   (827) 411-8802 (RUNAWAY) [facility] Hospital[facility]away.org  Pregnancy & Postpartum Support [place]   Call/text 625-113-9189 Ppsupportmn.org  Substance Abuse National Helpline ([facility]   576-515-HELP (1535) Findtreatment.gov  Emergency Services   911

## 2023-10-24 ENCOUNTER — TELEPHONE (OUTPATIENT)
Dept: FAMILY MEDICINE | Facility: CLINIC | Age: 48
End: 2023-10-24

## 2023-10-24 ENCOUNTER — VIRTUAL VISIT (OUTPATIENT)
Dept: FAMILY MEDICINE | Facility: CLINIC | Age: 48
End: 2023-10-24
Payer: COMMERCIAL

## 2023-10-24 DIAGNOSIS — J20.9 ACUTE BRONCHITIS WITH SYMPTOMS > 10 DAYS: Primary | ICD-10-CM

## 2023-10-24 DIAGNOSIS — E11.65 TYPE 2 DIABETES MELLITUS WITH HYPERGLYCEMIA, WITHOUT LONG-TERM CURRENT USE OF INSULIN (H): ICD-10-CM

## 2023-10-24 PROCEDURE — 99213 OFFICE O/P EST LOW 20 MIN: CPT | Mod: VID | Performed by: NURSE PRACTITIONER

## 2023-10-24 RX ORDER — DOXYCYCLINE 100 MG/1
100 CAPSULE ORAL 2 TIMES DAILY
Qty: 20 CAPSULE | Refills: 0 | Status: SHIPPED | OUTPATIENT
Start: 2023-10-24 | End: 2023-11-03

## 2023-10-24 RX ORDER — DOXYCYCLINE HYCLATE 100 MG
100 TABLET ORAL 2 TIMES DAILY
Qty: 20 TABLET | Refills: 0 | Status: SHIPPED | OUTPATIENT
Start: 2023-10-24 | End: 2023-11-03

## 2023-10-24 RX ORDER — ALBUTEROL SULFATE 90 UG/1
2 AEROSOL, METERED RESPIRATORY (INHALATION) EVERY 6 HOURS PRN
Qty: 18 G | Refills: 1 | Status: SHIPPED | OUTPATIENT
Start: 2023-10-24

## 2023-10-24 NOTE — PATIENT INSTRUCTIONS
PLAN:   1.   Symptomatic therapy suggested: rest, increase fluids, OTC Robitussin DM, and call prn if symptoms persist or worsen.  2.  Orders Placed This Encounter   Medications    doxycycline hyclate (VIBRA-TABS) 100 MG tablet     Sig: Take 1 tablet (100 mg) by mouth 2 times daily for 10 days     Dispense:  20 tablet     Refill:  0    albuterol (PROAIR HFA/PROVENTIL HFA/VENTOLIN HFA) 108 (90 Base) MCG/ACT inhaler     Sig: Inhale 2 puffs into the lungs every 6 hours as needed for shortness of breath, wheezing or cough     Dispense:  18 g     Refill:  1     Pharmacy may dispense brand covered by insurance (Proair, or proventil or ventolin or generic albuterol inhaler)     3.  Follow up in 1 week if not improving.   Patient needs to follow up in if no improvement,or sooner if worsening of symptoms or other symptoms develop.
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
## 2023-10-24 NOTE — TELEPHONE ENCOUNTER
[facility] pharmacy calling doxycycline hyclate (VIBRA-TABS) 100 MG tablet not covered by pt's insurance. Insurance will cover capsules.     Prescription pended and sent to provider for review.     Arcelia Son RN

## 2023-12-12 ENCOUNTER — PATIENT OUTREACH (OUTPATIENT)
Dept: GASTROENTEROLOGY | Facility: CLINIC | Age: 48
End: 2023-12-12
Payer: COMMERCIAL

## 2024-02-25 NOTE — LETTER
June 5, 2017       TO: Jose Luis Rae  [id number] [facility] [id number]       Mr. Rae, Your urine shows no sign of infection.  Thank you, Ambrose Vasquez    Resulted Orders   Basic metabolic panel   Result Value Ref Range    Sodium 138 133 - 144 mmol/L    Potassium 4.0 3.4 - 5.3 mmol/L    Chloride 105 94 - 109 mmol/L    Carbon Dioxide 24 20 - 32 mmol/L    Anion Gap 10 3 - 14 mmol/L    Glucose 309 (H) 70 - 99 mg/dL    Urea Nitrogen 14 7 - 30 mg/dL    Creatinine 0.77 0.66 - 1.25 mg/dL    GFR Estimate >90  Non  GFR Calc   >60 mL/min/1.7m2    GFR Estimate If Black >90   GFR Calc   >60 mL/min/1.7m2    Calcium 8.9 8.5 - 10.1 mg/dL   UA with Microscopic   Result Value Ref Range    Color Urine Straw     Appearance Urine Clear     Glucose Urine >499 (A) NEG mg/dL    Bilirubin Urine Negative NEG    Ketones Urine Negative NEG mg/dL    Specific Gravity Urine 1.015 1.003 - 1.035    Blood Urine Small (A) NEG    pH Urine 5.0 5.0 - 7.0 pH    Protein Albumin Urine Negative NEG mg/dL    Urobilinogen mg/dL 0.0 0.0 - 2.0 mg/dL    Nitrite Urine Negative NEG    Leukocyte Esterase Urine Negative NEG    Source Midstream Urine     WBC Urine 1 0 - 2 /HPF    RBC Urine 0 0 - 2 /HPF    Mucous Urine Present (A) NEG /LPF   Urine Culture Aerobic Bacterial   Result Value Ref Range    Specimen Description Midstream Urine     Special Requests Specimen received in preservative     Culture Micro No growth     Micro Report Status FINAL 06/03/2017    XR KUB    Narrative    Exam: XR KUB, 6/2/2017 4:13 PM    Indication: Calculus of kidney    Comparison: 5/28/2017.    Findings:   Two supine AP radiographs of the abdomen were obtained. Unchanged  position of calculus projecting just superior to the L4 left  transverse process, likely still within the left ureteropelvic  junction. Incomplete posterior fusion at S1. Nonobstructive bowel gas  pattern.      Impression    Impression:   Stable position of 6 mm  Maira @ Cardiology answering service placing callback per Dr. Madrigal   calculus at the left ureteropelvic junction.    I have personally reviewed the examination and initial interpretation  and I agree with the findings.    KIERA MCCONNELL MD

## 2024-04-18 NOTE — PROGRESS NOTES
Labs needed for pt 6-10  Thanks Radha SPRAGUE   
Labs pended. Routing to provider for review and approval.    Kaela Wiseman RN   [facility]    
Next 5 appointments (look out 90 days)    Jun 13, 2019  3:30 PM CDT  [facility] C[facility] [id number] [place] [id number] [phone]          
[id number]

## 2024-05-03 ENCOUNTER — LAB (OUTPATIENT)
Dept: LAB | Facility: CLINIC | Age: 49
End: 2024-05-03
Payer: COMMERCIAL

## 2024-05-03 DIAGNOSIS — E11.65 TYPE 2 DIABETES MELLITUS WITH HYPERGLYCEMIA, WITHOUT LONG-TERM CURRENT USE OF INSULIN (H): Primary | ICD-10-CM

## 2024-05-03 LAB
ANION GAP SERPL CALCULATED.3IONS-SCNC: 13 MMOL/L (ref 7–15)
BUN SERPL-MCNC: 15.7 MG/DL (ref 6–20)
CALCIUM SERPL-MCNC: 9.8 MG/DL (ref 8.6–10)
CHLORIDE SERPL-SCNC: 104 MMOL/L (ref 98–107)
CREAT SERPL-MCNC: 0.8 MG/DL (ref 0.67–1.17)
DEPRECATED HCO3 PLAS-SCNC: 24 MMOL/L (ref 22–29)
EGFRCR SERPLBLD CKD-EPI 2021: >90 ML/MIN/1.73M2
GLUCOSE SERPL-MCNC: 136 MG/DL (ref 70–99)
HBA1C MFR BLD: 7.1 % (ref 0–5.6)
POTASSIUM SERPL-SCNC: 4.2 MMOL/L (ref 3.4–5.3)
SODIUM SERPL-SCNC: 141 MMOL/L (ref 135–145)

## 2024-05-03 PROCEDURE — 36415 COLL VENOUS BLD VENIPUNCTURE: CPT

## 2024-05-03 PROCEDURE — 80048 BASIC METABOLIC PNL TOTAL CA: CPT

## 2024-05-03 PROCEDURE — 83036 HEMOGLOBIN GLYCOSYLATED A1C: CPT

## 2024-05-03 NOTE — RESULT ENCOUNTER NOTE
Ekaterina Rae,    Attached are your test results.  -Kidney function (GFR) is normal.  -Sodium is normal.  -Potassium is normal.  -Calcium is normal.  -Glucose is elevated due to your diabetes.  -A1C (test of diabetes control the last 2-3 months) is at your goal. Please continue with your current plan.   Please contact us if you have any questions.    Melissa Liu, CNP     within normal limits

## 2024-05-07 ENCOUNTER — OFFICE VISIT (OUTPATIENT)
Dept: FAMILY MEDICINE | Facility: CLINIC | Age: 49
End: 2024-05-07
Payer: COMMERCIAL

## 2024-05-07 VITALS
DIASTOLIC BLOOD PRESSURE: 87 MMHG | TEMPERATURE: 97.4 F | BODY MASS INDEX: 37.67 KG/M2 | WEIGHT: 240 LBS | RESPIRATION RATE: 14 BRPM | SYSTOLIC BLOOD PRESSURE: 128 MMHG | HEART RATE: 94 BPM | OXYGEN SATURATION: 96 % | HEIGHT: 67 IN

## 2024-05-07 DIAGNOSIS — I10 HTN, GOAL BELOW 140/90: ICD-10-CM

## 2024-05-07 DIAGNOSIS — N18.1 CHRONIC KIDNEY DISEASE, STAGE 1: ICD-10-CM

## 2024-05-07 DIAGNOSIS — E78.5 HYPERLIPIDEMIA WITH TARGET LDL LESS THAN 100: ICD-10-CM

## 2024-05-07 DIAGNOSIS — E11.65 TYPE 2 DIABETES MELLITUS WITH HYPERGLYCEMIA, WITHOUT LONG-TERM CURRENT USE OF INSULIN (H): Primary | ICD-10-CM

## 2024-05-07 PROCEDURE — G2211 COMPLEX E/M VISIT ADD ON: HCPCS | Performed by: NURSE PRACTITIONER

## 2024-05-07 PROCEDURE — 99214 OFFICE O/P EST MOD 30 MIN: CPT | Performed by: NURSE PRACTITIONER

## 2024-05-07 ASSESSMENT — PAIN SCALES - GENERAL: PAINLEVEL: NO PAIN (0)

## 2024-05-07 NOTE — PATIENT INSTRUCTIONS
PLAN:   1.   Symptomatic therapy suggested: Continue current medications as prescribed.    Check on ozempic and Mounjaro   2.  Work on weight loss  Regular exercise  Follow up in 6 months.   Patient needs to follow up in if no improvement,or sooner if worsening of symptoms or other symptoms develop.
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
## 2024-07-30 ENCOUNTER — TELEPHONE (OUTPATIENT)
Dept: FAMILY MEDICINE | Facility: CLINIC | Age: 49
End: 2024-07-30
Payer: COMMERCIAL

## 2024-07-30 NOTE — TELEPHONE ENCOUNTER
Prior Authorization Retail Medication Request    Medication/Dose: Victoza 18MG/ML 1 PEN=3MLS INJ  Diagnosis and ICD code (if different than what is on RX):    New/renewal/insurance change PA/secondary ins. PA:  Previously Tried and Failed:    Rationale:      Insurance   Primary: [employer]  Insurance ID:  [id number]    Pharmacy Information (if different than what is on RX)  Name:  [facility] [address] [place] [address]  Phone:  [phone]  Fax:N/A    Andrew Jernigan MA

## 2024-07-31 NOTE — TELEPHONE ENCOUNTER
Retail Pharmacy Prior Authorization Team   Phone: [phone]    PA Initiation    Medication: VICTOZA 18 MG/3ML SC SOPN  Insurance Company: [employer] - Phone [phone] Fax [phone]  Pharmacy Filling the Rx:  [id number]  Filling Pharmacy Phone:  [phone]   Filling Pharmacy Fax:    Start Date: 7/31/2024

## 2024-08-06 NOTE — TELEPHONE ENCOUNTER
Prior Authorization Not Needed -Rx changed to Ozempic, per patient request. PA has been withdrawn    Medication: VICTOZA 18 MG/3ML SC SOPN  Insurance Company: [employer] - Phone [phone] Fax [phone]  Expected CoPay: $    Pharmacy Filling the Rx: [facility] [id number] [facility]  Pharmacy Notified:   Patient Notified:

## 2024-08-30 ENCOUNTER — MYC REFILL (OUTPATIENT)
Dept: FAMILY MEDICINE | Facility: CLINIC | Age: 49
End: 2024-08-30
Payer: COMMERCIAL

## 2024-08-30 DIAGNOSIS — E11.65 TYPE 2 DIABETES MELLITUS WITH HYPERGLYCEMIA, WITHOUT LONG-TERM CURRENT USE OF INSULIN (H): ICD-10-CM

## 2024-09-02 DIAGNOSIS — E11.65 TYPE 2 DIABETES MELLITUS WITH HYPERGLYCEMIA, WITHOUT LONG-TERM CURRENT USE OF INSULIN (H): ICD-10-CM

## 2024-09-02 RX ORDER — EMPAGLIFLOZIN 10 MG/1
10 TABLET, FILM COATED ORAL DAILY
Qty: 90 TABLET | Refills: 0 | Status: SHIPPED | OUTPATIENT
Start: 2024-09-02

## 2024-09-28 ENCOUNTER — MYC REFILL (OUTPATIENT)
Dept: FAMILY MEDICINE | Facility: CLINIC | Age: 49
End: 2024-09-28
Payer: COMMERCIAL

## 2024-09-28 DIAGNOSIS — E11.65 TYPE 2 DIABETES MELLITUS WITH HYPERGLYCEMIA, WITHOUT LONG-TERM CURRENT USE OF INSULIN (H): ICD-10-CM

## 2024-09-30 NOTE — TELEPHONE ENCOUNTER
Prescription approved per [facility] Refill Protocol.  Barbara Hunter RN  [facility]

## 2024-11-16 SDOH — HEALTH STABILITY: PHYSICAL HEALTH: ON AVERAGE, HOW MANY DAYS PER WEEK DO YOU ENGAGE IN MODERATE TO STRENUOUS EXERCISE (LIKE A BRISK WALK)?: 3 DAYS

## 2024-11-16 SDOH — HEALTH STABILITY: PHYSICAL HEALTH: ON AVERAGE, HOW MANY MINUTES DO YOU ENGAGE IN EXERCISE AT THIS LEVEL?: 40 MIN

## 2024-11-16 ASSESSMENT — SOCIAL DETERMINANTS OF HEALTH (SDOH): HOW OFTEN DO YOU GET TOGETHER WITH FRIENDS OR RELATIVES?: ONCE A WEEK

## 2024-11-19 ENCOUNTER — OFFICE VISIT (OUTPATIENT)
Dept: FAMILY MEDICINE | Facility: CLINIC | Age: 49
End: 2024-11-19
Attending: NURSE PRACTITIONER
Payer: COMMERCIAL

## 2024-11-19 VITALS
WEIGHT: 235 LBS | BODY MASS INDEX: 37.77 KG/M2 | RESPIRATION RATE: 16 BRPM | SYSTOLIC BLOOD PRESSURE: 126 MMHG | HEART RATE: 80 BPM | TEMPERATURE: 98.1 F | HEIGHT: 66 IN | DIASTOLIC BLOOD PRESSURE: 89 MMHG | OXYGEN SATURATION: 95 %

## 2024-11-19 DIAGNOSIS — E11.65 TYPE 2 DIABETES MELLITUS WITH HYPERGLYCEMIA, WITHOUT LONG-TERM CURRENT USE OF INSULIN (H): ICD-10-CM

## 2024-11-19 DIAGNOSIS — Z13.29 SCREENING FOR THYROID DISORDER: ICD-10-CM

## 2024-11-19 DIAGNOSIS — Z12.5 SCREENING FOR PROSTATE CANCER: ICD-10-CM

## 2024-11-19 DIAGNOSIS — Z13.0 SCREENING FOR DISORDER OF BLOOD AND BLOOD-FORMING ORGANS: ICD-10-CM

## 2024-11-19 DIAGNOSIS — E78.5 HYPERLIPIDEMIA WITH TARGET LDL LESS THAN 100: ICD-10-CM

## 2024-11-19 DIAGNOSIS — Z00.00 ROUTINE GENERAL MEDICAL EXAMINATION AT A HEALTH CARE FACILITY: Primary | ICD-10-CM

## 2024-11-19 DIAGNOSIS — I10 HTN, GOAL BELOW 140/90: ICD-10-CM

## 2024-11-19 LAB
ALBUMIN SERPL BCG-MCNC: 4.5 G/DL (ref 3.5–5.2)
ALP SERPL-CCNC: 82 U/L (ref 40–150)
ALT SERPL W P-5'-P-CCNC: 71 U/L (ref 0–70)
ANION GAP SERPL CALCULATED.3IONS-SCNC: 14 MMOL/L (ref 7–15)
AST SERPL W P-5'-P-CCNC: 40 U/L (ref 0–45)
BILIRUB SERPL-MCNC: 1 MG/DL
BUN SERPL-MCNC: 12.8 MG/DL (ref 6–20)
CALCIUM SERPL-MCNC: 9.7 MG/DL (ref 8.8–10.4)
CHLORIDE SERPL-SCNC: 106 MMOL/L (ref 98–107)
CHOLEST SERPL-MCNC: 132 MG/DL
CREAT SERPL-MCNC: 0.74 MG/DL (ref 0.67–1.17)
CREAT UR-MCNC: 91.3 MG/DL
EGFRCR SERPLBLD CKD-EPI 2021: >90 ML/MIN/1.73M2
ERYTHROCYTE [DISTWIDTH] IN BLOOD BY AUTOMATED COUNT: 13.5 % (ref 10–15)
EST. AVERAGE GLUCOSE BLD GHB EST-MCNC: 151 MG/DL
FASTING STATUS PATIENT QL REPORTED: YES
FASTING STATUS PATIENT QL REPORTED: YES
GLUCOSE SERPL-MCNC: 110 MG/DL (ref 70–99)
HBA1C MFR BLD: 6.9 % (ref 0–5.6)
HCO3 SERPL-SCNC: 22 MMOL/L (ref 22–29)
HCT VFR BLD AUTO: 50.9 % (ref 40–53)
HDLC SERPL-MCNC: 34 MG/DL
HGB BLD-MCNC: 17.2 G/DL (ref 13.3–17.7)
LDLC SERPL CALC-MCNC: 71 MG/DL
MCH RBC QN AUTO: 30.2 PG (ref 26.5–33)
MCHC RBC AUTO-ENTMCNC: 33.8 G/DL (ref 31.5–36.5)
MCV RBC AUTO: 89 FL (ref 78–100)
MICROALBUMIN UR-MCNC: 28 MG/L
MICROALBUMIN/CREAT UR: 30.67 MG/G CR (ref 0–17)
NONHDLC SERPL-MCNC: 98 MG/DL
PLATELET # BLD AUTO: 206 10E3/UL (ref 150–450)
POTASSIUM SERPL-SCNC: 4.2 MMOL/L (ref 3.4–5.3)
PROT SERPL-MCNC: 7.2 G/DL (ref 6.4–8.3)
PSA SERPL DL<=0.01 NG/ML-MCNC: 0.63 NG/ML (ref 0–2.5)
RBC # BLD AUTO: 5.7 10E6/UL (ref 4.4–5.9)
SODIUM SERPL-SCNC: 142 MMOL/L (ref 135–145)
TRIGL SERPL-MCNC: 137 MG/DL
TSH SERPL DL<=0.005 MIU/L-ACNC: 4.2 UIU/ML (ref 0.3–4.2)
WBC # BLD AUTO: 7.9 10E3/UL (ref 4–11)

## 2024-11-19 PROCEDURE — 84443 ASSAY THYROID STIM HORMONE: CPT | Performed by: NURSE PRACTITIONER

## 2024-11-19 PROCEDURE — 80061 LIPID PANEL: CPT | Performed by: NURSE PRACTITIONER

## 2024-11-19 PROCEDURE — 82570 ASSAY OF URINE CREATININE: CPT | Performed by: NURSE PRACTITIONER

## 2024-11-19 PROCEDURE — 99214 OFFICE O/P EST MOD 30 MIN: CPT | Mod: 25 | Performed by: NURSE PRACTITIONER

## 2024-11-19 PROCEDURE — 83036 HEMOGLOBIN GLYCOSYLATED A1C: CPT | Performed by: NURSE PRACTITIONER

## 2024-11-19 PROCEDURE — 85027 COMPLETE CBC AUTOMATED: CPT | Performed by: NURSE PRACTITIONER

## 2024-11-19 PROCEDURE — 82043 UR ALBUMIN QUANTITATIVE: CPT | Performed by: NURSE PRACTITIONER

## 2024-11-19 PROCEDURE — 99207 PR FOOT EXAM NO CHARGE: CPT | Performed by: NURSE PRACTITIONER

## 2024-11-19 PROCEDURE — 80053 COMPREHEN METABOLIC PANEL: CPT | Performed by: NURSE PRACTITIONER

## 2024-11-19 PROCEDURE — G0103 PSA SCREENING: HCPCS | Performed by: NURSE PRACTITIONER

## 2024-11-19 PROCEDURE — 36415 COLL VENOUS BLD VENIPUNCTURE: CPT | Performed by: NURSE PRACTITIONER

## 2024-11-19 PROCEDURE — 99396 PREV VISIT EST AGE 40-64: CPT | Performed by: NURSE PRACTITIONER

## 2024-11-19 RX ORDER — ATORVASTATIN CALCIUM 10 MG/1
10 TABLET, FILM COATED ORAL DAILY
Qty: 90 TABLET | Refills: 3 | Status: SHIPPED | OUTPATIENT
Start: 2024-11-19

## 2024-11-19 RX ORDER — LISINOPRIL 20 MG/1
20 TABLET ORAL DAILY
Qty: 90 TABLET | Refills: 3 | Status: SHIPPED | OUTPATIENT
Start: 2024-11-19

## 2024-11-19 RX ORDER — METFORMIN HYDROCHLORIDE 500 MG/1
1000 TABLET, EXTENDED RELEASE ORAL 2 TIMES DAILY WITH MEALS
Qty: 360 TABLET | Refills: 3 | Status: SHIPPED | OUTPATIENT
Start: 2024-11-19

## 2024-11-19 ASSESSMENT — PAIN SCALES - GENERAL: PAINLEVEL_OUTOF10: NO PAIN (0)

## 2024-11-19 NOTE — RESULT ENCOUNTER NOTE
Ekaterina Rae,    Attached are your test results.  -Normal red blood cell (hgb) levels, normal white blood cell count and normal platelet levels.  -A1C (test of diabetes control the last 2-3 months) is at your goal. Please continue with your current plan. Also, you should make a virtual  appointment to see me and recheck your A1C test in 6 months.    Please contact us if you have any questions.    Melissa Liu, CNP

## 2024-11-19 NOTE — PATIENT INSTRUCTIONS
PLAN:   1.   Symptomatic therapy suggested: will increase ozempic to 1 mg and then 2 mg in the next 2 months.  2.  Orders Placed This Encounter   Medications    Semaglutide, 1 MG/DOSE, (OZEMPIC) 4 MG/3ML pen     Sig: Inject 1 mg subcutaneously every 7 days.     Dispense:  3 mL     Refill:  0    Semaglutide, 2 MG/DOSE, (OZEMPIC) 8 MG/3ML pen     Sig: Inject 2 mg subcutaneously every 7 days.     Dispense:  9 mL     Refill:  3    atorvastatin (LIPITOR) 10 MG tablet     Sig: Take 1 tablet (10 mg) by mouth daily.     Dispense:  90 tablet     Refill:  3    empagliflozin (JARDIANCE) 10 MG TABS tablet     Sig: Take 1 tablet (10 mg) by mouth daily.     Dispense:  90 tablet     Refill:  3    lisinopril (ZESTRIL) 20 MG tablet     Sig: Take 1 tablet (20 mg) by mouth daily.     Dispense:  90 tablet     Refill:  3    metFORMIN (GLUCOPHAGE XR) 500 MG 24 hr tablet     Sig: Take 2 tablets (1,000 mg) by mouth 2 times daily (with meals).     Dispense:  360 tablet     Refill:  3     Orders Placed This Encounter   Procedures    REVIEW OF HEALTH MAINTENANCE PROTOCOL ORDERS    CO FOOT EXAM NO CHARGE    Lipid panel reflex to direct LDL Fasting    Albumin Random Urine Quantitative with Creat Ratio    CBC with platelets    Comprehensive metabolic panel    TSH with free T4 reflex    Prostate Specific Antigen Screen    Hemoglobin A1c       3.  Follow up in 6 months.  Will follow up and/or notify patient of  results via My Chart to determine further need for followup   Follow up office visit in one year for annual health maintenance exam, sooner PRN.   Patient needs to follow up in if no improvement,or sooner if worsening of symptoms or other symptoms develop.         Patient Education   Preventive Care Advice   This is general advice given by our system to help you stay healthy. However, your care team may have specific advice just for you. Please talk to your care team about your preventive care needs.  Nutrition  Eat 5 or more servings of  fruits and vegetables each day.  Try wheat bread, brown rice and whole grain pasta (instead of white bread, rice, and pasta).  Get enough calcium and vitamin D. Check the label on foods and aim for 100% of the RDA (recommended daily allowance).  Lifestyle  Exercise at least 150 minutes each week  (30 minutes a day, 5 days a week).  Do muscle strengthening activities 2 days a week. These help control your weight and prevent disease.  No smoking.  Wear sunscreen to prevent skin cancer.  Have a dental exam and cleaning every 6 months.  Yearly exams  See your health care team every year to talk about:  Any changes in your health.  Any medicines your care team has prescribed.  Preventive care, family planning, and ways to prevent chronic diseases.  Shots (vaccines)   HPV shots (up to age 26), if you've never had them before.  Hepatitis B shots (up to age 59), if you've never had them before.  COVID-19 shot: Get this shot when it's due.  Flu shot: Get a flu shot every year.  Tetanus shot: Get a tetanus shot every 10 years.  Pneumococcal, hepatitis A, and RSV shots: Ask your care team if you need these based on your risk.  Shingles shot (for age 50 and up)  General health tests  Diabetes screening:  Starting at age 35, Get screened for diabetes at least every 3 years.  If you are younger than age 35, ask your care team if you should be screened for diabetes.  Cholesterol test: At age 39, start having a cholesterol test every 5 years, or more often if advised.  Bone density scan (DEXA): At age 50, ask your care team if you should have this scan for osteoporosis (brittle bones).  Hepatitis C: Get tested at least once in your life.  STIs (sexually transmitted infections)  Before age 24: Ask your care team if you should be screened for STIs.  After age 24: Get screened for STIs if you're at risk. You are at risk for STIs (including HIV) if:  You are sexually active with more than one person.  You don't use condoms every  time.  You or a partner was diagnosed with a sexually transmitted infection.  If you are at risk for HIV, ask about PrEP medicine to prevent HIV.  Get tested for HIV at least once in your life, whether you are at risk for HIV or not.  Cancer screening tests  Cervical cancer screening: If you have a cervix, begin getting regular cervical cancer screening tests starting at age 21.  Breast cancer scan (mammogram): If you've ever had breasts, begin having regular mammograms starting at age 40. This is a scan to check for breast cancer.  Colon cancer screening: It is important to start screening for colon cancer at age 45.  Have a colonoscopy test every 10 years (or more often if you're at risk) Or, ask your provider about stool tests like a FIT test every year or Cologuard test every 3 years.  To learn more about your testing options, visit:   .  For help making a decision, visit:   https://bit.ly/qh69648.  Prostate cancer screening test: If you have a prostate, ask your care team if a prostate cancer screening test (PSA) at age 55 is right for you.  Lung cancer screening: If you are a current or former smoker ages 50 to 80, ask your care team if ongoing lung cancer screenings are right for you.  For informational purposes only. Not to replace the advice of your health care provider. Copyright   2023 [employer]. All rights reserved. Clinically reviewed by the [facility]. [employer] 830682 - REV 01/24.
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
## 2024-11-21 NOTE — RESULT ENCOUNTER NOTE
Ekaterina Rae,    Attached are your test results.   -PSA (prostate specific antigen) test is normal.    -Cholesterol levels are at your goal levels.  ADVISE: continuing your medication, a regular exercise program with at least 150 minutes of aerobic exercise per week, and eating a low saturated fat/low carbohydrate diet.  Also, you should recheck this fasting cholesterol panel in 12 months.  -Liver and gallbladder tests (ALT,AST, Alk phos,bilirubin) are significantly elevated. ADVISE: recheck in 3 months   -Kidney function (GFR) is normal.  -Sodium is normal.  -Potassium is normal.  -Calcium is normal.  -Glucose is elevated due to your diabetes.  -TSH (thyroid stimulating hormone) level is normal which indicates normal thyroid function.  -Microalbumin (urine protein) level is elevated. This is suggestive of early damage to your kidneys from high blood pressure and diabetes.  ADVISE: avoiding anti-inflamatory agents such as ibuprofen (Advil, Motrin) or naproxen (Aleve) as much as possible, keeping your blood pressure in a normal range, and continuing your medication (lisinopril) that helps protect your kidneys.  Also, this should be rechecked in 1 year.    Please contact us if you have any questions.    Melissa Liu, CNP

## 2025-05-30 ENCOUNTER — ANCILLARY PROCEDURE (OUTPATIENT)
Dept: GENERAL RADIOLOGY | Facility: CLINIC | Age: 50
End: 2025-05-30
Attending: NURSE PRACTITIONER
Payer: COMMERCIAL

## 2025-05-30 ENCOUNTER — RESULTS FOLLOW-UP (OUTPATIENT)
Dept: FAMILY MEDICINE | Facility: CLINIC | Age: 50
End: 2025-05-30

## 2025-05-30 ENCOUNTER — OFFICE VISIT (OUTPATIENT)
Dept: FAMILY MEDICINE | Facility: CLINIC | Age: 50
End: 2025-05-30
Payer: COMMERCIAL

## 2025-05-30 VITALS
OXYGEN SATURATION: 100 % | BODY MASS INDEX: 37.69 KG/M2 | RESPIRATION RATE: 16 BRPM | DIASTOLIC BLOOD PRESSURE: 82 MMHG | WEIGHT: 240.1 LBS | TEMPERATURE: 97 F | HEIGHT: 67 IN | SYSTOLIC BLOOD PRESSURE: 118 MMHG | HEART RATE: 84 BPM

## 2025-05-30 DIAGNOSIS — M25.511 ACUTE PAIN OF RIGHT SHOULDER: ICD-10-CM

## 2025-05-30 DIAGNOSIS — E11.65 TYPE 2 DIABETES MELLITUS WITH HYPERGLYCEMIA, WITHOUT LONG-TERM CURRENT USE OF INSULIN (H): Primary | ICD-10-CM

## 2025-05-30 LAB
EST. AVERAGE GLUCOSE BLD GHB EST-MCNC: 163 MG/DL
HBA1C MFR BLD: 7.3 % (ref 0–5.6)

## 2025-05-30 PROCEDURE — 1125F AMNT PAIN NOTED PAIN PRSNT: CPT | Performed by: NURSE PRACTITIONER

## 2025-05-30 PROCEDURE — 3051F HG A1C>EQUAL 7.0%<8.0%: CPT | Performed by: NURSE PRACTITIONER

## 2025-05-30 PROCEDURE — 83036 HEMOGLOBIN GLYCOSYLATED A1C: CPT | Performed by: NURSE PRACTITIONER

## 2025-05-30 PROCEDURE — 99214 OFFICE O/P EST MOD 30 MIN: CPT | Performed by: NURSE PRACTITIONER

## 2025-05-30 PROCEDURE — 36415 COLL VENOUS BLD VENIPUNCTURE: CPT | Performed by: NURSE PRACTITIONER

## 2025-05-30 PROCEDURE — 3074F SYST BP LT 130 MM HG: CPT | Performed by: NURSE PRACTITIONER

## 2025-05-30 PROCEDURE — 3079F DIAST BP 80-89 MM HG: CPT | Performed by: NURSE PRACTITIONER

## 2025-05-30 PROCEDURE — 73030 X-RAY EXAM OF SHOULDER: CPT | Mod: TC | Performed by: RADIOLOGY

## 2025-05-30 PROCEDURE — 80048 BASIC METABOLIC PNL TOTAL CA: CPT | Performed by: NURSE PRACTITIONER

## 2025-05-30 RX ORDER — NAPROXEN 500 MG/1
500 TABLET ORAL 2 TIMES DAILY PRN
Qty: 30 TABLET | Refills: 1 | Status: SHIPPED | OUTPATIENT
Start: 2025-05-30

## 2025-05-30 ASSESSMENT — ANXIETY QUESTIONNAIRES
GAD7 TOTAL SCORE: 0
2. NOT BEING ABLE TO STOP OR CONTROL WORRYING: NOT AT ALL
3. WORRYING TOO MUCH ABOUT DIFFERENT THINGS: NOT AT ALL
7. FEELING AFRAID AS IF SOMETHING AWFUL MIGHT HAPPEN: NOT AT ALL
1. FEELING NERVOUS, ANXIOUS, OR ON EDGE: NOT AT ALL
4. TROUBLE RELAXING: NOT AT ALL
GAD7 TOTAL SCORE: 0
6. BECOMING EASILY ANNOYED OR IRRITABLE: NOT AT ALL
IF YOU CHECKED OFF ANY PROBLEMS ON THIS QUESTIONNAIRE, HOW DIFFICULT HAVE THESE PROBLEMS MADE IT FOR YOU TO DO YOUR WORK, TAKE CARE OF THINGS AT HOME, OR GET ALONG WITH OTHER PEOPLE: NOT DIFFICULT AT ALL
5. BEING SO RESTLESS THAT IT IS HARD TO SIT STILL: NOT AT ALL

## 2025-05-30 ASSESSMENT — PAIN SCALES - GENERAL: PAINLEVEL_OUTOF10: MILD PAIN (3)

## 2025-05-30 ASSESSMENT — PATIENT HEALTH QUESTIONNAIRE - PHQ9: SUM OF ALL RESPONSES TO PHQ QUESTIONS 1-9: 0

## 2025-05-30 NOTE — PATIENT INSTRUCTIONS
PLAN:   1.   Symptomatic therapy suggested:   prescription for NSAID given    2.  Orders Placed This Encounter   Medications    naproxen (NAPROSYN) 500 MG tablet     Sig: Take 1 tablet (500 mg) by mouth 2 times daily as needed.     Dispense:  30 tablet     Refill:  1     Orders Placed This Encounter   Procedures    REVIEW OF HEALTH MAINTENANCE PROTOCOL ORDERS    XR Shoulder Right G/E 3 Views    HEMOGLOBIN A1C    Basic metabolic panel       3.  FURTHER TESTING:       - xray   Work on weight loss  Regular exercise  Will follow up and/or notify patient of  results via My Chart to determine further need for followup    Patient needs to follow up in if no improvement,or sooner if worsening of symptoms or other symptoms develop.         05-Apr-2022 20:00
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
## 2025-05-31 LAB
ANION GAP SERPL CALCULATED.3IONS-SCNC: 13 MMOL/L (ref 7–15)
BUN SERPL-MCNC: 14.1 MG/DL (ref 6–20)
CALCIUM SERPL-MCNC: 9.4 MG/DL (ref 8.8–10.4)
CHLORIDE SERPL-SCNC: 105 MMOL/L (ref 98–107)
CREAT SERPL-MCNC: 0.71 MG/DL (ref 0.67–1.17)
EGFRCR SERPLBLD CKD-EPI 2021: >90 ML/MIN/1.73M2
GLUCOSE SERPL-MCNC: 145 MG/DL (ref 70–99)
HCO3 SERPL-SCNC: 22 MMOL/L (ref 22–29)
POTASSIUM SERPL-SCNC: 4.5 MMOL/L (ref 3.4–5.3)
SODIUM SERPL-SCNC: 140 MMOL/L (ref 135–145)

## 2025-06-01 NOTE — RESULT ENCOUNTER NOTE
Ekaterina Rae,    Attached are your test results.  -Kidney function (GFR) is normal.  -Sodium is normal.  -Potassium is normal.  -Calcium is normal.  -Glucose is elevated due to your diabetes.  -The goal for diabetics without other complications is less than 7. You should recheck your A1c in 3 months. In the meantime, a healthy diet high in lean protein, whole grain carbohydrates and healthy fats such as olive oil or canola will help mainain a healthy blood sugar    A1c(%) Mean blood sugar (mg/dl)  6 135  7 170  8 205  9 240  10 275  11 310  12 345    Next Hemoglobin A1c in  3 months   Please contact us if you have any questions.    Melissa Liu, CNP

## 2025-08-08 ENCOUNTER — TELEPHONE (OUTPATIENT)
Dept: FAMILY MEDICINE | Facility: CLINIC | Age: 50
End: 2025-08-08
Payer: COMMERCIAL

## 2025-08-12 DIAGNOSIS — E11.65 TYPE 2 DIABETES MELLITUS WITH HYPERGLYCEMIA, WITHOUT LONG-TERM CURRENT USE OF INSULIN (H): ICD-10-CM

## 2025-08-14 RX ORDER — LIRAGLUTIDE 6 MG/ML
INJECTION SUBCUTANEOUS
Qty: 27 ML | Refills: 0 | OUTPATIENT
Start: 2025-08-14

## (undated) DEVICE — WIPE PREMOIST CLEANSING WASHCLOTHS 7988

## (undated) DEVICE — KIT ENDO FIRST STEP DISINFECTANT 200ML W/POUCH EP-4

## (undated) DEVICE — PREP CHLORAPREP 26ML TINTED ORANGE  260815

## (undated) DEVICE — PAD CHUX UNDERPAD 23X24" 7136

## (undated) DEVICE — GOWN XLG DISP 9545

## (undated) RX ORDER — GABAPENTIN 300 MG/1
CAPSULE ORAL
Status: DISPENSED
Start: 2017-06-20

## (undated) RX ORDER — DEXAMETHASONE SODIUM PHOSPHATE 4 MG/ML
INJECTION, SOLUTION INTRA-ARTICULAR; INTRALESIONAL; INTRAMUSCULAR; INTRAVENOUS; SOFT TISSUE
Status: DISPENSED
Start: 2017-06-20

## (undated) RX ORDER — PROPOFOL 10 MG/ML
INJECTION, EMULSION INTRAVENOUS
Status: DISPENSED
Start: 2023-04-11

## (undated) RX ORDER — EPINEPHRINE 1 MG/ML
INJECTION, SOLUTION INTRAMUSCULAR; SUBCUTANEOUS
Status: DISPENSED
Start: 2023-04-11

## (undated) RX ORDER — ACETAMINOPHEN 325 MG/1
TABLET ORAL
Status: DISPENSED
Start: 2017-06-20

## (undated) RX ORDER — FENTANYL CITRATE 50 UG/ML
INJECTION, SOLUTION INTRAMUSCULAR; INTRAVENOUS
Status: DISPENSED
Start: 2023-02-06

## (undated) RX ORDER — ONDANSETRON 2 MG/ML
INJECTION INTRAMUSCULAR; INTRAVENOUS
Status: DISPENSED
Start: 2017-06-20

## (undated) RX ORDER — PROPOFOL 10 MG/ML
INJECTION, EMULSION INTRAVENOUS
Status: DISPENSED
Start: 2017-06-20